# Patient Record
Sex: MALE | Race: WHITE | URBAN - METROPOLITAN AREA
[De-identification: names, ages, dates, MRNs, and addresses within clinical notes are randomized per-mention and may not be internally consistent; named-entity substitution may affect disease eponyms.]

---

## 2017-03-01 ENCOUNTER — TRANSFERRED RECORDS (OUTPATIENT)
Dept: HEALTH INFORMATION MANAGEMENT | Facility: CLINIC | Age: 82
End: 2017-03-01

## 2017-03-06 ENCOUNTER — TRANSFERRED RECORDS (OUTPATIENT)
Dept: HEALTH INFORMATION MANAGEMENT | Facility: CLINIC | Age: 82
End: 2017-03-06

## 2017-03-07 ENCOUNTER — HOSPITAL ENCOUNTER (INPATIENT)
Facility: CLINIC | Age: 82
LOS: 12 days | Discharge: NURSING, REHAB OR LONG-TERM CARE | DRG: 813 | End: 2017-03-19
Attending: INTERNAL MEDICINE | Admitting: HOSPITALIST
Payer: COMMERCIAL

## 2017-03-07 ENCOUNTER — TRANSFERRED RECORDS (OUTPATIENT)
Dept: HEALTH INFORMATION MANAGEMENT | Facility: CLINIC | Age: 82
End: 2017-03-07

## 2017-03-07 ENCOUNTER — APPOINTMENT (OUTPATIENT)
Dept: CT IMAGING | Facility: CLINIC | Age: 82
DRG: 813 | End: 2017-03-07
Attending: PHYSICIAN ASSISTANT
Payer: COMMERCIAL

## 2017-03-07 DIAGNOSIS — I48.91 ATRIAL FIBRILLATION, UNSPECIFIED TYPE (H): ICD-10-CM

## 2017-03-07 DIAGNOSIS — R05.9 COUGH: ICD-10-CM

## 2017-03-07 DIAGNOSIS — R58 INTRAABDOMINAL HEMORRHAGE: Primary | ICD-10-CM

## 2017-03-07 DIAGNOSIS — F32.A DEPRESSION, UNSPECIFIED DEPRESSION TYPE: ICD-10-CM

## 2017-03-07 DIAGNOSIS — K21.9 GASTROESOPHAGEAL REFLUX DISEASE WITHOUT ESOPHAGITIS: ICD-10-CM

## 2017-03-07 LAB
ABO + RH BLD: NORMAL
ABO + RH BLD: NORMAL
ANION GAP SERPL CALCULATED.3IONS-SCNC: 9 MMOL/L (ref 3–14)
BLD GP AB SCN SERPL QL: NORMAL
BLD PROD TYP BPU: NORMAL
BLD PROD TYP BPU: NORMAL
BLD UNIT ID BPU: 0
BLOOD BANK CMNT PATIENT-IMP: NORMAL
BLOOD PRODUCT CODE: NORMAL
BPU ID: NORMAL
BUN SERPL-MCNC: 27 MG/DL (ref 7–30)
CALCIUM SERPL-MCNC: 8.2 MG/DL (ref 8.5–10.1)
CHLORIDE SERPL-SCNC: 102 MMOL/L (ref 94–109)
CO2 SERPL-SCNC: 26 MMOL/L (ref 20–32)
CREAT SERPL-MCNC: 1.2 MG/DL (ref 0.66–1.25)
ERYTHROCYTE [DISTWIDTH] IN BLOOD BY AUTOMATED COUNT: 14.8 % (ref 10–15)
GFR SERPL CREATININE-BSD FRML MDRD: 57 ML/MIN/1.7M2
GLUCOSE BLDC GLUCOMTR-MCNC: 108 MG/DL (ref 70–99)
GLUCOSE BLDC GLUCOMTR-MCNC: 117 MG/DL (ref 70–99)
GLUCOSE BLDC GLUCOMTR-MCNC: 118 MG/DL (ref 70–99)
GLUCOSE SERPL-MCNC: 99 MG/DL (ref 70–99)
HCT VFR BLD AUTO: 38.7 % (ref 40–53)
HGB BLD-MCNC: 12 G/DL (ref 13.3–17.7)
HGB BLD-MCNC: 12.5 G/DL (ref 13.3–17.7)
HGB BLD-MCNC: 12.7 G/DL (ref 13.3–17.7)
HGB BLD-MCNC: 13.4 G/DL (ref 13.3–17.7)
INR PPP: 1.22 (ref 0.86–1.14)
INR PPP: 1.26 (ref 0.86–1.14)
INR PPP: 2.48 (ref 0.86–1.14)
LACTATE BLD-SCNC: 2.6 MMOL/L (ref 0.7–2.1)
LACTATE BLD-SCNC: 3.4 MMOL/L (ref 0.7–2.1)
MCH RBC QN AUTO: 34.7 PG (ref 26.5–33)
MCHC RBC AUTO-ENTMCNC: 34.6 G/DL (ref 31.5–36.5)
MCV RBC AUTO: 100 FL (ref 78–100)
NUM BPU REQUESTED: 1
PLATELET # BLD AUTO: 146 10E9/L (ref 150–450)
POTASSIUM SERPL-SCNC: 3.5 MMOL/L (ref 3.4–5.3)
RBC # BLD AUTO: 3.86 10E12/L (ref 4.4–5.9)
SODIUM SERPL-SCNC: 137 MMOL/L (ref 133–144)
SPECIMEN EXP DATE BLD: NORMAL
TRANSFUSION STATUS PATIENT QL: NORMAL
TRANSFUSION STATUS PATIENT QL: NORMAL
WBC # BLD AUTO: 5.5 10E9/L (ref 4–11)

## 2017-03-07 PROCEDURE — 25000128 H RX IP 250 OP 636: Performed by: PHYSICIAN ASSISTANT

## 2017-03-07 PROCEDURE — 74176 CT ABD & PELVIS W/O CONTRAST: CPT

## 2017-03-07 PROCEDURE — 25000125 ZZHC RX 250: Performed by: PHYSICIAN ASSISTANT

## 2017-03-07 PROCEDURE — 85018 HEMOGLOBIN: CPT | Performed by: HOSPITALIST

## 2017-03-07 PROCEDURE — 80048 BASIC METABOLIC PNL TOTAL CA: CPT | Performed by: PHYSICIAN ASSISTANT

## 2017-03-07 PROCEDURE — 25000125 ZZHC RX 250

## 2017-03-07 PROCEDURE — 99223 1ST HOSP IP/OBS HIGH 75: CPT | Mod: AI | Performed by: HOSPITALIST

## 2017-03-07 PROCEDURE — 85610 PROTHROMBIN TIME: CPT | Performed by: HOSPITALIST

## 2017-03-07 PROCEDURE — 36415 COLL VENOUS BLD VENIPUNCTURE: CPT | Performed by: HOSPITALIST

## 2017-03-07 PROCEDURE — 85027 COMPLETE CBC AUTOMATED: CPT | Performed by: PHYSICIAN ASSISTANT

## 2017-03-07 PROCEDURE — 36415 COLL VENOUS BLD VENIPUNCTURE: CPT | Performed by: PHYSICIAN ASSISTANT

## 2017-03-07 PROCEDURE — 25000555 ZZHC RX FACTOR IP 250 OP 636: Performed by: PHYSICIAN ASSISTANT

## 2017-03-07 PROCEDURE — 85610 PROTHROMBIN TIME: CPT | Performed by: PHYSICIAN ASSISTANT

## 2017-03-07 PROCEDURE — 86901 BLOOD TYPING SEROLOGIC RH(D): CPT | Performed by: PHYSICIAN ASSISTANT

## 2017-03-07 PROCEDURE — 83605 ASSAY OF LACTIC ACID: CPT | Performed by: PHYSICIAN ASSISTANT

## 2017-03-07 PROCEDURE — 85018 HEMOGLOBIN: CPT | Performed by: PHYSICIAN ASSISTANT

## 2017-03-07 PROCEDURE — 86900 BLOOD TYPING SEROLOGIC ABO: CPT | Performed by: PHYSICIAN ASSISTANT

## 2017-03-07 PROCEDURE — 00000146 ZZHCL STATISTIC GLUCOSE BY METER IP

## 2017-03-07 PROCEDURE — C9132 KCENTRA, PER I.U.: HCPCS | Performed by: PHYSICIAN ASSISTANT

## 2017-03-07 PROCEDURE — P9059 PLASMA, FRZ BETWEEN 8-24HOUR: HCPCS | Performed by: PHYSICIAN ASSISTANT

## 2017-03-07 PROCEDURE — 99207 ZZC APP CREDIT; MD BILLING SHARED VISIT: CPT | Performed by: PHYSICIAN ASSISTANT

## 2017-03-07 PROCEDURE — 20000003 ZZH R&B ICU

## 2017-03-07 PROCEDURE — 40000344 ZZHCL STATISTIC THAWING COMPONENT: Performed by: PHYSICIAN ASSISTANT

## 2017-03-07 PROCEDURE — 86850 RBC ANTIBODY SCREEN: CPT | Performed by: PHYSICIAN ASSISTANT

## 2017-03-07 PROCEDURE — 99221 1ST HOSP IP/OBS SF/LOW 40: CPT | Performed by: SURGERY

## 2017-03-07 PROCEDURE — 83605 ASSAY OF LACTIC ACID: CPT | Performed by: HOSPITALIST

## 2017-03-07 RX ORDER — ONDANSETRON 2 MG/ML
4 INJECTION INTRAMUSCULAR; INTRAVENOUS EVERY 6 HOURS PRN
Status: DISCONTINUED | OUTPATIENT
Start: 2017-03-07 | End: 2017-03-19 | Stop reason: HOSPADM

## 2017-03-07 RX ORDER — MINERAL OIL/HYDROPHIL PETROLAT
OINTMENT (GRAM) TOPICAL 2 TIMES DAILY PRN
COMMUNITY

## 2017-03-07 RX ORDER — HYDROMORPHONE HYDROCHLORIDE 1 MG/ML
.3-.5 INJECTION, SOLUTION INTRAMUSCULAR; INTRAVENOUS; SUBCUTANEOUS
Status: DISCONTINUED | OUTPATIENT
Start: 2017-03-07 | End: 2017-03-07

## 2017-03-07 RX ORDER — SENNOSIDES 8.6 MG
1 TABLET ORAL AT BEDTIME
COMMUNITY

## 2017-03-07 RX ORDER — DEXTROSE MONOHYDRATE 25 G/50ML
25-50 INJECTION, SOLUTION INTRAVENOUS
Status: DISCONTINUED | OUTPATIENT
Start: 2017-03-07 | End: 2017-03-19 | Stop reason: HOSPADM

## 2017-03-07 RX ORDER — LEVOTHYROXINE SODIUM 100 UG/1
200 TABLET ORAL DAILY
Status: DISCONTINUED | OUTPATIENT
Start: 2017-03-08 | End: 2017-03-19 | Stop reason: HOSPADM

## 2017-03-07 RX ORDER — PHYTONADIONE 1 MG/.5ML
2 INJECTION, EMULSION INTRAMUSCULAR; INTRAVENOUS; SUBCUTANEOUS ONCE
Status: DISCONTINUED | OUTPATIENT
Start: 2017-03-07 | End: 2017-03-07

## 2017-03-07 RX ORDER — PROCHLORPERAZINE 25 MG
25 SUPPOSITORY, RECTAL RECTAL EVERY 12 HOURS PRN
COMMUNITY

## 2017-03-07 RX ORDER — NITROGLYCERIN 0.4 MG/1
0.4 TABLET SUBLINGUAL EVERY 5 MIN PRN
Status: DISCONTINUED | OUTPATIENT
Start: 2017-03-07 | End: 2017-03-19 | Stop reason: HOSPADM

## 2017-03-07 RX ORDER — PROCHLORPERAZINE 25 MG
12.5 SUPPOSITORY, RECTAL RECTAL EVERY 12 HOURS PRN
Status: DISCONTINUED | OUTPATIENT
Start: 2017-03-07 | End: 2017-03-19 | Stop reason: HOSPADM

## 2017-03-07 RX ORDER — OSELTAMIVIR PHOSPHATE 30 MG/1
30 CAPSULE ORAL DAILY
Status: DISCONTINUED | OUTPATIENT
Start: 2017-03-07 | End: 2017-03-08

## 2017-03-07 RX ORDER — ACETAMINOPHEN 650 MG/1
650 SUPPOSITORY RECTAL EVERY 4 HOURS PRN
Status: DISCONTINUED | OUTPATIENT
Start: 2017-03-07 | End: 2017-03-19 | Stop reason: HOSPADM

## 2017-03-07 RX ORDER — HYDROMORPHONE HYDROCHLORIDE 1 MG/ML
0.3 INJECTION, SOLUTION INTRAMUSCULAR; INTRAVENOUS; SUBCUTANEOUS
Status: DISCONTINUED | OUTPATIENT
Start: 2017-03-07 | End: 2017-03-19 | Stop reason: HOSPADM

## 2017-03-07 RX ORDER — NALOXONE HYDROCHLORIDE 0.4 MG/ML
.1-.4 INJECTION, SOLUTION INTRAMUSCULAR; INTRAVENOUS; SUBCUTANEOUS
Status: DISCONTINUED | OUTPATIENT
Start: 2017-03-07 | End: 2017-03-19 | Stop reason: HOSPADM

## 2017-03-07 RX ORDER — LIDOCAINE 40 MG/G
CREAM TOPICAL
Status: DISCONTINUED | OUTPATIENT
Start: 2017-03-07 | End: 2017-03-19 | Stop reason: HOSPADM

## 2017-03-07 RX ORDER — ACETAMINOPHEN 325 MG/1
650 TABLET ORAL EVERY 4 HOURS PRN
Status: DISCONTINUED | OUTPATIENT
Start: 2017-03-07 | End: 2017-03-19 | Stop reason: HOSPADM

## 2017-03-07 RX ORDER — PROCHLORPERAZINE MALEATE 5 MG
5 TABLET ORAL EVERY 6 HOURS PRN
Status: DISCONTINUED | OUTPATIENT
Start: 2017-03-07 | End: 2017-03-19 | Stop reason: HOSPADM

## 2017-03-07 RX ORDER — SENNOSIDES 8.6 MG
1 TABLET ORAL AT BEDTIME
Status: DISCONTINUED | OUTPATIENT
Start: 2017-03-07 | End: 2017-03-19 | Stop reason: HOSPADM

## 2017-03-07 RX ORDER — ONDANSETRON 4 MG/1
4 TABLET, ORALLY DISINTEGRATING ORAL EVERY 6 HOURS PRN
Status: DISCONTINUED | OUTPATIENT
Start: 2017-03-07 | End: 2017-03-19 | Stop reason: HOSPADM

## 2017-03-07 RX ORDER — POLYETHYLENE GLYCOL 3350 17 G/17G
1 POWDER, FOR SOLUTION ORAL DAILY PRN
COMMUNITY

## 2017-03-07 RX ORDER — NICOTINE POLACRILEX 4 MG
15-30 LOZENGE BUCCAL
Status: DISCONTINUED | OUTPATIENT
Start: 2017-03-07 | End: 2017-03-19 | Stop reason: HOSPADM

## 2017-03-07 RX ORDER — ACETAMINOPHEN 650 MG/1
SUPPOSITORY RECTAL
Status: DISCONTINUED
Start: 2017-03-07 | End: 2017-03-07 | Stop reason: WASHOUT

## 2017-03-07 RX ORDER — SODIUM CHLORIDE 9 MG/ML
INJECTION, SOLUTION INTRAVENOUS CONTINUOUS
Status: DISCONTINUED | OUTPATIENT
Start: 2017-03-07 | End: 2017-03-08

## 2017-03-07 RX ADMIN — HYDROMORPHONE HYDROCHLORIDE 0.3 MG: 1 INJECTION, SOLUTION INTRAMUSCULAR; INTRAVENOUS; SUBCUTANEOUS at 14:21

## 2017-03-07 RX ADMIN — LIDOCAINE HYDROCHLORIDE 10 ML: 20 JELLY TOPICAL at 14:14

## 2017-03-07 RX ADMIN — METOPROLOL TARTRATE 2.5 MG: 5 INJECTION INTRAVENOUS at 19:32

## 2017-03-07 RX ADMIN — PROTHROMBIN, COAGULATION FACTOR VII HUMAN, COAGULATION FACTOR IX HUMAN, COAGULATION FACTOR X HUMAN, PROTEIN C, PROTEIN S HUMAN, AND WATER 2794 UNITS: KIT at 14:36

## 2017-03-07 RX ADMIN — PHYTONADIONE 10 MG: 10 INJECTION, EMULSION INTRAMUSCULAR; INTRAVENOUS; SUBCUTANEOUS at 13:43

## 2017-03-07 RX ADMIN — METOPROLOL TARTRATE 2.5 MG: 5 INJECTION INTRAVENOUS at 16:04

## 2017-03-07 RX ADMIN — SODIUM CHLORIDE 1000 ML: 9 INJECTION, SOLUTION INTRAVENOUS at 14:24

## 2017-03-07 RX ADMIN — SODIUM CHLORIDE, PRESERVATIVE FREE: 5 INJECTION INTRAVENOUS at 16:41

## 2017-03-07 RX ADMIN — SODIUM CHLORIDE 500 ML: 9 INJECTION, SOLUTION INTRAVENOUS at 13:32

## 2017-03-07 RX ADMIN — SODIUM CHLORIDE, PRESERVATIVE FREE: 5 INJECTION INTRAVENOUS at 10:07

## 2017-03-07 ASSESSMENT — PAIN DESCRIPTION - DESCRIPTORS: DESCRIPTORS: SHARP

## 2017-03-07 NOTE — H&P
PRIMARY CARE PROVIDER:  [facility]       History obtained  from the patient and extensive review of medical records from outside hospital.      HISTORY OF PRESENT ILLNESS:  Mason Gonzalez is a 89-year-old male with past medical history of hypothyroidism and diet-controlled type 2 diabetes, obstructive sleep apnea and CHF as well as atrial fibrillation on chronic anticoagulation with warfarin, who presented initially to the [facility] for evaluation of abdominal pain.  He reports 4-5 days of progressive abdominal pain with diminished oral intake.  He was evaluated at outside medical center.  Laboratory evaluation was relatively unremarkable with the exception of the INR 5.8.  A CT of abdomen and pelvis was obtained which showed an 8.4 cm extraperitoneal hematoma around the bladder.  He was given IV fluids and his hemoglobin was stable at 13.6.  His INR was reversed first with vitamin K.  There were no beds at the [facility] so transfer to [facility] was requested.      Presently the patient is evaluated in his hospital room.  He did provide a limited history.  He continues to complain of abdominal pain.  No current nausea or vomiting but does note diminished oral intake recently.  Denies fevers or chills.  Last bowel movement was 5 days ago.  Denies any difficulty urinating.  Denies recent trauma or fall.  No recent instrumentation of the bladder.      PAST MEDICAL HISTORY:   1.  Hypothyroidism.   2.  Diet-controlled type 2 diabetes.   3.  BPH.   4.  Dyslipidemia.   5.  Obstructive sleep apnea, does not use CPAP.   6.  Depression.   7.  GERD.   8.  History of colon cancer, status post right colectomy.   9.  Hypertension.   10.  Spinal stenosis.   11.  Atrial fibrillation on chronic anticoagulation.   12.  History of iliac artery aneurysm.   13.  History of congestive heart failure, unspecified.        PRIOR TO ADMISSION MEDICATIONS:   Prescriptions Prior to  Admission   Medication Sig Dispense Refill Last Dose     Potassium Chloride Mayra CR (K-DUR PO) Take 20 mEq by mouth daily        polyethylene glycol (MIRALAX/GLYCOLAX) Packet Take 1 packet by mouth daily as needed for constipation Give every 4 days if no stool        METOPROLOL TARTRATE PO Take 50 mg by mouth 2 times daily        MECLIZINE HCL PO Take 25 mg by mouth every 6 hours as needed for dizziness        MAGNESIUM OXIDE PO Take 400 mg by mouth At Bedtime        Levothyroxine Sodium (SYNTHROID PO) Take 200 mcg by mouth daily        Furosemide (LASIX PO) Take 40 mg by mouth daily        VITAMIN D, CHOLECALCIFEROL, PO Take 4,000 Units by mouth daily        AMOXICILLIN PO Take 2,000 mg by mouth once One hour before dental cleaning        Acetaminophen (TYLENOL PO) Take 650 mg by mouth 4 times daily        prochlorperazine (COMPAZINE) 25 MG Suppository Place 25 mg rectally every 12 hours as needed for nausea        TraMADol HCl (ULTRAM PO) Take 25 mg by mouth every 6 hours as needed for moderate to severe pain        mineral oil-hydrophilic petrolatum (AQUAPHOR) Apply topically 2 times daily as needed for dry skin        Hypromellose (HYDROXYPROPYL METHYLCELLULOSE OP) Place into both eyes 3 times daily        FUROSEMIDE PO Take 20 mg by mouth daily (with lunch) At 1400        WARFARIN SODIUM PO Take by mouth See Admin Instructions 6 mg Tuesday, Thursday. 5 mg the rest of the week.   3/6/2017 at Unknown time     sennosides (SENOKOT) 8.6 MG tablet Take 1 tablet by mouth At Bedtime        Oseltamivir Phosphate (TAMIFLU PO) Take 30 mg by mouth daily For ten days, starting 3/6/17             PAST SURGICAL HISTORY:    1.  Bilateral shoulder arthroplasty.   2.  Cholecystectomy.     3.  Right hemicolectomy.   4.  Hip repair.      FAMILY HISTORY:  Reviewed and noncontributory.      SOCIAL HISTORY:  He currently resides in a long-term care facility.  He denies current tobacco or alcohol use.      REVIEW OF SYSTEMS:  A  10-point review of systems was completed.  Pertinent positives are noted in HPI, other systems negative.      PHYSICAL EXAMINATION:   GENERAL:  Patient is a well-developed, well-nourished 89-year-old frail feeling male who appears his stated age and is lying in bed in no acute distress.   VITAL SIGNS:  Blood pressure is 100/68, pulse 97, temperature 98.6, O2 sat 98% on room air.   HEENT:  Normocephalic, atraumatic.  Eyes:  Pupils equal, round, reactive to light, lids and sclerae are clear, extraocular movements intact.  Oropharynx is midline.  Pharynx is clear.  Mucous membranes are dry.   NECK:  Supple, no adenopathy, no thyromegaly.   CARDIOVASCULAR:  Irregularly irregular, rate controlled.  No murmurs appreciated.   PULMONARY:  Normal effort, crackles in bilateral bases.   ABDOMEN:  Slightly distended, diminished bowel sounds.  Abdomen is firm to palpation with right lower quadrant tenderness with mild guarding.   EXTREMITIES:  Moves all 4 extremities.  Dorsalis pedis and radial pulses are palpated bilaterally, lower extremities without edema.   NEUROLOGIC:  He is alert and oriented.  Cranial nerves II-XII are grossly intact.      LABORATORY DATA:  Reviewed in Epic.      ASSESSMENT:  Patient is a 89-year-old male with past medical history of atrial fibrillation on chronic anticoagulation, hypothyroidism and diet-controlled type 2 diabetes who presented from outside hospital for evaluation of an extraperitoneal bladder hemorrhage.   1.  Acute abdominal pain, likely secondary to extraperitoneal bladder hemorrhage.  The patient had a CT scan at outlying facility which showed an 8.4 cm extraperitoneal hemorrhage in the right pelvis surrounding the bladder.  His hemoglobin was stable at 13.6; however, INR was elevated at 5.8.  His INR was reversed with vitamin K and he was transferred to [facility].  He currently remains hemodynamically stable.  Repeat laboratory evaluation revealed stable hemoglobin  as well improved INR of 2.48.  He has been typed and screened.  I have discussed with Radiology and will plan to repeat a noncontrast abdominal CT to reassess bleeding.  If progressing, will discuss with Interventional Radiology to see if coiling would be appropriate.  If appears stable, will consult General Surgery to assist and follow along and medically manage.  For now he will be n.p.o. pending workup.  Pain control will be provided with IV Dilaudid.  Will follow serial hemoglobins.   2.  Atrial fibrillation on chronic anticoagulation.  His INR was reversed with vitamin K at outlying facility.  I could not see in records how much vitamin K he was given; however, his INR has trended down from 5.8 to 2.48.  He did not receive FFP.  Warfarin is on hold.  We will also hold prior to admission metoprolol for now given soft BPs.  Monitor on continuous telemetry.  Resume as indicated.   3.  Hypothyroidism.  Continue prior to admission Synthroid.   4.  Congestive heart failure, unspecified.  The patient has [facility]  I have no echocardiograms available.  Currently appears euvolemic.  Will hold his prior to admission Lasix.  He will be hydrated with IV fluids.  Monitor I's and O's and daily weights.   5.  Obstructive sleep apnea, does not use CPAP.   6.  Influenza prophylaxis.  The patient was initiated on Tamiflu prophylaxis at [facility] for 10 days.  We will continue.   7.  Deep venous thrombosis prophylaxis:  PCDs.      CODE STATUS:  The patient has a POLST in his chart which states he is full code.  Discussed with the patient and he confirms full code.      The patient was discussed with Dr. Voss of the Hospitalist Service who independently interviewed the patient.  He is in agreement with the above plan.         MACKENZIE VOSS MD       As dictated by TERESA LAW PA-C            D: 03/07/2017 11:03   T: 03/07/2017 11:26   MT: [id number]       Name:     HYUN COTA   MRN:      [id number]        Account:      [id number]   :      1926           Admitted:     [id number]      Document: [id number]       cc: [facility]

## 2017-03-07 NOTE — CONSULTS
"Surgery Consultation      Mason Gonzalez MRN# [id number]   YOB: 1926 Age: 89 year old   Date of Admission: 3/7/2017     Reason for consult: I was asked by Dr. Voss to evaluate this patient for extraperitoneal spontaneous hematoma.           Assessment and Plan:   Active Problems:    Extraperitoneal hemorrhage    Assessment: clinically stable, Hgb stable at this point, having INR reverse    Plan: observation, based on clinical response, might require exploration for evacuation or control of bleeding, but not likely at this time.              Chief Complaint:   Lower abdominal pain and extraperitoneal hematoma anterior and right of the urinary bladder.    History is obtained from the patient and electronic health record         History of Present Illness:   This patient is a 89 year old male who presents with lower abdominal pain pain, supratherapeutic INR and spontaneous extraperitoneal bleed.              Past Medical History:   No past medical history on file.          Past Surgical History:   No past surgical history on file.            Social History:   Reviewed and non-contributory          Family History:   No family history on file.          Immunizations:   There is no immunization history for the selected administration types on file for this patient.          Allergies:     Allergies   Allergen Reactions     Codeine Other (See Comments)     Since 2007. Gets massive migraine headaches \"like his head is going to explode\"     Lisinopril      Since 2008             Medications:     Prescriptions Prior to Admission   Medication Sig Dispense Refill Last Dose     Potassium Chloride Mayra CR (K-DUR PO) Take 20 mEq by mouth daily        polyethylene glycol (MIRALAX/GLYCOLAX) Packet Take 1 packet by mouth daily as needed for constipation Give every 4 days if no stool        METOPROLOL TARTRATE PO Take 50 mg by mouth 2 times daily        MECLIZINE HCL PO Take 25 mg by mouth every 6 hours as needed " for dizziness        MAGNESIUM OXIDE PO Take 400 mg by mouth At Bedtime        Levothyroxine Sodium (SYNTHROID PO) Take 200 mcg by mouth daily        Furosemide (LASIX PO) Take 40 mg by mouth daily        VITAMIN D, CHOLECALCIFEROL, PO Take 4,000 Units by mouth daily        AMOXICILLIN PO Take 2,000 mg by mouth once One hour before dental cleaning        Acetaminophen (TYLENOL PO) Take 650 mg by mouth 4 times daily        prochlorperazine (COMPAZINE) 25 MG Suppository Place 25 mg rectally every 12 hours as needed for nausea        TraMADol HCl (ULTRAM PO) Take 25 mg by mouth every 6 hours as needed for moderate to severe pain        mineral oil-hydrophilic petrolatum (AQUAPHOR) Apply topically 2 times daily as needed for dry skin        Hypromellose (HYDROXYPROPYL METHYLCELLULOSE OP) Place into both eyes 3 times daily        FUROSEMIDE PO Take 20 mg by mouth daily (with lunch) At 1400        WARFARIN SODIUM PO Take by mouth See Admin Instructions 6 mg Tuesday, Thursday. 5 mg the rest of the week.   3/6/2017 at Unknown time     sennosides (SENOKOT) 8.6 MG tablet Take 1 tablet by mouth At Bedtime        Oseltamivir Phosphate (TAMIFLU PO) Take 30 mg by mouth daily For ten days, starting 3/6/17                Review of Systems:   The 5 point Review of Systems is negative other than noted in the HPI            Physical Exam:   Vitals were reviewed  Constitutional:   awake, alert, cooperative, no apparent distress, and appears stated age     Eyes:   sclera clear     ENT:   atramatic     Lungs:   no increased work of breathing, good air exchange and no retractions. O2 mask on.     Abdomen:   no scars, firm and tenderness noted in the right lower quadrant     Skin:   no lesions          Data:   All laboratory data reviewed  All imaging studies reviewed by me.

## 2017-03-07 NOTE — PLAN OF CARE
"Problem: Goal Outcome Summary  Goal: Goal Outcome Summary  Outcome: Declining  7a-3p shift:  [facility] - report given to writer by ED nurse Jarad ([phone]) who stated pt presented there with low abdominal pain. High INR (5.6) and CT scan done there at approx 0230 showing retro peritoneal hemorrhaging - Both Dilaudid and Vit K given there x1.   Has HX Afib - was on Coumadin. Writer anticipates that as probable cause of his internal bleeding  Admitted into  831-1 by a resource nurse. Pt alert with dry sense of humor, A&O, [place], facial color dusky, extremities bruised. Abdomen firm distended and very tender to touch.Tele started showing A-fib with UVR HR ranging from 120-140's  CT of abd/pelvis was repeated here which showed increased bleeding. BP  started dropping as low as 81/50 with  - Writer paged Hospitalist and his PA Dinah Duong who came to bedside. Orders received. Was found to have an old periph IV site still [facility]. Flying squad/resources werecalled to assist with cares. Two new IV sites were started, Coude wu catheter was placed. Since pt was mouth breathing with his tachycardia wiriter increased qxygen from 2LPM on NC to 4LPM on Oxymizer   Vit K given x1, Prothrombin 4 complex/KCENTRA given x1, A bolus of 1000cc started at 500cc/hr.  Daughter Luann called for update and told writer that pt is very sensitive to narcotics which have caused hallucinations, confusion and agitation in the past . Writer informed MD\"s of this verbally and sticky note was placed in professional exchange for all MD's to see.  Patient resides at [facility] ([phone])  located in [place], [place]. Writer called the facility asking why pt was on Tamilflu. Their nurse reported that they have a flu outbreak there and all pt's even those without any symptoms or dx of flu were placed on Tamiflu for preacautionary " measures.  Plan: Writer gave report to ICU nurse Arpit MADDEN And updated pt's son who was at bedside. Pt will be transferred to ICU bed rm 365 at where FFP will be initiated and any remaining meds not yet given.

## 2017-03-07 NOTE — PROGRESS NOTES
[facility]    Sepsis Evaluation Progress Note    Date of Service: 03/07/2017    I was called to see Mason Gonzalez due to abnormal vital signs triggering the Sepsis SIRS screening alert. He is not known to have an infection.     Physical Exam    Vital Signs:  Temp: 96.8  F (36  C) Temp src: Oral BP: (!) 81/50 Pulse: 131   Resp: 16 SpO2: 95 % O2 Device: Nasal cannula Oxygen Delivery: 2 LPM    Lab:  Lactic Acid   Date Value Ref Range Status   03/07/2017 3.4 (H) 0.7 - 2.1 mmol/L Final       The patient is at baseline mental status.    The rest of their physical exam is significant for : abdominal wall tenderness in RLQ    Assessment and Plan    The SIRS and exam findings are likely due to a fib with RVR, abdominal wall hematoma, there is no sign of sepsis at this time.    Disposition: The patient will be transferred to the ICU. Attending Physician, intensivist, was notified.    Esteban Voss MD

## 2017-03-07 NOTE — PROGRESS NOTES
Repeat CT scan read reviewed. Noted for expanding extraperitoneal hemorrhage. Discussed with Dr. Vegas of IR. He recommends aggressive INR reversal with additional Vit K and FFP.  Close monitoring of Hgb. Typically these bleeds will tamponade off on their own. General surgery consult for additional management and if debulking of thrombosis is indicate. If bleeding persists despite INR reversal or patient becomes hemodynamically unstable can consider urgent angiogram per IR.    - Vit K 10mg IV  - KCentra  - 1 unit FFP  - Serial Hgb q 4  - General surgery consult  - [place] [facility]    Kemi Duong PA-C  Hospitalist Service  [phone]

## 2017-03-07 NOTE — IP AVS SNAPSHOT
[facility] [address] [facility]    [address] [place] [address]    Phone:  [phone]                                       After Visit Summary   3/7/2017    Mason Gonzalez    MRN: [id number]           After Visit Summary Signature Page     I have received my discharge instructions, and my questions have been answered. I have discussed any challenges I see with this plan with the nurse or doctor.    ..........................................................................................................................................  Patient/Patient Representative Signature      ..........................................................................................................................................  Patient Representative Print Name and Relationship to Patient    ..................................................               ................................................  Date                                            Time    ..........................................................................................................................................  Reviewed by Signature/Title    ...................................................              ..............................................  Date                                                            Time

## 2017-03-07 NOTE — PROVIDER NOTIFICATION
Prescriber Notification Note    The pharmacist has communicated with this patient's provider regarding a concern or therapy recommendation.    Notified Person: REYNA Duong  Date/Time of Notification: 3/7/17 4836  Interaction: text page  Concern/Recommendation: vitamin K IM ordered. Not allowed due to hematoma risk.     Comments/Additional Details:vitamin K change to 5 mg IV now.

## 2017-03-07 NOTE — IP AVS SNAPSHOT
[place] [address] [facility]: [phone]                                              INTERAGENCY TRANSFER FORM - LAB / IMAGING / EKG / EMG RESULTS   3/7/2017                    Hospital Admission Date: 3/7/2017  HYUN COTA   : 1926  Sex: Male        Attending Provider: Esteban Voss MD     Allergies:  Codeine, Lisinopril    Infection:  None   Service:  HOSPITALIST    Ht:  1.829 m (6')   Wt:  90.1 kg (198 lb 10.2 oz)   Admission Wt:  87.2 kg (192 lb 3.9 oz)    BMI:  26.94 kg/m 2   BSA:  2.14 m 2            Patient PCP Information     None on File         Lab Results - 3 Days      Basic metabolic panel [[id number]] (Abnormal)  Resulted: 17, Result status: Final result    Ordering provider: Ce Patrick MD  17 Resulting lab: [facility]    Specimen Information    Type Source Collected On   Blood  17          Components       Value Reference Range Flag Lab   Sodium 136 133 - 144 mmol/L  FrStHsLb   Potassium 3.8 3.4 - 5.3 mmol/L  FrStHsLb   Chloride 99 94 - 109 mmol/L  FrStHsLb   Carbon Dioxide 28 20 - 32 mmol/L  FrStHsLb   Anion Gap 9 3 - 14 mmol/L  FrStHsLb   Glucose 233 70 - 99 mg/dL H FrStHsLb   Urea Nitrogen 38 7 - 30 mg/dL H FrStHsLb   Creatinine 1.31 0.66 - 1.25 mg/dL H FrStHsLb   GFR Estimate 51 >60 mL/min/1.7m2 L FrStHsLb   Comment:  Non  GFR Calc   GFR Estimate If Black 62 >60 mL/min/1.7m2  FrStHsLb   Comment:  African American GFR Calc   Calcium 8.4 8.5 - 10.1 mg/dL L FrStHsLb            Magnesium [[id number]]  Resulted: 17, Result status: Final result    Ordering provider: Ce Patrick MD  17 Resulting lab: [facility]    Specimen Information    Type Source Collected On     17          Components       Value Reference Range Flag Lab   Magnesium 2.3 1.6 - 2.3 mg/dL  FrStHsLb            INR [[id number]] (Abnormal)  Resulted: 17 0936, Result  status: Final result    Ordering provider: Ce Patrick MD  03/19/17 0000 Resulting lab: [facility]    Specimen Information    Type Source Collected On   Blood  03/19/17 0905          Components       Value Reference Range Flag Lab   INR 1.92 0.86 - 1.14 H FrStHsLb            Glucose by meter [[id number]] (Abnormal)  Resulted: 03/19/17 0821, Result status: Final result    Ordering provider: Esteban Voss MD  03/19/17 0813 Resulting lab: POINT OF CARE TEST, GLUCOSE    Specimen Information    Type Source Collected On     03/19/17 0813          Components       Value Reference Range Flag Lab   Glucose 216 70 - 99 mg/dL H 170            Digoxin level [[id number]]  Resulted: 03/18/17 0812, Result status: Final result    Ordering provider: Ce Patrick MD  03/18/17 0721 Resulting lab: [facility]    Specimen Information    Type Source Collected On   Blood  03/18/17 0615          Components       Value Reference Range Flag Lab   Digoxin Level 0.7 0.5 - 2.0 ug/L  FrStHsLb            Basic metabolic panel [[id number]] (Abnormal)  Resulted: 03/18/17 0754, Result status: Final result    Ordering provider: Ce Patrick MD  03/18/17 0721 Resulting lab: [facility]    Specimen Information    Type Source Collected On   Blood  03/18/17 0615          Components       Value Reference Range Flag Lab   Sodium 135 133 - 144 mmol/L  FrStHsLb   Potassium 4.4 3.4 - 5.3 mmol/L  FrStHsLb   Chloride 99 94 - 109 mmol/L  FrStHsLb   Carbon Dioxide 28 20 - 32 mmol/L  FrStHsLb   Anion Gap 8 3 - 14 mmol/L  FrStHsLb   Glucose 211 70 - 99 mg/dL H FrStHsLb   Urea Nitrogen 35 7 - 30 mg/dL H FrStHsLb   Creatinine 1.21 0.66 - 1.25 mg/dL  FrStHsLb   GFR Estimate 56 >60 mL/min/1.7m2 L FrStHsLb   Comment:  Non  GFR Calc   GFR Estimate If Black 68 >60 mL/min/1.7m2  FrStHsLb   Comment:  African American GFR Calc   Calcium 8.5 8.5 - 10.1 mg/dL  FrStHsLb            INR [[id number]]  (Abnormal)  Resulted: 03/18/17 0753, Result status: Final result    Ordering provider: Ce Patrick MD  03/18/17 0721 Resulting lab: [facility]    Specimen Information    Type Source Collected On   Blood  03/18/17 0615          Components       Value Reference Range Flag Lab   [facility] 0.86 [facility]            Blood Morphology Pathologist Review [[id number]]  Resulted: 03/17/17 1343, Result status: Final result    Ordering provider: Ce Patrick MD  03/17/17 1117 Resulting lab: [facility]    Specimen Information    Type Source Collected On   Blood  03/17/17 0443          Components       Value Reference Range Flag Lab   Copath Report --      Result:         Patient Name: HYUN COTA  MR#: [id number]  Specimen #: [id number]  Collected: 3/17/2017  Received: 3/17/2017  Reported: 3/17/2017 13:41  Ordering Phy(s): KATIE ROGERS  Additional Phy(s): EC PATRICK    For improved result formatting, select 'View Enhanced Report Format'  under Linked Documents section.    TEST(S):  Peripheral Smear Morphology    FINAL DIAGNOSIS:  Peripheral smear, exam-  -Slight macrocytic anemia  -See comment    COMMENT:  This patient has slight macrocytic anemia..  Differential considerations  include vitamin B12 or folate deficiency, medications, alcohol use,  liver disease, hypothyroidism and a variety of myeloid disorders.  Evidence of red cell regeneration suggesting blood loss or hemolysis is  also present..  Spherocytes or red cell fragments are not present in the  smear to indicate an immune mediated hemolysis.    Electronically signed out by:    Alla Madden M.D.    CLINICAL HISTORY:  89 yo male with multiple medical problems admitted with sp ontaneous  extraperitoneal hemorrhage secondary to coagulopathy due to Coumadin    PERIPHERAL BLOOD DATA:    PERIPHERAL BLOOD DATA  Patient Value (Reference Range >18 year old male)  8.34  .......WBC (4.0-11.0 x 10*9/L)  3.5 .......RBC (4.4-5.9 x  10*12/L)  12.2 .......HGB (13.3-17.7 g/dL)  36.1 .......HCT (40.0-53.0 %)  103.1 .......MCV (78-100fL)  34.9 .......MCH (26.5-33.0 pg)  33.8 .......MCHC (31.5-36.5 g/dL)  17.3 .......RDW (10.0-15.0 %)  266 .......PLT (150-450 x 10*9/L)  7.43 .......RETIC (2.0-6.0%)    PERIPHERAL BLOOD DIFFERENTIAL  (Reference ranges >18 year old)    Absolute  6.84....Neutrophils,segmented and bands  (1.6 - 8.3 x 10*9/L)  0.84....Lymphocytes  (0.8 - 5.3 x 10*9/L)  0.48....Monocytes  (0 -1.3 x 10*9/L)  0.11....Eosinophils  (0 - 0.7 x 10*9/L)  0.01....Basophils  (0 - 0.2 x 10*9/L)    PERIPHERAL MORPHOLOGY:    ERYTHROCYTES: The red cells appear overall slightly decreased in number  in number, normochromic and macrocytic. Anisopoikilocytosis is slight  and non spec ific. Rouleaux is not evident.  Red cell fragments are not  increased Polychromasia is increased.    LEUKOCYTES: The white cells appear normal in number and morphology.  Dysplastic changes are not present. Left shift or circulating blasts are  not seen.    PLATELETS: The platelets appear normal in number and are well  granulated.    CPT Codes:  A: [id number]    TESTING LAB LOCATION:  [address]  [address] [phone]    COLLECTION SITE:  Client:  [facility]  Location:  SH[facility] (S)              Haptoglobin [[id number]]  Resulted: 03/17/17 1325, Result status: In process    Ordering provider: Ce Patrick MD  03/17/17 1117 Resulting lab: [facility]    Specimen Information    Type Source Collected On   Blood  03/17/17 1308            Lactate Dehydrogenase [[id number]] (Abnormal)  Resulted: 03/17/17 1144, Result status: Final result    Ordering provider: Charlene Augustin MD  03/17/17 0743 Resulting lab: [facility]    Specimen Information    Type Source Collected On     03/17/17 0743          Components       Value Reference Range Flag Lab   Lactate Dehydrogenase 540 85 - 227 U/L H FrStHsLb             Reticulocyte count [[id number]] (Abnormal)  Resulted: 03/17/17 1133, Result status: Final result    Ordering provider: Charlene Augustin MD  03/17/17 0743 Resulting lab: [facility]    Specimen Information    Type Source Collected On     03/17/17 0743          Components       Value Reference Range Flag Lab   % Retic 7.4 0.5 - 2.0 % H FrStHsLb   Absolute Retic 260.1 25 - 95 10e9/L H FrStHsLb            WBC Differential [[id number]]  Resulted: 03/17/17 1133, Result status: Final result    Ordering provider: Charlene Augustin MD  03/17/17 0743 Resulting lab: [facility]    Specimen Information    Type Source Collected On     03/17/17 0743          Components       Value Reference Range Flag Lab   Diff Method Automated Method   FrStHsLb   % Neutrophils 82.0 %  FrStHsLb   % Lymphocytes 10.1 %  FrStHsLb   % Monocytes 5.8 %  FrStHsLb   % Eosinophils 1.3 %  FrStHsLb   % Basophils 0.1 %  FrStHsLb   % Immature Granulocytes 0.7 %  FrStHsLb   Absolute Neutrophil 6.8 1.6 - 8.3 10e9/L  FrStHsLb   Absolute Lymphocytes 0.8 0.8 - 5.3 10e9/L  FrStHsLb   Absolute Monocytes 0.5 0.0 - 1.3 10e9/L  FrStHsLb   Absolute Eosinophils 0.1 0.0 - 0.7 10e9/L  FrStHsLb   Absolute Basophils 0.0 0.0 - 0.2 10e9/L  FrStHsLb   Abs Immature Granulocytes 0.1 0 - 0.4 10e9/L  FrStHsLb            Comprehensive metabolic panel [[id number]] (Abnormal)  Resulted: 03/17/17 0826, Result status: Final result    Ordering provider: Ce Patrick MD  03/17/17 0000 Resulting lab: [facility]    Specimen Information    Type Source Collected On   Blood  03/17/17 0743          Components       Value Reference Range Flag Lab   Sodium 138 133 - 144 mmol/L  FrStHsLb   Potassium 4.4 3.4 - 5.3 mmol/L  FrStHsLb   Chloride 101 94 - 109 mmol/L  FrStHsLb   Carbon Dioxide 27 20 - 32 mmol/L  FrStHsLb   Anion Gap 10 3 - 14 mmol/L  FrStHsLb   Glucose 97 70 - 99 mg/dL  FrStHsLb   Urea Nitrogen 33 7 - 30 mg/dL H FrStHsLb    Creatinine 1.21 0.66 - 1.25 mg/dL  FrStHsLb   GFR Estimate 56 >60 mL/min/1.7m2 L FrStHsLb   Comment:  Non  GFR Calc   GFR Estimate If Black 68 >60 mL/min/1.7m2  FrStHsLb   Comment:  African American GFR Calc   Calcium 8.8 8.5 - 10.1 mg/dL  FrStHsLb   Bilirubin Total 4.7 0.2 - 1.3 mg/dL H FrStHsLb   Albumin 2.7 3.4 - 5.0 g/dL L FrStHsLb   Protein Total 6.7 6.8 - 8.8 g/dL L FrStHsLb   Alkaline Phosphatase 81 40 - 150 U/L  FrStHsLb   ALT 23 0 - 70 U/L  FrStHsLb   AST 42 0 - 45 U/L  FrStHsLb            Magnesium [[id number]] (Abnormal)  Resulted: 03/17/17 0826, Result status: Final result    Ordering provider: Ce Patrick MD  03/17/17 0000 Resulting lab: [facility]    Specimen Information    Type Source Collected On   Blood  03/17/17 0743          Components       Value Reference Range Flag Lab   Magnesium 2.4 1.6 - 2.3 mg/dL H FrStHsLb            Bilirubin direct [[id number]] (Abnormal)  Resulted: 03/17/17 0826, Result status: Final result    Ordering provider: Charlene Augustin MD  03/17/17 0601 Resulting lab: [facility]    Specimen Information    Type Source Collected On     03/17/17 0743          Components       Value Reference Range Flag Lab   Bilirubin Direct 3.3 0.0 - 0.2 mg/dL H FrStHsLb            INR [[id number]] (Abnormal)  Resulted: 03/17/17 0819, Result status: Final result    Ordering provider: Charlene Augustin MD  03/17/17 0000 Resulting lab: [facility]    Specimen Information    Type Source Collected On   Blood  03/17/17 0743          Components       Value Reference Range Flag Lab   INR 2.63 0.86 - 1.14 H FrStHsLb            CBC with platelets [[id number]] (Abnormal)  Resulted: 03/17/17 0806, Result status: Final result    Ordering provider: Ce Patrick MD  03/17/17 0000 Resulting lab: [facility]    Specimen Information    Type Source Collected On   Blood  03/17/17 0743          Components       Value  Reference Range Flag Lab   WBC 8.3 4.0 - 11.0 10e9/L  FrStHsLb   RBC Count 3.50 4.4 - 5.9 10e12/L L FrStHsLb   Hemoglobin 12.2 13.3 - 17.7 g/dL L FrStHsLb   Hematocrit 36.1 40.0 - 53.0 % L FrStHsLb    78 - 100 fl H FrStHsLb   MCH 34.9 26.5 - 33.0 pg H FrStHsLb   MCHC 33.8 31.5 - 36.5 g/dL  FrStHsLb   RDW 17.3 10.0 - 15.0 % H FrStHsLb   Platelet Count 266 150 - 450 10e9/L  FrStHsLb            Blood culture [[id number]]  Resulted: 03/17/17 0553, Result status: Final result    Ordering provider: Candi Kapoor MD  03/11/17 1745 Resulting lab: [facility]    Specimen Information    Type Source Collected On   Blood  03/11/17 1802          Components       Value Reference Range Flag Lab   Specimen Description Blood Left Arm   FrStHsLb   Special Requests Aerobic and anaerobic bottles received   FrStHsLb   Culture Micro No growth   226   Micro Report Status FINAL 03/17/2017   226            Blood culture [[id number]]  Resulted: 03/17/17 0553, Result status: Final result    Ordering provider: Candi Kapoor MD  03/11/17 1745 Resulting lab: [facility]    Specimen Information    Type Source Collected On   Blood  03/11/17 1810          Components       Value Reference Range Flag Lab   Specimen Description Blood Right Wrist   FrStHsLb   Special Requests Aerobic and anaerobic bottles received   FrStHsLb   Culture Micro No growth   226   Micro Report Status FINAL 03/17/2017   226            INR [[id number]] (Abnormal)  Resulted: 03/16/17 0807, Result status: Final result    Ordering provider: Charlene Augustin MD  03/16/17 0000 Resulting lab: [facility]    Specimen Information    Type Source Collected On   Blood  03/16/17 0744          Components       Value Reference Range Flag Lab   INR 2.84 0.86 - 1.14 H FrStHsLb            Lactic acid level STAT [[id number]] (Abnormal)  Resulted: 03/16/17 0306, Result status: Final result    Ordering provider: Ce Patrick MD  03/16/17  "0228 Resulting lab: [facility]    Specimen Information    Type Source Collected On   Blood  03/16/17 0300          Components       Value Reference Range Flag Lab   Lactic Acid 2.3 0.7 - 2.1 mmol/L H FrStHsLb            Testing Performed By     Lab - Abbreviation Name Director Address Valid Date Range    14 - FrStHsLb [facility] Unknown [address] [place] [address] 05/08/15 1057 - Present    45 - AIR307 [facility] Unknown Unknown 01/28/02 0000 - Present    88 - Unknown [facility] Unknown Unknown 10/30/02 0000 - Present    170 - Unknown POINT OF CARE TEST, GLUCOSE Unknown Unknown 10/31/11 1114 - Present    226 - Unknown [facility] [address] [facility] [address] 12/19/14 0955 - Present            Unresulted Labs (24h ago through future)    Start       Ordered    03/18/17 0730  INR  (warfarin (COUMADIN) Pharmacy Consult-INITIAL ORDER)  DAILY,   Routine      03/18/17 0721    Unscheduled  Blood gas blood with oxy  CONDITIONAL X 3,   Routine     Comments:  Release order if patient in respiratory distress and Notify Provider.    03/07/17 1640    Unscheduled  Glucose  CONDITIONAL X 3,   Routine     Comments:  Release order if patient experiencing hyperglycemia or hypoglycemia symptoms and Notify Provider.    03/07/17 1640    Unscheduled  Hemoglobin  CONDITIONAL X 3,   Routine     Comments:  Release order if patient experiencing active bleeding and/or hypotension and Notify Provider.    03/07/17 1640    Unscheduled  Potassium  (Potassium Replacement - \"Standard\" - For K levels less than 3.4 mmol/L - UU,UR,UA,RH,SH,PH,WY )  CONDITIONAL (SPECIFY),   Routine     Comments:  Obtain Potassium Level for these conditions:  *IF no potassium result within 24 hours before initiation of order set, draw potassium level with next lab collect.    *2 HOURS AFTER last IV potassium replacement dose and 4 hours after an oral replacement dose.  *Next morning after potassium dose.   " "  Repeat Potassium Replacement if necessary.    03/11/17 0804    Unscheduled  Magnesium  (Magnesium Replacement -  Adult - \"Standard\" - Replacement for all levels less than 1.6 mg/dL )  CONDITIONAL (SPECIFY),   Routine     Comments:  Obtain Magnesium Level for these conditions:  *IF no magnesium result within 24 hrs before initiation of order set, draw magnesium level with next lab collect.    *2 HOURS AFTER last magnesium replacement dose when magnesium replacement given for level less than 1.6   *Next morning after magnesium dose.     Repeat Magnesium Replacement if necessary.    03/19/17 0613         Imaging Results - 3 Days      US Abdomen Complete [[id number]]  Resulted: 03/17/17 2109, Result status: Final result    Ordering provider: Ce Patrick MD  03/17/17 1116 Resulted by: Gordon Coley MD    Performed: 03/17/17 1953 - 03/17/17 2032 Resulting lab: [facility] RES[facility]    Narrative:       ULTRASOUND ABDOMEN COMPLETE  3/17/2017  8:32 PM     HISTORY: Elevated total bilirubin.    COMPARISON: None.    FINDINGS: Liver is coarse and increased in echogenicity without focal  solid lesions. Gallbladder is not well seen due to overlying  shadowing, no definite gallbladder wall thickening or cholelithiasis  demonstrated. Extrahepatic bile duct is not seen. Pancreas is  completely obscured. Spleen is normal. Kidneys are normal in size.  There is no hydronephrosis. Tiny left renal cyst. Visualized abdominal  aorta and IVC are nonaneurysmal.      Impression:       IMPRESSION: Limited exam, coarse increased echogenicity of the liver  which is nonspecific.    GORDON COLEY MD      CT Chest w/o Contrast [[id number]]  Resulted: 03/16/17 1749, Result status: Final result    Ordering provider: Ce Patrick MD  03/16/17 1438 Resulted by: Sina Chacko MD    Performed: 03/16/17 1610 - 03/16/17 1633 Resulting lab: RADIOLOGY RESULTS    Narrative:       CT CHEST WITHOUT CONTRAST  3/16/2017 4:33 PM     HISTORY: " Intractable non-productive cough.    TECHNIQUE: Volumetric acquisition of the chest  without contrast. .  Radiation dose for this scan was reduced using automated exposure  control, adjustment of the mA and/or kV according to patient size, or  iterative reconstruction technique.    COMPARISON: None.    FINDINGS: Patchy bilateral areas of groundglass and interstitial  infiltrate, greater on the left. 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 Small right pleural  effusion. Borderline cardiomegaly. Extensive coronary artery  calcifications. There are a few small, nonspecific mediastinal lymph  nodes. Atheromatous calcification of the thoracic aorta. The thoracic  aorta is mildly aneurysmal measuring 4 cm in the ascending aorta and  4.3 cm in diameter in the upper descending thoracic aorta. Adjacent to  the upper descending thoracic aorta there is a small rind of density  measuring approximately 2.8 x 1.4 x 6.3 cm in AP, transverse and  craniocaudad dimensions. (Axial image 23 and coronal image 45). This  is of uncertain etiology and significance and may be chronic. It could  be minimal loculated fluid though it measures higher density than  simple fluid. It could also be a thin rind of soft tissue density of  uncertain significance. It is conceivable that it could arise from the  aorta such as a contained leak though that is felt to be less likely.  Correlate clinically. Advanced degenerative changes in the thoracic  spine. Hemangioma in a lower thoracic vertebral body.      Impression:       IMPRESSION:  1. Patchy groundglass and interstitial opacities, greater on the left  which could be due to pneumonia or edema. Small right pleural  effusion. Correlate clinically to exclude CHF.  2. Mild thyromegaly. Extensive coronary artery calcifications.  3. Mildly aneurysmal thoracic aorta measuring 4 cm in the ascending  aorta  and 4.3 cm in the upper descending thoracic aorta.  4. Small rind of soft tissue density adjacent to the upper descending  thoracic aorta of uncertain significance. While it could be some  loculated fluid is not the density of simple fluid. This could be  complex fluid or soft tissue or scarring. A contained leak from the  thoracic aorta could not be completely excluded. This is felt to most  likely not be an acute finding. However clinical correlation  recommended. Consider follow-up imaging with contrast or comparison  with old studies.    Findings discussed with Dr. Patrick at 5:50 PM    MUNIR SAINI MD      Testing Performed By     Lab - Abbreviation Name Director Address Valid Date Range    104 - Rad Rslts RADIOLOGY RESULTS Unknown Unknown 05 1553 - Present               ECG/EMG Results      Echocardiogram Complete [[id number]]  Resulted: 03/10/17 0928, Result status: In process    Ordering provider: Katie Rogers MD  17 Performed: 03/10/17 0928 - 03/10/17 0928    Resulting lab: [facility]             Echo Complete with Lumason [[id number]]  Resulted: 03/10/17 1054, Result status: Edited Result - FINAL    Ordering provider: Katie Rogers MD  17 Resulted by: Saurav Davila MD    Performed: 03/10/17 0928 - 03/10/17 1131 Resulting lab: RADIOLOGY RESULTS    Narrative:       [id number]  [id number]  [id number]  [id number]^KEN^KATIE^FRANCISCA           [facility]  Echocardiography Laboratory  [address] [place] [address]        Name: HYUN COTA  MRN: [id number]  : 1926  Study Date: 03/10/2017 10:54 AM  Age: 89 yrs  Gender: Male  Patient Location: [facility]  Reason For Study: CHF  Ordering Physician: KATIE ROGERS  Performed By: Greg Macdonald RDCS     BSA: 2.1 m2  Height: 72 in  Weight: 198 lb  HR: 89  BP: 94/55 mmHg  _____________________________________________________________________________  __        Procedure  Complete Portable  Echo Adult. Contrast Lumason.  _____________________________________________________________________________  __        Interpretation Summary     The aortic valve area and gradients may be underestimated as the cursor was  not parallel to the ascending aorta. The 2 D impression of the valve is that  it is probably mildly stenotic.  The visual ejection fraction is estimated at 50-55%.  Left ventricular systolic function is normal.  There is moderately severe (3+) tricuspid regurgitation.  The rhythm was atrial fibrillation.  The study was technically difficult.  _____________________________________________________________________________  __        Left Ventricle  The left ventricle is normal in size. There is mild to moderate concentric  left ventricular hypertrophy. E by E prime ratio is greater than 15, that  likely suggests increased left ventricular filling pressures. The visual  ejection fraction is estimated at 50-55%. Left ventricular systolic function  is normal. Regional wall motion abnormalities cannot be excluded due to  limited visualization.     Right Ventricle  The right ventricle is moderately dilated. Severely decreased right  ventricular systolic function.     Atria  The left atrium is moderately dilated. The right atrium is severely dilated.  There is no color Doppler evidence of an atrial shunt.     Mitral Valve  There is moderate mitral annular calcification. The mitral valve leaflets are  mildly thickened. There is trace mitral regurgitation.        Tricuspid Valve  There is moderately severe (3+) tricuspid regurgitation. The right ventricular  systolic pressure is approximated at 42.3 mmHg plus the right atrial pressure.     Aortic Valve  The aortic valve is trileaflet. There is trace aortic regurgitation. The peak  AoV pressure gradient is 4.0 mmHg. The mean AoV pressure gradient is 2.0 mmHg.  The aortic valve area and gradients may be underestimated as the cursor was  not parallel to the  ascending aorta. The 2 D impression of the valve is that  it is probably mildly stenotic.     Pulmonic Valve  There is no pulmonic valvular regurgitation.     Vessels  Mild aortic root dilatation. The ascending aorta is Mildly dilated. Dilated  inferior vena cava.     Pericardium  There is no pericardial effusion.        Rhythm  The rhythm was atrial fibrillation.  _____________________________________________________________________________  __  MMode/2D Measurements & Calculations     IVSd: 1.3 cm  LVIDd: 4.0 cm  LVIDs: 3.0 cm  LVPWd: 1.5 cm  FS: 23.9 %  EDV(Teich): 69.1 ml  ESV(Teich): 35.8 ml  LV mass(C)d: 215.6 grams  Ao root diam: 4.1 cm  asc Aorta Diam: 4.0 cm  LVOT diam: 2.3 cm  LVOT area: 4.1 cm2  LA Volume (BP): 98.3 ml  LA Volume Index (BP): 46.4 ml/m2           Doppler Measurements & Calculations  MV E max payton: 76.2 cm/sec  MV dec time: 0.19 sec  Ao V2 max: 104.2 cm/sec  Ao max P.0 mmHg  Ao V2 mean: 65.2 cm/sec  Ao mean P.0 mmHg  Ao V2 VTI: 14.5 cm  PIOTR(I,D): 1.9 cm2  PIOTR(V,D): 1.6 cm2  AI P1/2t: 434.0 msec  LV V1 max P.66 mmHg  LV V1 max: 40.5 cm/sec  LV V1 VTI: 6.7 cm  SV(LVOT): 27.6 ml  PA acc time: 0.08 sec  PI end-d payton: 172.4 cm/sec  TR max payton: 324.4 cm/sec  TR max P.3 mmHg  PIOTR Index (cm2/m2): 0.90  Lateral E/e': 15.9  Medial E/e': 16.3              _____________________________________________________________________________  __        Report approved by: Soco Cline 03/10/2017 12:02 PM       1    Type Source Collected On     03/10/17 1054          View Image (below)              Encounter-Level Documents:     There are no encounter-level documents.      Order-Level Documents:     There are no order-level documents.

## 2017-03-07 NOTE — PROVIDER NOTIFICATION
Prescriber Notification Note    The pharmacist has communicated with this patient's provider regarding a concern or therapy recommendation.    Notified Person: Kemi Duong  Date/Time of Notification: 3/7/17 1410  Interaction: Face to face  Concern/Recommendation:discussed INR monitoring. INR to be ordered to be drawn 30 minutes after [employer] has finished infusing.     Comments/Additional Details:pharmacist will order the INR. Prescriber will be contacted if INR is still >2.

## 2017-03-07 NOTE — PHARMACY-ADMISSION MEDICATION HISTORY
Admission medication history interview status for the 3/7/2017  admission is complete. See EPIC admission navigator for prior to admission medications     Medication history source reliability:Poor    Actions taken by pharmacist (provider contacted, etc):called [facility] and spoke to Kimmie, a nurse there. She went over the entire list with me. There were several additions/deletions from the list that had been sent with him. The med list source was poor due to [facility] not having access to their computers for a week. All information was from his paper records and the information required extensive clarification and repeating with the nurse.    Additional medication history information not noted on PTA med list :citalopram was d/c'd 2/28. The afternoon furosemide was started 3/2. Tamiflu was started 3/6. Warfarin details were confirmed (not included in list)    Medication reconciliation/reorder completed by provider prior to medication history? No    Time spent in this activity: 45 minutes    Prior to Admission medications    Medication Sig Last Dose Taking? Auth Provider   Potassium Chloride Mayra CR (K-DUR PO) Take 20 mEq by mouth daily  Yes Unknown, Entered By History   polyethylene glycol (MIRALAX/GLYCOLAX) Packet Take 1 packet by mouth daily as needed for constipation Give every 4 days if no stool  Yes Unknown, Entered By History   METOPROLOL TARTRATE PO Take 50 mg by mouth 2 times daily  Yes Unknown, Entered By History   MECLIZINE HCL PO Take 25 mg by mouth every 6 hours as needed for dizziness  Yes Unknown, Entered By History   MAGNESIUM OXIDE PO Take 400 mg by mouth At Bedtime  Yes Unknown, Entered By History   Levothyroxine Sodium (SYNTHROID PO) Take 200 mcg by mouth daily  Yes Unknown, Entered By History   Furosemide (LASIX PO) Take 40 mg by mouth daily  Yes Unknown, Entered By History   VITAMIN D, CHOLECALCIFEROL, PO Take 4,000 Units by mouth daily  Yes Unknown, Entered By History   AMOXICILLIN PO  Take 2,000 mg by mouth once One hour before dental cleaning  Yes Unknown, Entered By History   Acetaminophen (TYLENOL PO) Take 650 mg by mouth 4 times daily  Yes Unknown, Entered By History   prochlorperazine (COMPAZINE) 25 MG Suppository Place 25 mg rectally every 12 hours as needed for nausea  Yes Unknown, Entered By History   TraMADol HCl (ULTRAM PO) Take 25 mg by mouth every 6 hours as needed for moderate to severe pain  Yes Unknown, Entered By History   mineral oil-hydrophilic petrolatum (AQUAPHOR) Apply topically 2 times daily as needed for dry skin  Yes Unknown, Entered By History   Hypromellose (HYDROXYPROPYL METHYLCELLULOSE OP) Place into both eyes 3 times daily  Yes Unknown, Entered By History   FUROSEMIDE PO Take 20 mg by mouth daily (with lunch) At 1400  Yes Unknown, Entered By History   WARFARIN SODIUM PO Take by mouth See Admin Instructions 6 mg Tuesday, Thursday. 5 mg the rest of the week. 3/6/2017 at Unknown time Yes Unknown, Entered By History   sennosides (SENOKOT) 8.6 MG tablet Take 1 tablet by mouth At Bedtime  Yes Unknown, Entered By History   Oseltamivir Phosphate (TAMIFLU PO) Take 30 mg by mouth daily For ten days, starting 3/6/17  Yes Unknown, Entered By History

## 2017-03-07 NOTE — IP AVS SNAPSHOT
` `     [place] [address] [facility] SPECIAL[facility] UNIT: [phone]                 INTERAGENCY TRANSFER FORM - NOTES (H&P, Discharge Summary, Consults, Procedures, Therapies)   3/7/2017                    Hospital Admission Date: 3/7/2017  HYUN COTA   : 1926  Sex: Male        Patient PCP Information     None on File         History & Physicals      H&P signed by Kemi Duong PA-C at 3/7/2017 11:39 AM  Also signed by Esteban Voss MD at 3/7/2017  3:30 PM      Author:  Kemi Duong PA-C Service:  Hospitalist Author Type:  Physician Assistant - C    Filed:  3/7/2017 11:39 AM Date of Service:  3/7/2017 11:03 AM Note Created:  3/7/2017 11:26 AM    Status:  Attested :  Kemi Duong PA-C (Physician Assistant - C)    Cosigner:  Esteban Voss MD at 3/7/2017  3:30 PM        Attestation signed by Esteban Voss MD at 3/7/2017  3:30 PM        Physician Attestation   IEsteban, saw and evaluated Hyun Cota as part of a shared visit.  I have reviewed and discussed with the advanced practice provider their history, physical and plan.    I personally reviewed the vital signs, medications, labs and imaging.    My key history or physical exam findings: Mr Cota is a 89 yr m with h/o a fib on chronic anticoagulation, hypothyroidism, diet controlled DM, BPH, h/o colon ca (s/p colectomy) who [facility]  With extraperitoneal hematoma anterior to bladder in the setting of supratherapeutic INR. His INR was 5.8. The hematoma was approx 8 cm. He was given Vitamin K and transferred here.     Repeat Ct abdomen here shows worsening hematoma (An extraperitoneal hematoma along the anterior aspect of the  urinary bladder has increased in size since the outside exam performed earlier today, now measuring up to 11.6 cm in greatest dimension);   INR 5.8---->2.48.; lactate 3.4  Hb 13.8---13.4---->12.7    Vitals: BP soft, systolic  80s-90s, HR 120s--130s, a fib with RVR  Patient alert, awake, oriented X 3  Abd: RLQ tenderness +    Key management decisions made by me:  # Extraperitoneal hematoma anterior to bladder  - reversing INR further with Vitamin K, kcentra and FFP; will recheck INR  - transfer to ICU for close monitoring; d/w intensivist Dr Wilson  - IR aware and plans for angiogram if becomes further hemodynamically unstable  - we also talked to Dr Doran from surgery who will evaluate the patient today  - lactate elevated but patient not septic (see sepsis note)    # A fib with RVR  - difficult to state but probably tachycardia driving the BP down  - will order 1 litre fluid bolus; c/w maintenance IVF  - metoprolol 2.5 mg IV once and q 6 hrs prn for HR>120 with hold parameters for SBP<90    Esteban Voss  Date of Service (when I saw the patient): 03/07/17                               PRIMARY CARE PROVIDER:  [facility]       History obtained  from the patient and extensive review of medical records from outside hospital.      HISTORY OF PRESENT ILLNESS:  Mason Gonzalez is a 89-year-old male with past medical history of hypothyroidism and diet-controlled type 2 diabetes, obstructive sleep apnea and CHF as well as atrial fibrillation on chronic anticoagulation with warfarin, who presented initially to the [facility] for evaluation of abdominal pain.  He reports 4-5 days of progressive abdominal pain with diminished oral intake.  He was evaluated at outside medical center.  Laboratory evaluation was relatively unremarkable with the exception of the INR 5.8.  A CT of abdomen and pelvis was obtained which showed an 8.4 cm extraperitoneal hematoma around the bladder.  He was given IV fluids and his hemoglobin was stable at 13.6.  His INR was reversed first with vitamin K.  There were no beds at the [facility] so transfer to [facility] was requested.      Presently the patient is  evaluated in his hospital room.  He did provide a limited history.  He continues to complain of abdominal pain.  No current nausea or vomiting but does note diminished oral intake recently.  Denies fevers or chills.  Last bowel movement was 5 days ago.  Denies any difficulty urinating.  Denies recent trauma or fall.  No recent instrumentation of the bladder.      PAST MEDICAL HISTORY:   1.  Hypothyroidism.   2.  Diet-controlled type 2 diabetes.   3.  BPH.   4.  Dyslipidemia.   5.  Obstructive sleep apnea, does not use CPAP.   6.  Depression.   7.  GERD.   8.  History of colon cancer, status post right colectomy.   9.  Hypertension.   10.  Spinal stenosis.   11.  Atrial fibrillation on chronic anticoagulation.   12.  History of iliac artery aneurysm.   13.  History of congestive heart failure, unspecified.        PRIOR TO ADMISSION MEDICATIONS:   Prescriptions Prior to Admission   Medication Sig Dispense Refill Last Dose     Potassium Chloride Mayra CR (K-DUR PO) Take 20 mEq by mouth daily        polyethylene glycol (MIRALAX/GLYCOLAX) Packet Take 1 packet by mouth daily as needed for constipation Give every 4 days if no stool        METOPROLOL TARTRATE PO Take 50 mg by mouth 2 times daily        MECLIZINE HCL PO Take 25 mg by mouth every 6 hours as needed for dizziness        MAGNESIUM OXIDE PO Take 400 mg by mouth At Bedtime        Levothyroxine Sodium (SYNTHROID PO) Take 200 mcg by mouth daily        Furosemide (LASIX PO) Take 40 mg by mouth daily        VITAMIN D, CHOLECALCIFEROL, PO Take 4,000 Units by mouth daily        AMOXICILLIN PO Take 2,000 mg by mouth once One hour before dental cleaning        Acetaminophen (TYLENOL PO) Take 650 mg by mouth 4 times daily        prochlorperazine (COMPAZINE) 25 MG Suppository Place 25 mg rectally every 12 hours as needed for nausea        TraMADol HCl (ULTRAM PO) Take 25 mg by mouth every 6 hours as needed for moderate to severe pain        mineral oil-hydrophilic petrolatum  (AQUAPHOR) Apply topically 2 times daily as needed for dry skin        Hypromellose (HYDROXYPROPYL METHYLCELLULOSE OP) Place into both eyes 3 times daily        FUROSEMIDE PO Take 20 mg by mouth daily (with lunch) At 1400        WARFARIN SODIUM PO Take by mouth See Admin Instructions 6 mg Tuesday, Thursday. 5 mg the rest of the week.   3/6/2017 at Unknown time     sennosides (SENOKOT) 8.6 MG tablet Take 1 tablet by mouth At Bedtime        Oseltamivir Phosphate (TAMIFLU PO) Take 30 mg by mouth daily For ten days, starting 3/6/17             PAST SURGICAL HISTORY:    1.  Bilateral shoulder arthroplasty.   2.  Cholecystectomy.     3.  Right hemicolectomy.   4.  Hip repair.      FAMILY HISTORY:  Reviewed and noncontributory.      SOCIAL HISTORY:  He currently resides in a long-term care facility.  He denies current tobacco or alcohol use.      REVIEW OF SYSTEMS:  A 10-point review of systems was completed.  Pertinent positives are noted in HPI, other systems negative.      PHYSICAL EXAMINATION:   GENERAL:  Patient is a well-developed, well-nourished 89-year-old frail feeling male who appears his stated age and is lying in bed in no acute distress.   VITAL SIGNS:  Blood pressure is 100/68, pulse 97, temperature 98.6, O2 sat 98% on room air.   HEENT:  Normocephalic, atraumatic.  Eyes:  Pupils equal, round, reactive to light, lids and sclerae are clear, extraocular movements intact.  Oropharynx is midline.  Pharynx is clear.  Mucous membranes are dry.   NECK:  Supple, no adenopathy, no thyromegaly.   CARDIOVASCULAR:  Irregularly irregular, rate controlled.  No murmurs appreciated.   PULMONARY:  Normal effort, crackles in bilateral bases.   ABDOMEN:  Slightly distended, diminished bowel sounds.  Abdomen is firm to palpation with right lower quadrant tenderness with mild guarding.   EXTREMITIES:  Moves all 4 extremities.  Dorsalis pedis and radial pulses are palpated bilaterally, lower extremities without edema.    NEUROLOGIC:  He is alert and oriented.  Cranial nerves II-XII are grossly intact.      LABORATORY DATA:  Reviewed in Epic.      ASSESSMENT:  Patient is a 89-year-old male with past medical history of atrial fibrillation on chronic anticoagulation, hypothyroidism and diet-controlled type 2 diabetes who presented from outside hospital for evaluation of an extraperitoneal bladder hemorrhage.   1.  Acute abdominal pain, likely secondary to extraperitoneal bladder hemorrhage.  [facility] showed an 8.4 cm extraperitoneal hemorrhage in the right pelvis surrounding the bladder.  His hemoglobin was stable at 13.6; however, INR was elevated at 5.8.  His INR was reversed with vitamin K and he was transferred to [facility].  He currently remains hemodynamically stable.  Repeat laboratory evaluation revealed stable hemoglobin as well improved INR of 2.48.  He has been typed and screened.  I have discussed with Radiology and will plan to repeat a noncontrast abdominal CT to reassess bleeding.  If progressing, will discuss with Interventional Radiology to see if coiling would be appropriate.  If appears stable, will consult General Surgery to assist and follow along and medically manage.  For now he will be n.p.o. pending workup.  Pain control will be provided with IV Dilaudid.  Will follow serial hemoglobins.   2.  Atrial fibrillation on chronic anticoagulation.  [facility]  I could not see in records how much vitamin K he was given; however, his INR has trended down from 5.8 to 2.48.  He did not receive FFP.  Warfarin is on hold.  We will also hold prior to admission metoprolol for now given soft BPs.  Monitor on continuous telemetry.  Resume as indicated.   3.  Hypothyroidism.  Continue prior to admission Synthroid.   4.  Congestive heart failure, unspecified.  The patient has documentation of CHF in [facility].  I have no echocardiograms available.  Currently appears euvolemic.  Will hold his prior to admission Lasix.  He will be hydrated with IV fluids.  Monitor I's and O's and daily weights.   5.  Obstructive sleep apnea, does not use CPAP.   6.  Influenza prophylaxis.  The patient was initiated on Tamiflu prophylaxis at [facility] for 10 days.  We will continue.   7.  Deep venous thrombosis prophylaxis:  PCDs.      CODE STATUS:  The patient has a POLST in his chart which states he is full code.  Discussed with the patient and he confirms full code.      The patient was discussed with Dr. Voss of the Hospitalist Service who independently interviewed the patient.  He is in agreement with the above plan.         MACKENZIE VOSS MD       As dictated by KEMI LAW PA-C            D: 2017 11:03   T: 2017 11:26   MT: #[id number]      Name:     HYUN COTA   MRN:      [id number]        Account:      [id number]   :      1926           Admitted:     [id number]      Document: [id number]       cc: [facility][KG1.1]         Revision History        User Key Date/Time User Provider Type Action    > KG1.1 3/7/2017 11:39 AM Kemi Law PA-C Physician Assistant Juan GREEN Sign     [N/A] 3/7/2017 11:26 AM Kemi Law PA-C Physician Assistant - NORMA Edit                  Discharge Summaries     No notes of this type exist for this encounter.         Consult Notes      Consults signed by Kian Ramsey MD at 3/18/2017  5:33 PM      Author:  Kian Ramsey MD Service:  (none) Author Type:  Physician    Filed:  3/18/2017  5:33 PM Date of Service:  3/17/2017 12:30 PM Note Created:  3/17/2017  1:39 PM    Status:  Signed :  Kian Ramsey MD (Physician)         PULMONARY CONSULTATION      DATE OF DICTATION:  2017      REQUESTING PHYSICIAN:  None stated.       REASON FOR CONSULTATION:  Cough.      HISTORY OF PRESENT ILLNESS:   Mason Gonzalez is a 89-year-old male with multiple medical problems who was admitted to [facility], 03/07/2017, with spontaneous extraperitoneal hemorrhage secondary to coagulopathy from his Coumadin.  He is maintained on chronic anticoagulation for atrial fibrillation.  He presented to an outside hospital where a CT scan showed an 8.4 cm extraperitoneal hemorrhage in the right pelvis surrounding the bladder.  INR was elevated at 5.8.  He was treated with vitamin K.      HOSPITAL COURSE:  Has been complicated by development of suspected healthcare-associated pneumonia as the patient developed some wheezing, dyspnea and dry cough.  Chest x-ray on 03/08/2017 showed some mild interstitial changes, but no focal infiltrates.  Repeat chest x-ray, 03/11, showed increased opacity suggestive of worsening pneumonia.  CT scan of the chest done yesterday showed patchy ground-glass and interstitial opacities, left greater than right, consistent with pneumonia versus edema.  He has been started on antibiotics.  We were consulted regarding a persistent intractable, typically nonproductive cough which caused him to sleep poorly last night.  He has not been having any fevers, chills or night sweats.  His cough is typically nonproductive.  He denies chest pain or hemoptysis.  He has been receiving a variety of respiratory therapy without any clear benefit to his cough.  He also has a history of obstructive sleep apnea, but does not use CPAP.      CURRENT MEDICATIONS:  Include Tessalon Perles, Lanoxin, Lasix, DuoNeb, Synthroid, Claritin, Lopressor, Remeron, Protonix, Zosyn, Senokot, Coumadin, lidocaine nebs and Mucomyst.      PAST MEDICAL HISTORY:   1.  Atrial fibrillation, on chronic anticoagulation.   2.  Hypothyroidism.   3.  Diabetes mellitus.   4.  BPH.   5.  Obstructive sleep apnea, does not use CPAP.   6.  Depression.   7.  GERD.   8.  History of colon cancer status post right colectomy.   9.  Hypertension.   10.   Spinal stenosis.      ALLERGIES:  Codeine and lisinopril.      SOCIAL HISTORY:  The patient is not currently smoking.      FAMILY HISTORY AND REVIEW OF SYSTEMS:  Noncontributory.      PHYSICAL EXAMINATION:   GENERAL:  Reveals an elderly male, extremely hard of hearing, who refuses to put in his hearing aids and provides limited history.   VITAL SIGNS:  He is afebrile, respiratory rate is 24, pulse 99, blood pressure 92/54, oxygen saturations 93% to 95% on room air.   HEENT:  Grossly unremarkable.   NECK:  Supple.   LUNGS:  Revealed decreased breath sounds, slightly coarse bilaterally with a few crackles, but no wheezes.   CARDIOVASCULAR:  Revealed regular rate and rhythm.   ABDOMEN:  Soft and nontender.   EXTREMITIES:  Showed no cyanosis, clubbing or edema.      LABORATORY TESTING:  Electrolytes are normal.  BUN 33, creatinine 1.2.  White count 8.3, hemoglobin 12.2, hematocrit 36.1, platelet count is normal.  INR is now 2.63.      IMAGING STUDIES:  As above.      ASSESSMENT:  A 89-year-old male with multiple medical problems admitted with a spontaneous extraperitoneal hemorrhage secondary to coagulopathy.  Hospital course complicated by suspected healthcare-acquired pneumonia.  CT scan of the chest, 03/16, showed patchy bilateral areas of ground-glass and interstitial infiltrates, left greater than right.  The patient's care has been compromised by his refusal of medical therapy.  His nurse raises concern for possible aspiration.  His untreated sleep apnea places him at risk for nocturnal reflux.  Differential diagnosis of his cough includes pneumonia, aspiration, reflux and potentially sinus drainage.  His oxygenation is currently stable on room air.      PLAN:   1.  Bronchodilators, continue DuoNeb, but discontinued the Mucomyst, as this could be irritating his airways.   2.  Antibiotics, Zosyn.   3.  Steroids, start Solu-Medrol.   4.  Lasix as ordered.   5.  Antitussives, increase Tessalon Perles 200 mg t.i.d.    6.  Discontinue lidocaine nebs, as this increases the patient's risk for aspiration.   7.  PPI   8.  Protonix.   9.  Aspiration precautions.   10.  Follow chest x-ray.      I appreciate the opportunity to participate in his care.         KIAN WHITLOCK MD             D: 2017 12:30   T: 2017 13:38   MT: [id number]      Name:     MASON GONZALEZ   MRN:      [id number]        Account:       [id number]   :      1926           Consult Date:  2017      Document: [id number]       cc: Ce Patrick MD   [MK1.1]   Revision History        User Key Date/Time User Provider Type Action    > MK1.1 3/18/2017  5:33 PM Kian Whitlock MD Physician Sign            Consults by Nikita Doran MD at 3/7/2017  5:47 PM     Author:  Nikita Doran MD Service:  Surgery Author Type:  Physician    Filed:  3/7/2017  5:47 PM Date of Service:  3/7/2017  5:47 PM Note Created:  3/7/2017  5:40 PM    Status:  Signed :  Nikita Doran MD (Physician)         Surgery Consultation      Mason Gonzalez MRN# [id number]   YOB: 1926 Age: 89 year old   Date of Admission: 3/7/2017     Reason for consult: I was asked by Dr. Voss to evaluate this patient for extraperitoneal spontaneous hematoma.           Assessment and Plan:[LL1.1]   Active Problems:[LL1.2]    Extraperitoneal[LL1.1] hemorrhage    Assessment:[LL1.2] clinically stable, Hgb stable at this point, having INR reverse[LL1.1]    Plan:[LL1.2] observation, based on clinical response, might require exploration for evacuation or control of bleeding, but not likely at this time.              Chief Complaint:   Lower abdominal pain and extraperitoneal hematoma anterior and right of the urinary bladder.    History is obtained from the patient and electronic health record         History of Present Illness:   This patient is a 89 year old male who presents with lower abdominal pain pain, supratherapeutic INR and spontaneous extraperitoneal bleed.              " Past Medical History:[LL1.1]   No past medical history on file.[LL1.3]          Past Surgical History:[LL1.1]   No past surgical history on file.[LL1.3]            Social History:   Reviewed and non-contributory          Family History:[LL1.1]   No family history on file.[LL1.3]          Immunizations:[LL1.1]   There is no immunization history for the selected administration types on file for this patient.[LL1.3]          Allergies:[LL1.1]     Allergies   Allergen Reactions     Codeine Other (See Comments)     Since 2007. Gets massive migraine headaches \"like his head is going to explode\"     Lisinopril      Since 2008[LL1.3]             Medications:[LL1.1]     Prescriptions Prior to Admission   Medication Sig Dispense Refill Last Dose     Potassium Chloride Mayra CR (K-DUR PO) Take 20 mEq by mouth daily        polyethylene glycol (MIRALAX/GLYCOLAX) Packet Take 1 packet by mouth daily as needed for constipation Give every 4 days if no stool        METOPROLOL TARTRATE PO Take 50 mg by mouth 2 times daily        MECLIZINE HCL PO Take 25 mg by mouth every 6 hours as needed for dizziness        MAGNESIUM OXIDE PO Take 400 mg by mouth At Bedtime        Levothyroxine Sodium (SYNTHROID PO) Take 200 mcg by mouth daily        Furosemide (LASIX PO) Take 40 mg by mouth daily        VITAMIN D, CHOLECALCIFEROL, PO Take 4,000 Units by mouth daily        AMOXICILLIN PO Take 2,000 mg by mouth once One hour before dental cleaning        Acetaminophen (TYLENOL PO) Take 650 mg by mouth 4 times daily        prochlorperazine (COMPAZINE) 25 MG Suppository Place 25 mg rectally every 12 hours as needed for nausea        TraMADol HCl (ULTRAM PO) Take 25 mg by mouth every 6 hours as needed for moderate to severe pain        mineral oil-hydrophilic petrolatum (AQUAPHOR) Apply topically 2 times daily as needed for dry skin        Hypromellose (HYDROXYPROPYL METHYLCELLULOSE OP) Place into both eyes 3 times daily        FUROSEMIDE PO Take 20 mg " by mouth daily (with lunch) At 1400        WARFARIN SODIUM PO Take by mouth See Admin Instructions 6 mg Tuesday, Thursday. 5 mg the rest of the week.   3/6/2017 at Unknown time     sennosides (SENOKOT) 8.6 MG tablet Take 1 tablet by mouth At Bedtime        Oseltamivir Phosphate (TAMIFLU PO) Take 30 mg by mouth daily For ten days, starting 3/6/17[LL1.3]                Review of Systems:   The 5 point Review of Systems is negative other than noted in the HPI            Physical Exam:   Vitals were reviewed  Constitutional:   awake, alert, cooperative, no apparent distress, and appears stated age     Eyes:   sclera clear     ENT:   atramatic     Lungs:   no increased work of breathing, good air exchange and no retractions. O2 mask on.     Abdomen:   no scars, firm and tenderness noted in the right lower quadrant     Skin:   no lesions          Data:   All laboratory data reviewed  All imaging studies reviewed by me.[LL1.1]     Revision History        User Key Date/Time User Provider Type Action    > LL1.3 3/7/2017  5:47 PM Nikita Doran MD Physician Sign     LL1.2 3/7/2017  5:42 PM Nikita Doran MD Physician      LL1.1 3/7/2017  5:40 PM Nikita Doran MD Physician                      Progress Notes - Physician (Notes from 03/16/17 through 03/19/17)      Progress Notes by Nancy Almendarez LICSW at 3/19/2017 12:02 PM     Author:  Nancy Almendarez LICSW Service:  (none) Author Type:      Filed:  3/19/2017 12:52 PM Date of Service:  3/19/2017 12:02 PM Note Created:  3/19/2017 12:02 PM    Status:  Signed :  Nancy Almendarez LICSW ()         SW:  D:  Per Dr Patrick, patient is stable for discharge today.  Patient will return to [facility]  [phone].  Spoke with  to explain patient's d/c.  She will notify their nursing supervisor and requested d/c orders be faxed to [phone].  Writer gave  the phone number of nursing unit if their nurse  wishes to speak with our nurse.  Spoke with daughter Luann [phone] regarding d/c.  She was surprised[KH1.1] regrding d/c[KH1.2]  because when she was here visiting yesterday, nursing thought patient would be he[KH1.1]re into Monday.  Offered to have Dr Patrick call her with an update however she declined the offer.   We discussed transportation, daughter thought [facility].  [facility]ed [facility] and the [facility] department which would need to approve and arrange transportation is closed today.  Daughter said patient cannot afford to pay for [facility] transport.    PT felt patient could transfer in and out of a car, thus son will transport.[KH1.2]     Revision History        User Key Date/Time User Provider Type Action    > KH1.2 3/19/2017 12:52 PM Nancy Almendarez, DOMONIQUE  Sign     KH1.1 3/19/2017 12:02 PM Nancy Almendarez LICSW              Progress Notes by Kian Ramsey MD at 3/18/2017  5:33 PM     Author:  Kian Ramsey MD Service:  (none) Author Type:  Physician    Filed:  3/18/2017  5:38 PM Date of Service:  3/18/2017  5:33 PM Note Created:  3/18/2017  5:33 PM    Status:  Signed :  Kian Ramsey MD (Physician)         PULMONOLOGY PROGRESS NOTE  Date of service:[MK1.1] 03/18/2017[MK1.2]  [facility]    CC/Reason for Visit: Cough    SUBJECTIVE      HPI: Events of last night reviewed. Stable night. No new respiratory problems. States cough is better today, less frequent. Patient's family is at bedside, and confirms that he is coughing less.    ROS: A Problem Pertinent review of systems was negative except for items noted in HPI.    Past Medical, Family, and Social/Substance History has been reviewed: No interval changes.    OBJECTIVE[MK1.1]   /57 (BP Location: Right arm)  Pulse 65  Temp 98  F (36.7  C)  Resp 18  Ht 1.829 m (6')  Wt 88.4 kg (194 lb 14.2 oz)  SpO2 91%  BMI 26.43 kg/m2[MK1.2] 2 L/min     Temp (24hrs),  Av.7  F (36.5  C), Min:97.3  F (36.3  C), Max:98.1  F (36.7  C)       Intake/Output Summary (Last 24 hours) at 17 1733  Last data filed at 17 1600   Gross per 24 hour   Intake             1630 ml   Output             1025 ml   Net              605 ml[MK1.1]     Patient Vitals for the past 96 hrs:   Weight   17 0440 88.4 kg (194 lb 14.2 oz)   17 0500 88.5 kg (195 lb 1.7 oz)   03/15/17 0455 92.8 kg (204 lb 9.4 oz)[MK1.2]       CONSTITUTIONAL/GENERAL APPEARANCE: Alert. No Apparent Distress.  PSYCHIATRIC: Pleasant and appropriate mood and affect. Oriented x 3.  EARS, NOSE,THROAT,MOUTH: External ears and nose overall normal. Normal oral mucosa.   NECK: Neck appearance normal. No neck masses and the thyroid is not enlarged.   RESPIRATORY: Non-labored effort. Decreased BS, fairly clear anteriorly.  CARDIOVASCULAR: S1, S2, regular rate and rhythm.      LABORATORY ASSESSMENT[MK1.1]    No lab results found in last 7 days.    Recent Labs  Lab 17  0743 03/15/17  0715   WBC 8.3 7.8   RBC 3.50* 3.15*   HGB 12.2* 11.0*   HCT 36.1* 31.9*   * 101*   MCH 34.9* 34.9*   MCHC 33.8 34.5   RDW 17.3* 16.1*    273       Recent Labs  Lab 17  0615 17  0743 03/15/17  0715    138 136   POTASSIUM 4.4 4.4 4.1   CHLORIDE 99 101 101   CO2 28 27 25   ANIONGAP 8 10 10   BUN 35* 33* 40*   CR 1.21 1.21 1.24   GFRESTIMATED 56* 56* 55*   GFRESTBLACK 68 68 66   SARAH 8.5 8.8 8.5     No lab results found in last 7 days.    Recent Labs  Lab 17  0615   INR 2.01*[MK1.2]       Additional labs and/or comments:    IMAGING      CT Chest 3/16 -  IMPRESSION:  1. Patchy groundglass and interstitial opacities, greater on the left  which could be due to pneumonia or edema. Small right pleural  effusion. Correlate clinically to exclude CHF.  2. Mild thyromegaly. Extensive coronary artery calcifications.  3. Mildly aneurysmal thoracic aorta measuring 4 cm in the ascending  aorta and 4.3 cm in the  upper descending thoracic aorta.  4. Small rind of soft tissue density adjacent to the upper descending  thoracic aorta of uncertain significance. While it could be some  loculated fluid is not the density of simple fluid. This could be  complex fluid or soft tissue or scarring. A contained leak from the  thoracic aorta could not be completely excluded. This is felt to most  likely not be an acute finding. However clinical correlation  recommended. Consider follow-up imaging with contrast or comparison  with old studies.    PFT & OTHER TESTING       ASSESSMENT / PLAN[MK1.1]      Active Problems:    Intraabdominal hemorrhage[MK1.2]      ASSESSMENT: 89 yo male with multiple medical problems admitted with spontaneous extraperitoneal hemorrhage secondary to coagulopathy due to Coumadin. Consulted re: cough. Hospital course complicated by suspected HCAP. CT Chest 3/16 showed patchy bilateral areas of groundglass and interstitial infiltrate L>R. Patient's care has been compromised by his refusal of medications and therapy. RN raises concern for possible aspiration. DDx cough includes pneumonia, aspiration, reflux and sinus drainage. Cough seems better today with adjustments to medical regimen. Respiratory status stable on low flow O2.    Diagnoses  Abnl CT/CXR  R91.8  Cough   R05  Pneum aspiration J69.0    Plan:  1. Bronchodilators - DuoNebs.  2. Antibiotics - Zosyn.  3. Steroids - SoluMedrol.  4. Diuresis - Lasix as ordered.  5. Antitussives - Tessalon Perles 200 mg TID.  6. PPI - Protonix.  7. Aspiration precautions.  8. Follow CXR.    Updated family at bedside.    No further suggestions at this time. Dr. Nicole will see pt in follow-up on Monday. Please call if needed over the WE.[MK1.1]      Kian Ramsey MD[MK1.2]    [facility] / [facility]  [phone] (pager)  [phone] (office)[MK1.1]     Revision History        User Key Date/Time User Provider Type Action    > MK1.2 3/18/2017   5:38 PM Kian Ramsey MD Physician Sign     MK1.1 3/18/2017  5:33 PM Kian Ramsey MD Physician             Progress Notes by Niki Chaidez SLP at 3/18/2017  5:33 PM     Author:  Niki Chaidez SLP Service:  (none) Author Type:  Speech Language    Filed:  3/18/2017  5:34 PM Date of Service:  3/18/2017  5:33 PM Note Created:  3/18/2017  5:33 PM    Status:  Signed :  Niki Chaidez SLP (Speech Language)            03/18/17 1600   General Information   Onset Date 03/07/17   Start of Care Date 03/18/17   Referring Physician Ce Patrick MD   Patient Profile Review/OT: Additional Occupational Profile Info See Profile for full history and prior level of function   Patient/Family Goals Statement None stated   Swallowing Evaluation Bedside swallow evaluation   Behaviorial Observations Alert  (Mildly impulsive)   Mode of current nutrition Oral diet   Type of oral diet Regular;Thin liquid   Respiratory Status O2 Supply   Type of O2 supply Nasal cannula   Comments Per MD note: Pt is a 89-year-old male with past medical history of hypothyroidism and diet-controlled type 2 diabetes, obstructive sleep apnea and CHF as well as atrial fibrillation on chronic anticoagulation with warfarin, who presented initially to the [facility] for evaluation of abdominal pain.  He reports 4-5 days of progressive abdominal pain with diminished oral intake.    Clinical Swallow Evaluation   Oral Musculature generally intact   Structural Abnormalities none present   Dentition other (see comments)  (Has partials, not present at hospital)   Mucosal Quality good   Mandibular Strength and Mobility intact   Oral Labial Strength and Mobility WFL   Lingual Strength and Mobility WFL   Velar Elevation (not able to visualize)   Buccal Strength and Mobility intact   Laryngeal Function Cough;Swallow;Voicing initiated;Dry swallow palpated   Oral Musculature Comments WFL   Additional Documentation Yes   Additional evaluation(s)  completed today No   Clinical Swallow Eval: Thin Liquid Texture Trial   Mode of Presentation, Thin Liquids cup;self-fed   Volume of Liquid or Food Presented sips x10   Oral Phase of Swallow WFL   Pharyngeal Phase of Swallow feeling of something stuck in throat;reduction in laryngeal movement  (Mildly reduced laryngeal elevation)   Diagnostic Statement Pt noted globus sensation x1. No overt signs of aspiration.   Clinical Swallow Eval: Puree Solid Texture Trial   Mode of Presentation, Puree spoon;self-fed   Volume of Puree Presented teaspoons x3   Oral Phase, Puree WFL   Pharyngeal Phase, Puree intact   Diagnostic Statement No overt signs of aspiration.   Clinical Swallow Eval: Semisolid Texture Trial   Mode of Presentation, Semisolid spoon;self-fed   Volume of Semisolid Food Presented teaspoon x5   Oral Phase, Semisolid other (see comments)  (Prolonged mastication)   Pharyngeal Phase, Semisolid intact   Diagnostic Statement Suspect prolonged mastication due to lack of partials. No overt signs of aspiration.   Clinical Swallow Eval: Solid Food Texture Trial   Mode of Presentation, Solid fed by clinician   Volume of Solid Food Presented Soft solids x5   Oral Phase, Solid other (see comments)  (Prolonged mastication)   Pharyngeal Phase, Solid intact   Diagnostic Statement Suspect prolonged mastication due to lack of partials. No overt signs of aspiration.   Esophageal Phase of Swallow   Patient reports or presents with symptoms of esophageal dysphagia No   Esophageal sweep performed during today s vidofluoroscopic exam  No   General Therapy Interventions   Planned Therapy Interventions Dysphagia Treatment   Dysphagia treatment Modified diet education  (Swallow precautions)   Swallow Eval: Clinical Impressions   Skilled Criteria for Therapy Intervention Skilled criteria met.  Treatment indicated.   Functional Assessment Scale (FAS) 5   Treatment Diagnosis Mild Oral Pharyngeal Dysphagia   Diet texture recommendations  Dysphagia diet level 2;Thin liquids   Recommended Feeding/Eating Techniques alternate between small bites and sips of food/liquid;maintain upright posture during/after eating for 30 mins;small sips/bites   Therapy Frequency daily   Predicted Duration of Therapy Intervention (days/wks) 1 week   Anticipated Discharge Disposition other (see comments)  (TCU)   Risks and Benefits of Treatment have been explained. Yes   Patient, family and/or staff in agreement with Plan of Care Yes   Clinical Impression Comments SLP - Pt seen for bedside swallow evaluation in pm. Pt presents with mild oral pharyngeal dysphagia. Oral motor exam WFL for pt's age and living situation. Presented PO trials of thin liquids x10, puree x3, semisolid x5, and soft solid x5. Pt reported globus sensation x1 with thin liquids but did not report this for any other textures. Pt's partials are currently at his LTC; suspect lack of partials contributes to prolonged mastication on semisolids and soft solids. Mild reduction in laryngeal elevation exhibited for all textures. Pt reported soft solids felt scratchy on his throat; suspect pharyngeal residue. Pt exhibited coughing x2 not directly linked to swallow during conversation. Pt educated regarding dysphagia diet level 2 and swallow precautions. Plan to ensure diet tolerance and upgrade diet as able. Recommend dysphagia level diet 2 with thin liquids and the following precautions: upright at 90 degrees, small sips/bites, and slow rate. Discharge to TCU when stable with continued SLP swallow tx.   Total Evaluation Time   Total Evaluation Time (Minutes) 25[AH1.1]        Revision History        User Key Date/Time User Provider Type Action    > AH1.1 3/18/2017  5:34 PM Niki Chaidez, SLP Speech Language Sign            Progress Notes by Ce Patrick MD at 3/18/2017  3:53 PM     Author:  Ce Patrick MD Service:  Hospitalist Author Type:  Physician    Filed:  3/18/2017  4:07 PM Date of Service:   3/18/2017  3:53 PM Note Created:  3/18/2017  3:53 PM    Status:  Signed :  Ce Patrick MD (Physician)         [facility]  Hospitalist Progress Note    Ce Patrick MD  03/18/2017    Assessment & Plan      Mason Gonzalez is a 89 year old male with past medical history of atrial fibrillation on chronic anticoagulation, hypothyroidism and diet-controlled type 2 diabetes who presented from outside hospital for evaluation of an extraperitoneal hemorrhage and was admitted on 3/7/17.     Spontaneous extraperitoneal hemorrhage 2/2 coagulopathy in the context of warfarin use:   CT scan at outside facility showed an 8.4 cm extraperitoneal hemorrhage in the right pelvis surrounding the bladder.  Hemoglobin was stable at 13.6; however, INR was elevated at 5.8.  S/p vitamin K at [facility].   Repeat CT abd/pelvis on admission showed increased size of extraperitoneal hematoma along the anterior aspect of the bladder which was measured 11.6 cm.   -  Received Kcentra, vitamin K and FFP upon admission  -  Hgb remained stable at 11-12 grams.  -  Seen by general surg and rec was non-surg management.  Warfarin reintroduced on 3/9 but has been on hold since 3/13.  -  Pain controlled with PTA tramadol.    Acute blood loss anemia:   Secondary to above.  Hgb  was13 at outside hospital, down to 11-12 grams her but remained stable.  Monitor CBC.    Atrial fibrillation with RVR:   Has a h/o afib, RVR was probably due to missed metoprolol dose and acute abd pain.  PTA Metoprolol dose increased and digoxin added.  HR better this am.  Continue Metoprolol 100 mg BID and digoxin 0.125 mg daily.  Check dig level in am.  INR was reversed as above and reintroduced on 3/9.  INR jumped from 1.75 ---> 2.28 --> coumadin held ---> 2.62 ---> 2.84 ---> 2.63 ---> 2.01 today.  Coumadin has been on hold since 3/13.  LFTs wnl except for elevated total bili.  Elevated INR may be due to poor oral intake.  Coumadin dosing per  PharmD.    Elevated Total Bili:  ? Hemolysis.  Check peripheral smear discovered macrocytic anemia (folate or B12 deficiency or previous etoh abuse).  Retic count and LDH are elevated.  Haptoglobin level pending.  Abd US on 3/17/17 discovered coarse increased echogenicity of the liver, which is nonspecific.  Check Folate and B12 level.    Hypothyroidism:   Continue prior to admission Synthroid.     Diet controlled DM-II:   Well controlled w/ diet.  Monitor.      Acute on chronic systolic CHF:   Has a h/o CHF per [facility] record.  Echo on 3/10/17, this admissions showed EF of 50-55% and mod to severe TR.  Rhythm was afib during TTE.  I/O neg 213 since admi.  Wt is up by 2 lbs since admit.  Lasix has been on hold due to worsening renal failure.  Pt has intractable cough, ? Decompensated CHF.  Started lasix 20 mg iv bid on 3/17.        ARF:   Likely pre-renal.  Baseline cr unknown.  Admit cr was 1.2 ---> ---> 1.28 ---> 1.24 ---> 1.21 ---> 1.21 again today.  Reintroduced Lasix 20 mg iv bid on 3/17.  Avoid other Nephrotoxins.      Hypokalemia:   On replacement protocol.    Suspected HCAP/atypical PNA:   Persistent severe wheezing, dyspnea and dry cough.    -  Repeat CXR 3/11 showed coarse Lt lung infiltrates worse than prior.  -  Blood cultures x2 drawn 3/11 and started on Zosyn and azithromycin, Azithromycin d/aiyana on 3/13 because of it's interaction w/ coumadin.    -  Received short course of steroid.  -  Continue to have intractable non-productive cough.  Not on ACE I.  -  CT chest w/o contrast on 3/16 discovered patchy groundglass and interstitial opacities, greater on the left which could be due to pneumonia or edema.  Small right pleural effusion.  Mildly aneurysmal thoracic aorta measuring 4 cm in the ascending aorta and 4.3 cm in the upper descending thoracic aorta.  There is also small rind of soft tissue density adjacent to the upper descending thoracic aorta of uncertain significance  -  Continue acapella valve  treatment, schedule Tessalon tid and prn mucomyst neb.  Started Lasix 20 mg iv bid and Claritin 10 mg po daily on 3/17.  He has been on Protonix since admit.    -  Seen by pulm consult seRvice on 3/17.  Solu medrol 40 mg iv q8 hours added.   -  SLP consulted.  On DD2     Obstructive sleep apnea:   Does not use CPAP.     Influenza prophylaxis:  He was initiated on Tamiflu prophylaxis at long-term [facility] facility for 10 days total, started 3/6. Complete course.     Physical deconditioning:   - he is very weak at this time.   - PT eval noted.  Plan is to dc back to LTC    Deep venous thrombosis prophylaxis:   Warfarin      CODE STATUS:  Full    Dispo:   Anticipate d/c in 1-2 days if cough is better.    Interval History     cough is better today.  He feels tired.  No other complaints.    -Data reviewed today: I reviewed all new labs and imaging over the last 24 hours.     Physical Exam   Heart Rate: 85, Blood pressure 104/57, pulse 65, temperature 98  F (36.7  C), resp. rate 18, height 1.829 m (6'), weight 88.4 kg (194 lb 14.2 oz), SpO2 91 %.  Vitals:    03/15/17 0455 03/16/17 0500 03/17/17 0440   Weight: 92.8 kg (204 lb 9.4 oz) 88.5 kg (195 lb 1.7 oz) 88.4 kg (194 lb 14.2 oz)     Vital Signs with Ranges  Temp:  [97.3  F (36.3  C)-98.1  F (36.7  C)] 98  F (36.7  C)  Pulse:  [] 65  Heart Rate:  [] 85  Resp:  [18-24] 18  BP: (104-141)/(57-92) 104/57  SpO2:  [90 %-95 %] 91 %  I/O's Last 24 hours  I/O last 3 completed shifts:  In: 1630 [P.O.:1530; I.V.:100]  Out: 1225 [Urine:1225]    Constitutional: Alert, awake, and no apparent distress receiving neb now  HEENT: PERRLA, EOMI, mucosa dry now  Respiratory: Coarse b/l but no wheezing now. Not in distress. On room air.  Cardiovascular: irregular, no m/r/g  Abdomen: soft, non-distended, non tenderness, ecchymosis Rt lower abd to scrotum stable  Skin/Integumen:  Ecchymosis  right lower abdomen/scrotum stable, non tender.      Medications   All medications I am  responsible for were reviewed.       Warfarin Therapy Reminder         warfarin  1 mg Oral ONCE at 18:00     furosemide  20 mg Intravenous BID     loratadine  10 mg Oral Daily     benzonatate  200 mg Oral TID     methylPREDNISolone  40 mg Intravenous Q8H     sodium chloride (PF)  10 mL Intracatheter Q8H     piperacillin-tazobactam  4.5 g Intravenous Q6H     mirtazapine  15 mg Oral At Bedtime     digoxin  125 mcg Oral Daily     pantoprazole  40 mg Oral QAM     ipratropium - albuterol 0.5 mg/2.5 mg/3 mL  3 mL Nebulization 4x daily     metoprolol  100 mg Oral BID     sodium chloride (PF)  3 mL Intracatheter Q8H     levothyroxine (SYNTHROID/LEVOTHROID) tablet 200 mcg  200 mcg Oral Daily     sennosides  1 tablet Oral At Bedtime        Data     Recent Labs  Lab 03/18/17  0615 03/17/17  0743 03/16/17  0744 03/15/17  0715  03/13/17  1148 03/12/17  0736   WBC  --  8.3  --  7.8  --   --  5.1   HGB  --  12.2*  --  11.0*  --  10.8* 10.9*   MCV  --  103*  --  101*  --   --  101*   PLT  --  266  --  273  --   --  300   INR 2.01* 2.63* 2.84* 2.82*  < > 2.28* 1.75*    138  --  136  --  135 137   POTASSIUM 4.4 4.4  --  4.1  --  3.8 4.1   CHLORIDE 99 101  --  101  --  97 100   CO2 28 27  --  25  --  29 29   BUN 35* 33*  --  40*  --  36* 27   CR 1.21 1.21  --  1.24  --  1.28* 1.19   ANIONGAP 8 10  --  10  --  9 8   SARAH 8.5 8.8  --  8.5  --  8.4* 8.1*   * 97  --  164*  --  186* 218*   ALBUMIN  --  2.7*  --   --   --   --   --    PROTTOTAL  --  6.7*  --   --   --   --   --    BILITOTAL  --  4.7*  --   --   --   --   --    ALKPHOS  --  81  --   --   --   --   --    ALT  --  23  --   --   --   --   --    AST  --  42  --   --   --   --   --    < > = values in this interval not displayed.    Recent Results (from the past 24 hour(s))   US Abdomen Complete    Narrative    ULTRASOUND ABDOMEN COMPLETE  3/17/2017  8:32 PM     HISTORY: Elevated total bilirubin.    COMPARISON: None.    FINDINGS: Liver is coarse and increased in  echogenicity without focal  solid lesions. Gallbladder is not well seen due to overlying  shadowing, no definite gallbladder wall thickening or cholelithiasis  demonstrated. Extrahepatic bile duct is not seen. Pancreas is  completely obscured. Spleen is normal. Kidneys are normal in size.  There is no hydronephrosis. Tiny left renal cyst. Visualized abdominal  aorta and IVC are nonaneurysmal.      Impression    IMPRESSION: Limited exam, coarse increased echogenicity of the liver  which is nonspecific.    GORDON AMS MD[AO1.1]        Revision History        User Key Date/Time User Provider Type Action    > AO1.1 3/18/2017  4:07 PM Ce Patrick MD Physician Sign            Progress Notes by Ce Patrick MD at 3/17/2017 11:24 AM     Author:  Ce Patrick MD Service:  Hospitalist Author Type:  Physician    Filed:  3/17/2017  8:25 PM Date of Service:  3/17/2017 11:24 AM Note Created:  3/17/2017 11:24 AM    Status:  Addendum :  Ce Patrick MD (Physician)         [facility]  Hospitalist Progress Note    Ce Patrick MD  03/17/2017    Assessment & Plan      Mason Gonzalez is a 89 year old male with past medical history of atrial fibrillation on chronic anticoagulation, hypothyroidism and diet-controlled type 2 diabetes who presented from outside hospital for evaluation of an extraperitoneal hemorrhage and was admitted on 3/7/17.     Spontaneous extraperitoneal hemorrhage 2/2 coagulopathy in the context of warfarin use:   CT scan at outside facility showed an 8.4 cm extraperitoneal hemorrhage in the right pelvis surrounding the bladder.  Hemoglobin was stable at 13.6; however, INR was elevated at 5.8.  S/p vitamin K at [facility].   Repeat CT abd/pelvis on admission showed increased size of extraperitoneal hematoma along the anterior aspect of the bladder which was measured 11.6 cm.   -  Received Kcentra, vitamin K and FFP upon admission  -  Hgb remained stable at 11-12 grams.  -   Seen by general surg and rec was non-surg management.  Warfarin reintroduced on 3/9 but has been on hold since 3/13.  -  Pain controlled with PTA tramadol.    Acute blood loss anemia:   Secondary to above.  Hgb  was13 at outside hospital, down to 11-12 grams her but remained stable.  Monitor CBC.    Atrial fibrillation with RVR:   Has a h/o afib, RVR was probably due to missed metoprolol dose and acute abd pain.  PTA Metoprolol dose increased and digoxin added.  HR better this am.  Continue Metoprolol 100 mg BID and digoxin 0.125 mg daily.  Check dig level in am.  INR was reversed as above and reintroduced on 3/9.  INR jumped from 1.75 ---> 2.28 --> coumadin held ---> 2.62 ---> 2.84 ---> 2.63 today.  Coumadin has been on hold since 3/13.  LFTs wnl except for elevated total bili.  Elevated INR may be due to poor intake.  Continue holding off coumadin.    Elevated Total Bili:  ? Hemolysis.  Check peripheral smear, retic count, LDH and haptoglobin.  Will also obtain abd US to rule out cholestasis.      Hypothyroidism:   Continue prior to admission Synthroid.     Diet controlled DM-II:   Well controlled w/ diet.  Monitor.[AO1.1]      Acute on c[AO1.2]hronic systolic CHF:   Has a h/o CHF per [facility] record.  Echo on 3/10/17, this admissions showed EF of 50-55% and mod to severe TR.  Rhythm was afib during TTE.  I/O neg 213 since admi.  Wt is up by 2 lbs since admit.  Lasix has been on hold due to worsening renal failure.  Pt has intractable cough, ? Decompensated CHF.  Start lasix 20 mg iv bid.        ARF:   Likely pre-renal.  Baseline cr unknown.  Admit cr was 1.2 ---> ---> 1.28 ---> 1.24 ---> 1.21 today.  Reintroduce Lasix 20 mg iv bid.  Avoid other Nephrotoxins.      Hypokalemia:   On replacement protocol.    Suspected HCAP/atypical PNA:   Persistent severe wheezing, dyspnea and dry cough.    -  Repeat CXR 3/11 showed coarse Lt lung infiltrates worse than prior.  -  Blood cultures x2 drawn 3/11 and started on Zosyn and  azithromycin, Azithromycin d/aiyana on 3/13 because of it's interaction w/ coumadin.    -  Received short course of steroid.  -  Continue to have intractable non-productive cough.  Not on ACE I.  -  CT chest w/o contrast on 3/16 discovered patchy groundglass and interstitial opacities, greater on the left which could be due to pneumonia or edema.  Small right pleural effusion.  Mildly aneurysmal thoracic aorta measuring 4 cm in the ascending aorta and 4.3 cm in the upper descending thoracic aorta.  There is also small rind of soft tissue density adjacent to the upper descending thoracic aorta of uncertain significance  -  Continue acapella valve treatment, schedule Tessalon tid and prn mucomyst neb.  He received Lidocaine neb twice.  Start Lasix 20 mg iv bid.  He is also on protonix.  Add Claritin 10 mg po daily.  SLP consult requested.  -  Will consult w/ pulm service as well.    Obstructive sleep apnea:   Does not use CPAP.     Influenza prophylaxis:  He was initiated on Tamiflu prophylaxis at long-term [facility] facility for 10 days total, started 3/6. Complete course.     Physical deconditioning:   - he is very weak at this time.   - PT eval noted.  Plan is to dc back to LTC    Deep venous thrombosis prophylaxis:   Warfarin      CODE STATUS:  Full    Dispo:   Anticipate d/c in 1-2 days if cough is better.    Interval History      C/o intractable non-productive cough.  Did not sleep well last night.    -Data reviewed today: I reviewed all new labs and imaging over the last 24 hours.     Physical Exam   Heart Rate: 91, Blood pressure 92/54, pulse 99, temperature 97.9  F (36.6  C), temperature source Oral, resp. rate 24, height 1.829 m (6'), weight 88.4 kg (194 lb 14.2 oz), SpO2 93 %.  Vitals:    03/15/17 0455 03/16/17 0500 03/17/17 0440   Weight: 92.8 kg (204 lb 9.4 oz) 88.5 kg (195 lb 1.7 oz) 88.4 kg (194 lb 14.2 oz)     Vital Signs with Ranges  Temp:  [97.2  F (36.2  C)-98  F (36.7  C)] 97.9  F (36.6  C)  Pulse:   [] 99  Heart Rate:  [91] 91  Resp:  [18-30] 24  BP: ()/(54-82) 92/54  SpO2:  [91 %-98 %] 93 %  I/O's Last 24 hours  I/O last 3 completed shifts:  In: 710 [P.O.:710]  Out: 125 [Urine:125]    Constitutional: Alert, awake, and no apparent distress receiving neb now  HEENT: PERRLA, EOMI, mucosa dry now  Respiratory: Coarse b/l but no wheezing now. Not in distress. On room air.  Cardiovascular: irregular, no m/r/g  Abdomen: soft, non-distended, non tenderness, ecchymosis Rt lower abd to scrotum stable  Skin/Integumen:  Ecchymosis  right lower abdomen/scrotum stable, non tender.      Medications   All medications I am responsible for were reviewed.       Warfarin Therapy Reminder         furosemide  20 mg Intravenous BID     piperacillin-tazobactam  4.5 g Intravenous Q6H     lidocaine inhalant  3 mL Nebulization Once     benzonatate  100 mg Oral TID     acetylcysteine  2 mL Nebulization Q4H     mirtazapine  15 mg Oral At Bedtime     digoxin  125 mcg Oral Daily     pantoprazole  40 mg Oral QAM     ipratropium - albuterol 0.5 mg/2.5 mg/3 mL  3 mL Nebulization 4x daily     metoprolol  100 mg Oral BID     sodium chloride (PF)  3 mL Intracatheter Q8H     levothyroxine (SYNTHROID/LEVOTHROID) tablet 200 mcg  200 mcg Oral Daily     sennosides  1 tablet Oral At Bedtime        Data     Recent Labs  Lab 03/17/17  0743 03/16/17  0744 03/15/17  0715  03/13/17  1148 03/12/17  0736   WBC 8.3  --  7.8  --   --  5.1   HGB 12.2*  --  11.0*  --  10.8* 10.9*   *  --  101*  --   --  101*     --  273  --   --  300   INR 2.63* 2.84* 2.82*  < > 2.28* 1.75*     --  136  --  135 137   POTASSIUM 4.4  --  4.1  --  3.8 4.1   CHLORIDE 101  --  101  --  97 100   CO2 27  --  25  --  29 29   BUN 33*  --  40*  --  36* 27   CR 1.21  --  1.24  --  1.28* 1.19   ANIONGAP 10  --  10  --  9 8   SARAH 8.8  --  8.5  --  8.4* 8.1*   GLC 97  --  164*  --  186* 218*   ALBUMIN 2.7*  --   --   --   --   --    PROTTOTAL 6.7*  --   --   --    --   --    BILITOTAL 4.7*  --   --   --   --   --    ALKPHOS 81  --   --   --   --   --    ALT 23  --   --   --   --   --    AST 42  --   --   --   --   --    < > = values in this interval not displayed.    Recent Results (from the past 24 hour(s))   CT Chest w/o Contrast    Narrative    CT CHEST WITHOUT CONTRAST  3/16/2017 4:33 PM     HISTORY: Intractable non-productive cough.    TECHNIQUE: Volumetric acquisition of the chest  without contrast. .  Radiation dose for this scan was reduced using automated exposure  control, adjustment of the mA and/or kV according to patient size, or  iterative reconstruction technique.    COMPARISON: None.    FINDINGS: Patchy bilateral areas of groundglass and interstitial  infiltrate, greater on the left. 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 Small right pleural  effusion. Borderline cardiomegaly. Extensive coronary artery  calcifications. There are a few small, nonspecific mediastinal lymph  nodes. Atheromatous calcification of the thoracic aorta. The thoracic  aorta is mildly aneurysmal measuring 4 cm in the ascending aorta and  4.3 cm in diameter in the upper descending thoracic aorta. Adjacent to  the upper descending thoracic aorta there is a small rind of density  measuring approximately 2.8 x 1.4 x 6.3 cm in AP, transverse and  craniocaudad dimensions. (Axial image 23 and coronal image 45). This  is of uncertain etiology and significance and may be chronic. It could  be minimal loculated fluid though it measures higher density than  simple fluid. It could also be a thin rind of soft tissue density of  uncertain significance. It is conceivable that it could arise from the  aorta such as a contained leak though that is felt to be less likely.  Correlate clinically. Advanced degenerative changes in the thoracic  spine. Hemangioma in a lower thoracic vertebral body.       Impression    IMPRESSION:  1. Patchy groundglass and interstitial opacities, greater on the left  which could be due to pneumonia or edema. Small right pleural  effusion. Correlate clinically to exclude CHF.  2. Mild thyromegaly. Extensive coronary artery calcifications.  3. Mildly aneurysmal thoracic aorta measuring 4 cm in the ascending  aorta and 4.3 cm in the upper descending thoracic aorta.  4. Small rind of soft tissue density adjacent to the upper descending  thoracic aorta of uncertain significance. While it could be some  loculated fluid is not the density of simple fluid. This could be  complex fluid or soft tissue or scarring. A contained leak from the  thoracic aorta could not be completely excluded. This is felt to most  likely not be an acute finding. However clinical correlation  recommended. Consider follow-up imaging with contrast or comparison  with old studies.    Findings discussed with Dr. Patrick at 5:50 PM    MUNIR SAINI MD[AO1.1]        Revision History        User Key Date/Time User Provider Type Action    > AO1.2 3/17/2017  8:25 PM Ce Patrick MD Physician Addend     AO1.1 3/17/2017 11:50 AM Ce Patrick MD Physician Sign            Progress Notes by Rica Malave LICSW at 3/17/2017  3:06 PM     Author:  Rica Malave LICSW Service:  Social Work Author Type:      Filed:  3/17/2017  3:08 PM Date of Service:  3/17/2017  3:06 PM Note Created:  3/17/2017  3:06 PM    Status:  Signed :  Rica Malave LICSW ()         SW:     I: BASSAM following for discharge planning. BASSAM spoke with admissions at [facility] ([facility]. BASSAM updated that pt will likely be ready for discharge this weekend. SW to contact main number at facility ([phone]) and fax orders to [phone].    P: BASSAM to follow.    MIRZA Bah LICSW  Daytime (8:00am-4:30pm): [phone]  After-Hours BASSAM Pager (4:30pm-11:30pm): [phone][CL1.1]        Revision  History        User Key Date/Time User Provider Type Action    > CL1.1 3/17/2017  3:08 PM Rica Malave, DOMONIQUE  Sign            Progress Notes by Kian Ramsey MD at 3/17/2017 12:08 PM     Author:  Kian Ramsey MD Service:  (none) Author Type:  Physician    Filed:  3/17/2017 12:23 PM Date of Service:  3/17/2017 12:08 PM Note Created:  3/17/2017 12:08 PM    Status:  Signed :  Kian Ramsey MD (Physician)         PULMONARY CONSULT NOTE    Full consult dictated.[MK1.1]    Active Problems:    Intraabdominal hemorrhage[MK1.2]           Assessment and Plan:      89 yo male with multiple medical problems admitted with spontaneous extraperitoneal hemorrhage secondary to coagulopathy due to Coumadin. Consulted re: cough. Hospital course complicated by suspected HCAP. CT Chest 3/16 showed patchy bilateral areas of groundglass and interstitial infiltrate L>R. Patient's care has been compromised by his refusal of medications and therapy. RN raises concern for possible aspiration. DDx cough includes pneumonia, aspiration, reflux and sinus drainage. Recommendations as outlined below. Respiratory status currently stable on room air.    Diagnoses  Abnl CT/CXR  R91.8  Cough   R05  Pneum aspiration J69.0    Plan:  1. Bronchodilators - continue DuoNebs, D/C MucoMyst (could be irritating to the airways)  2. Antibiotics - Zosyn.  3. Steroids - start SoluMedrol.  4. Diuresis - Lasix as ordered.  5. Antitussives - increase Tessalon Perles dose to 200 mg TID.  6. D/C Lidocaine nebs (increases risk for aspiration).  7. PPI - Protonix.  8. Aspiration precautions.  9. Follow CXR.    Thanks, will follow.      Kian Ramsey MD    [facility] / [facility]  [phone] (pager)  [phone] (office)[MK1.1]     Revision History        User Key Date/Time User Provider Type Action    > MK1.2 3/17/2017 12:23 PM Kian Ramsey MD Physician Sign     MK1.1 3/17/2017 12:08 PM Roxy  Kian LOPEZ MD Physician             Progress Notes by Andrzej Beltran RT at 3/16/2017  6:08 PM     Author:  Andrzej Beltran RT Service:  Respiratory Therapy Author Type:  Respiratory Therapist    Filed:  3/16/2017  6:11 PM Date of Service:  3/16/2017  6:08 PM Note Created:  3/16/2017  6:08 PM    Status:  Signed :  Andzrej Beltran RT (Respiratory Therapist)         Pt on 3 lpm NC, SpO2 97%, BBS diminished, all the neb tx were given as ordered, will continue to monitor the pt.     RT Dilshad.[YJ1.1]      Revision History        User Key Date/Time User Provider Type Action    > YJ1.1 3/16/2017  6:11 PM Andrzej Beltran RT Respiratory Therapist Sign            Progress Notes by Rica Malave LICSW at 3/16/2017  3:32 PM     Author:  Rica Malave LICSW Service:  Social Work Author Type:      Filed:  3/16/2017  3:44 PM Date of Service:  3/16/2017  3:32 PM Note Created:  3/16/2017  3:32 PM    Status:  Signed :  Rica Malave LICSW ()         Care Transition Initial Assessment -   Reason For Consult: discharge planning  Met with: Patient[CL1.1]    Active Problems:    Intraabdominal hemorrhage[CL1.2]         DATA  Lives With: facility resident  Living Arrangements: extended care facility  Description of Support System: Supportive  Who is your support system?: Children  Support Assessment: Adequate social supports, Adequate family and caregiver support.   Identified issues/concerns regarding health management: [facility] at [facility].  Patient feels that they have adequate support @ home?  Yes  Quality Of Family Relationships: supportive         ASSESSMENT  Cognitive Status:  awake and alert      SW has reviewed pt records. Pt is Mason, a 89yo gentleman who was admitted on 03/07/2017 due to intraabdominal hemorrhage. Pt resides in long-term care at [facility]. Pt has a bed hold and  will return at discharge. Pt did require a one to one due to fear of being along, did not display behaviors or impulsivity. BASSAM has faxed clinical updates to [phone]. BASSAM will continue to update facility and coordinate return when appropriate. If pt continues to require oxygen, anticipate stretcher transport will be need as pt unable to safely monitor this independently.     PLAN  Financial costs for the patient includes: unknown at this time.  Patient given options and choices for discharge N/A, returning to LTC.  Patient/family is agreeable to the plan?  Yes  Patient Goals and Preferences: LTC.    MIRZA Bah LICSW  Daytime (8:00am-4:30pm): [phone]  After-Hours  Pager (4:30pm-11:30pm): [phone][CL1.1]            Revision History        User Key Date/Time User Provider Type Action    > CL1.2 3/16/2017  3:44 PM Rica Malave LICSW  Sign     CL1.1 3/16/2017  3:32 PM Rica Malave LICSW              Progress Notes by Ce Patrick MD at 3/16/2017  2:39 PM     Author:  Ce Patrick MD Service:  Hospitalist Author Type:  Physician    Filed:  3/16/2017  2:39 PM Date of Service:  3/16/2017  2:39 PM Note Created:  3/16/2017  2:39 PM    Status:  Signed :  Ce Patrick MD (Physician)         [facility]  Hospitalist Progress Note    Ce Patrick MD  03/16/2017    Assessment & Plan      Mason Gonzalez is a 89 year old male with past medical history of atrial fibrillation on chronic anticoagulation, hypothyroidism and diet-controlled type 2 diabetes who presented from outside hospital for evaluation of an extraperitoneal hemorrhage and was admitted on 3/7/17.     Extraperitoneal hemorrhage, spontaneous bleed secondary to coagulopathy in the context of warfarin use.    The patient had a CT scan at outside facility which showed an 8.4 cm extraperitoneal hemorrhage in the right pelvis surrounding the bladder. Hemoglobin was stable at 13.6;  however, INR was elevated at 5.8. S/p vitamin K at [facility].   Repeat CT abd/pelvis on admission showed increased size of extraperitoneal hematoma along the anterior aspect of the bladder which was measuring 11.6 cm.   - received Kcentra, vitamin K and FFP upon admission  - Hgb remained stable at 11 grams.  - Seen by general surg consult and rec is non-surg management.  Warfarin reintroduced on 3/9 but has been on hold since 3/13.  - Pain controlled with PTA tramadol.    Acute blood loss anemia:  - secondary to above  - hgb 13 at outside hospital, down to 11grams and has remained stable.  - check CBC in am    Atrial fibrillation with RVR  This is likely secondary to missed metoprolol dose as well as worsened due to acute pain.  Remains in RVR despite being placed back on his PTA meds  -  INR was reversed as above, now back on warfarin.  -  INR jumped from 1.75 ---> 2.28 --> coumadin held ---> 2.62 ---> 2.84 today.  Coumadin has been on hold past 4 days.    -  HR is better today w/ increased dose of Metoprolol 100 mg BID, and with the addition of digoxin.  Check LFTs and dig levels tomorrow.   -  Prn IV Metoprolol also available if HR >120    Hypothyroidism.  Continue prior to admission Synthroid.     Diet controlled DM-II:  - was not using ISS so discontinued., BS controlled.    Congestive heart failure, unspecified. The patient has [facility] No prior echocardiograms available.   - off fluids sicne 3/8. CXR from 3/8 showed some interstitial changes, and weight also noted up to 91 kg (87 on admission), receiving intermittent lasix, persistent wheezing and lungs very coarse.  - add scheduled duoneb    - Received lasix which is currently on hold due to worsening renal function  - follow I&O and daily weight. incontinnent of urine thus difficult to measure I/O  - ECHO done this admission, normal LVEF.    ARF:  Likely pre-renal.  Baseline cr unknown.  Admit cr was  1.2 ---> ---> 1.28 ---> 1.24. Avoid Nephrotoxins.  Lasix d/aiyana.    Hypokalemia: On replacement protocol.  - normal now.    Suspected HCAP/atypical PNA: Persistent severe wheezing, dyspnea and dry cough.  - Repeat CXR 3/11 showed coarse Lt lung infiltrates worse than prior.  - blood cultures x2 drawn 3/11 and started on Zosyn and azithromycin, Azithromycin d/aiyana on 3/13 because of it's interaction w/ coumadin.  Will change Zosyn to augmentin at [place].  - Received short course of steroid.  - Now with persistent non-productive cough.  Not on ACE I.  - will obtain CT chest w/o contrast .  - continue acapella valve treatment.  schedule Tessalon tid.  Lidocaine neb x one again today. Add mucomyst neb.  - On protonix already.    Obstructive sleep apnea:   Does not use CPAP.     Influenza prophylaxis. The patient was initiated on Tamiflu prophylaxis at long-term [facility] facility for 10 days total, started 3/6. Complete course.     Physical deconditioning:   - he is very weak at this time.   - PT eval noted.  Plan is to dc back to LTC    Deep venous thrombosis prophylaxis: - PCDs/warfarin      CODE STATUS: Full    Dispo:   Anticipate d/c in 1-2 days if cough is better.    Interval History      C/o intractable non-productive cough.  No fever or chills.      -Data reviewed today: I reviewed all new labs and imaging over the last 24 hours.     Physical Exam   Heart Rate: 99, Blood pressure 109/69, pulse 88, temperature 97.2  F (36.2  C), temperature source Axillary, resp. rate 18, height 1.829 m (6'), weight 88.5 kg (195 lb 1.7 oz), SpO2 91 %.  Vitals:    03/14/17 0600 03/15/17 0455 03/16/17 0500   Weight: 86.8 kg (191 lb 5.8 oz) 92.8 kg (204 lb 9.4 oz) 88.5 kg (195 lb 1.7 oz)     Vital Signs with Ranges  Temp:  [97.2  F (36.2  C)-97.8  F (36.6  C)] 97.2  F (36.2  C)  Pulse:  [] 88  Heart Rate:  [87-99] 99  Resp:  [18-34] 18  BP: (108-143)/(65-85) 109/69  SpO2:  [91 %-100 %] 91 %  I/O's Last 24 hours  I/O last 3 completed  shifts:  In: 1140 [P.O.:840; I.V.:300]  Out: 1025 [Urine:1025]    Constitutional: Alert, awake, and no apparent distress receiving neb now  HEENT: PERRLA, EOMI, mucosa dry now  Respiratory: Coarse b/l but no wheezing now. Not in distress. On room air.  Cardiovascular: irregular, no tachycardia, trace LE edema bilaterally  Abdomen: soft, non-distended, non tenderness, ecchymosis Rt lower abd to scrotum stable  Skin/Integumen:  Ecchymosis  right lower abdomen/scrotum stable, non tender.      Medications   All medications I am responsible for were reviewed.       Warfarin Therapy Reminder         piperacillin-tazobactam  4.5 g Intravenous Q6H     warfarin-No DOSE today  1 each Does not apply no dose today (warfarin)     lidocaine inhalant  3 mL Nebulization Once     benzonatate  100 mg Oral TID     acetylcysteine  2 mL Nebulization Q4H     mirtazapine  15 mg Oral At Bedtime     digoxin  125 mcg Oral Daily     pantoprazole  40 mg Oral QAM     ipratropium - albuterol 0.5 mg/2.5 mg/3 mL  3 mL Nebulization 4x daily     metoprolol  100 mg Oral BID     sodium chloride (PF)  3 mL Intracatheter Q8H     levothyroxine (SYNTHROID/LEVOTHROID) tablet 200 mcg  200 mcg Oral Daily     sennosides  1 tablet Oral At Bedtime        Data     Recent Labs  Lab 03/16/17  0744 03/15/17  0715 03/14/17  0725 03/13/17  1148 03/12/17  0736  03/11/17  1802   WBC  --  7.8  --   --  5.1  --  8.0   HGB  --  11.0*  --  10.8* 10.9*  --  11.0*   MCV  --  101*  --   --  101*  --  100   PLT  --  273  --   --  300  --  310   INR 2.84* 2.82* 2.62* 2.28* 1.75*  --   --    NA  --  136  --  135 137  --   --    POTASSIUM  --  4.1  --  3.8 4.1  < >  --    CHLORIDE  --  101  --  97 100  --   --    CO2  --  25  --  29 29  --   --    BUN  --  40*  --  36* 27  --   --    CR  --  1.24  --  1.28* 1.19  --   --    ANIONGAP  --  10  --  9 8  --   --    SARAH  --  8.5  --  8.4* 8.1*  --   --    GLC  --  164*  --  186* 218*  --   --    < > = values in this interval not  displayed.    No results found for this or any previous visit (from the past 24 hour(s)).[AO1.1]     Revision History        User Key Date/Time User Provider Type Action    > AO1.1 3/16/2017  2:39 PM Ce Patrick MD Physician Sign            Progress Notes by Ce Patrick MD at 3/16/2017  2:17 PM     Author:  Ce Patrick MD Service:  Hospitalist Author Type:  Physician    Filed:  3/16/2017  2:37 PM Date of Service:  3/16/2017  2:17 PM Note Created:  3/16/2017  2:17 PM    Status:  Signed :  Ce Patrick MD (Physician)         [facility]  Hospitalist Progress Note    Ce Patrick MD  03/16/2017    Assessment & Plan      Mason Gonzalez is a 89 year old male with past medical history of atrial fibrillation on chronic anticoagulation, hypothyroidism and diet-controlled type 2 diabetes who presented from outside hospital for evaluation of an extraperitoneal hemorrhage and was admitted on 3/7/17.     Extraperitoneal hemorrhage, spontaneous bleed secondary to coagulopathy in the context of warfarin use.    The patient had a CT scan at outside facility which showed an 8.4 cm extraperitoneal hemorrhage in the right pelvis surrounding the bladder. Hemoglobin was stable at 13.6; however, INR was elevated at 5.8. S/p vitamin K at [facility].   Repeat CT abd/pelvis on admission showed increased size of extraperitoneal hematoma along the anterior aspect of the bladder which was measuring 11.6 cm.   - received Kcentra, vitamin K and FFP upon admission  - Hgb remained stable at 11 grams.  - Seen by general surg consult and rec is non-surg management.  Warfarin reintroduced on 3/9 but has been on hold since 3/13.  - Pain controlled with PTA tramadol.    Acute blood loss anemia:  - secondary to above  - hgb 13 at outside hospital, down to 11grams and has remained stable.  - check CBC in am    Atrial fibrillation with RVR  This is likely secondary to missed metoprolol dose as well as worsened due  to acute pain.  Remains in RVR despite being placed back on his PTA meds  -  INR was reversed as above, now back on warfarin.  -  INR jumped from 1.75 ---> 2.28 --> coumadin held ---> 2.62 ---> 2.84 today.  Coumadin has been on hold past 4 days.    -  HR is better today w/ increased dose of Metoprolol 100 mg BID, and with the addition of digoxin.  Check LFTs and dig levels tomorrow.   -  Prn IV Metoprolol also available if HR >120    Hypothyroidism.  Continue prior to admission Synthroid.     Diet controlled DM-II:  - was not using ISS so discontinued., BS controlled.    Congestive heart failure, unspecified. The patient has [facility]. No prior echocardiograms available.   - off fluids sicne 3/8. CXR from 3/8 showed some interstitial changes, and weight also noted up to 91 kg (87 on admission), receiving intermittent lasix, persistent wheezing and lungs very coarse.  - add scheduled duoneb    - Received lasix which is currently on hold due to worsening renal function  - follow I&O and daily weight. incontinnent of urine thus difficult to measure I/O  - ECHO done this admission, normal LVEF.    ARF:  Likely pre-renal.  Baseline cr unknown.  Admit cr was 1.2 ---> ---> 1.28 ---> 1.24. Avoid Nephrotoxins.  Lasix d/aiyana.    Hypokalemia: On replacement protocol.  - normal now.    Suspected HCAP/atypical PNA: Persistent severe wheezing, dyspnea and dry cough.  - Repeat CXR 3/11 showed coarse Lt lung infiltrates worse than prior.  - blood cultures x2 drawn 3/11 and started on Zosyn and azithromycin, Azithromycin d/aiyana on 3/13 because of it's interaction w/ coumadin.  Will change Zosyn to augmentin at [place].  - Received short course of steroid.  - Now with persistent non-productive cough.  Not on ACE I.  - will obtain CT chest w/o contrast .  - continue acapella valve treatment.  schedule Tessalon tid.  Lidocaine neb x one again today. Add mucomyst neb.    Obstructive sleep apnea:    Does not use CPAP.     Influenza prophylaxis. The patient was initiated on Tamiflu prophylaxis at [facility] for 10 days total, started 3/6. Complete course.     Physical deconditioning:   - he is very weak at this time.   - PT eval noted.  Plan is to dc back to LTC    Deep venous thrombosis prophylaxis: - PCDs/warfarin      CODE STATUS: Full    Dispo:   Anticipate d/c in 1-2 days if cough is better.    Interval History      C/o intractable non-productive cough.  No fever or chills.      -Data reviewed today: I reviewed all new labs and imaging over the last 24 hours.     Physical Exam   Heart Rate: 99, Blood pressure 109/69, pulse 88, temperature 97.2  F (36.2  C), temperature source Axillary, resp. rate 18, height 1.829 m (6'), weight 88.5 kg (195 lb 1.7 oz), SpO2 91 %.  Vitals:    03/14/17 0600 03/15/17 0455 03/16/17 0500   Weight: 86.8 kg (191 lb 5.8 oz) 92.8 kg (204 lb 9.4 oz) 88.5 kg (195 lb 1.7 oz)     Vital Signs with Ranges  Temp:  [97.2  F (36.2  C)-97.8  F (36.6  C)] 97.2  F (36.2  C)  Pulse:  [] 88  Heart Rate:  [87-99] 99  Resp:  [18-34] 18  BP: (108-143)/(65-85) 109/69  SpO2:  [91 %-100 %] 91 %  I/O's Last 24 hours  I/O last 3 completed shifts:  In: 1140 [P.O.:840; I.V.:300]  Out: 1025 [Urine:1025]    Constitutional: Alert, awake, and no apparent distress receiving neb now  HEENT: PERRLA, EOMI, mucosa dry now  Respiratory: Coarse b/l but no wheezing now. Not in distress. On room air.  Cardiovascular: irregular, no tachycardia, trace LE edema bilaterally  Abdomen: soft, non-distended, non tenderness, ecchymosis Rt lower abd to scrotum stable  Skin/Integumen:  Ecchymosis  right lower abdomen/scrotum stable, non tender.      Medications   All medications I am responsible for were reviewed.       Warfarin Therapy Reminder         piperacillin-tazobactam  4.5 g Intravenous Q6H     warfarin-No DOSE today  1 each Does not apply no dose today (warfarin)     mirtazapine  15 mg Oral At Bedtime      digoxin  125 mcg Oral Daily     pantoprazole  40 mg Oral QAM     ipratropium - albuterol 0.5 mg/2.5 mg/3 mL  3 mL Nebulization 4x daily     metoprolol  100 mg Oral BID     sodium chloride (PF)  3 mL Intracatheter Q8H     levothyroxine (SYNTHROID/LEVOTHROID) tablet 200 mcg  200 mcg Oral Daily     sennosides  1 tablet Oral At Bedtime        Data     Recent Labs  Lab 03/16/17  0744 03/15/17  0715 03/14/17  0725 03/13/17  1148 03/12/17  0736  03/11/17  1802   WBC  --  7.8  --   --  5.1  --  8.0   HGB  --  11.0*  --  10.8* 10.9*  --  11.0*   MCV  --  101*  --   --  101*  --  100   PLT  --  273  --   --  300  --  310   INR 2.84* 2.82* 2.62* 2.28* 1.75*  --   --    NA  --  136  --  135 137  --   --    POTASSIUM  --  4.1  --  3.8 4.1  < >  --    CHLORIDE  --  101  --  97 100  --   --    CO2  --  25  --  29 29  --   --    BUN  --  40*  --  36* 27  --   --    CR  --  1.24  --  1.28* 1.19  --   --    ANIONGAP  --  10  --  9 8  --   --    SARAH  --  8.5  --  8.4* 8.1*  --   --    GLC  --  164*  --  186* 218*  --   --    < > = values in this interval not displayed.    No results found for this or any previous visit (from the past 24 hour(s)).[AO1.1]     Revision History        User Key Date/Time User Provider Type Action    > AO1.1 3/16/2017  2:37 PM Ce Patrick MD Physician Sign            Progress Notes by Svitlana Ortiz, RN at 3/16/2017 11:41 AM     Author:  Svitlana Ortiz, RN Service:  [facility] Nurse Author Type:  Registered Nurse    Filed:  3/16/2017 11:44 AM Date of Service:  3/16/2017 11:41 AM Note Created:  3/16/2017 11:41 AM    Status:  Signed :  Svitlana Ortiz, RN (Registered Nurse)         [facility] Nurse Inpatient Adult Pressure Injury (PI) Assessment     Follow-up Assessment of PI(s) on pt's: Rt coccyx/fleshy  buttock area - community acquired    Data:   Patient History:     Mason Gonzalez is a 89 year old male with past medical history of atrial fibrillation on  chronic anticoagulation, hypothyroidism and diet-controlled type 2 diabetes who presented from outside hospital for evaluation of an extraperitoneal hemorrhage.         Current mattress:  AtmosAir  Current pressure relieving devices:  Pillows    Moisture Management:  Incontinence Protocol      Current Diet / Nutrition:     Active Diet Order      Combination Diet 2319-5671 Calories: Moderate Consistent CHO (4-6 CHO units/meal); 2 gm NA Diet      [place] Assessment and sub scores:   Puma Score  Av.4  Min: 14  Max: 18     Labs:   Recent Labs   Lab Test  17   0744  03/15/17   0715   17   0600   HGB   --   11.0*   < >  11.0*   INR  2.84*  2.82*   < >  1.17*   WBC   --   7.8   < >  7.7   A1C   --    --    --   5.7    < > = values in this interval not displayed.                                                                                                                        Pressure Injury Assessment  (location #1): Rt coccyx/inner fleshy buttocks  Wound Base: 2.0cm x 2.0cm x scattered scabbed area with blanchable erythema.  Scabs whitish, superficial and flaking now.      Tunneling:  N/A    Undermining: N/A    Palpation of the wound bed:  normal    Slough appearance:  none    Eschar appearance:  none    Periwound Skin: intact    Temperature  normal     Drainage:   Amount: none     Odor: none    Pain:  minimal          Intervention:     Patient's chart evaluated.      Puma Interventions:  Current Puma Interventions and Care Plan reviewed and updated, appropriate at this time.    Wound was assessed.    Wound Care: was done: replaced Mepilex    Orders  In Epic    Supplies  In floor supply room    Discussed plan of care with Nursing           Assessment:    Small area of unstageable PI, likely Stage 2, with improved surrounding blanchable erythema.  Community acquired.  Nearly resolved. No local s/s infection         Plan:     Nursing to notify the Provider(s) and re-consult the WO Nurse if  "wound(s) deteriorate(s)or if the wound care plan needs reevaluation.    If pt is refusing to turn or reposition they must be educated on the  potential injury from not off loading pressure.  Then this \"educated refusal\" needs to be documented as an \"educated refusal to turn/ reposition\" and document if alert, etc.    Rt coccyx/fleshy  buttock area PI:     -change dressing on odd days and prn     -Mepilex border    WOC Nurse will return: weekly and prn  Face to face time: 15 minutes[AC1.1]       Revision History        User Key Date/Time User Provider Type Action    > AC1.1 3/16/2017 11:44 AM Svitlana Ortiz, RN Registered Nurse Sign            Progress Notes by Naz Roca RT at 3/16/2017  7:04 AM     Author:  Naz Roca RT Service:  (none) Author Type:  Respiratory Therapist    Filed:  3/16/2017  7:05 AM Date of Service:  3/16/2017  7:04 AM Note Created:  3/16/2017  7:04 AM    Status:  Signed :  Naz Roca RT (Respiratory Therapist)         Patient is on 1L nasal cannula with SpO2 94%, BBS expiratory wheezes, Nebs tx is given.[SA1.1]    Naz Roca[SA1.2] RRT[SA1.1]  3/16/2017[SA1.2]         Revision History        User Key Date/Time User Provider Type Action    > SA1.2 3/16/2017  7:05 AM Naz Roca RT Respiratory Therapist Sign     SA1.1 3/16/2017  7:04 AM Naz Roca RT Respiratory Therapist             Progress Notes by Wayne Inman MD at 3/16/2017  4:01 AM     Author:  Wayne Inman MD Service:  Hospitalist Author Type:  Physician    Filed:  3/16/2017  4:03 AM Date of Service:  3/16/2017  4:01 AM Note Created:  3/16/2017  4:01 AM    Status:  Signed :  Wayne Inman MD (Physician)         [facility]    Sepsis Evaluation Progress Note    Date of Service: 03/16/2017    I was called to see Mason PAZ Carlos due to abnormal vital signs triggering the Sepsis SIRS screening alert. He is known to have an infection.     Physical " Exam    Vital Signs:  Temp: 97.2  F (36.2  C) Temp src: Axillary BP: 143/83 Pulse: 103 Heart Rate: 99 Resp: (!) 32 SpO2: 95 % O2 Device: Nasal cannula Oxygen Delivery: 3 LPM    Lab:  Lactic Acid   Date Value Ref Range Status   03/16/2017 2.3 (H) 0.7 - 2.1 mmol/L Final       The patient is at baseline mental status.    The rest of their physical exam is significant for breath sounds are not wheezy, tachypnea, however. . Pt restless, though not delirious.    Assessment and Plan    The SIRS and exam findings are likely multifactorial. Suspected pneumonia, blood loss anemia with extraperitoneal hemorrhage.     Disposition: The patient will remain on the current unit. We will continue to monitor this patient closely.    Wayne Inman MD[JW1.1]       Revision History        User Key Date/Time User Provider Type Action    > JW1.1 3/16/2017  4:03 AM Wayne Inman MD Physician Sign                  Procedure Notes     No notes of this type exist for this encounter.         Progress Notes - Therapies (Notes from 03/16/17 through 03/19/17)      Progress Notes by Niki Chaidez SLP at 3/18/2017  5:33 PM     Author:  Niki Chaidez SLP Service:  (none) Author Type:  Speech Language    Filed:  3/18/2017  5:34 PM Date of Service:  3/18/2017  5:33 PM Note Created:  3/18/2017  5:33 PM    Status:  Signed :  Niki Chaidez SLP (Speech Language)            03/18/17 1600   General Information   Onset Date 03/07/17   Start of Care Date 03/18/17   Referring Physician Ce Patrick MD   Patient Profile Review/OT: Additional Occupational Profile Info See Profile for full history and prior level of function   Patient/Family Goals Statement None stated   Swallowing Evaluation Bedside swallow evaluation   Behaviorial Observations Alert  (Mildly impulsive)   Mode of current nutrition Oral diet   Type of oral diet Regular;Thin liquid   Respiratory Status O2 Supply   Type of O2 supply Nasal cannula   Comments Per  MD note: Pt is a 89-year-old male with past medical history of hypothyroidism and diet-controlled type 2 diabetes, obstructive sleep apnea and CHF as well as atrial fibrillation on chronic anticoagulation with warfarin, who presented initially to the [facility] for evaluation of abdominal pain.  He reports 4-5 days of progressive abdominal pain with diminished oral intake.    Clinical Swallow Evaluation   Oral Musculature generally intact   Structural Abnormalities none present   Dentition other (see comments)  (Has partials, not present at hospital)   Mucosal Quality good   Mandibular Strength and Mobility intact   Oral Labial Strength and Mobility WFL   Lingual Strength and Mobility WFL   Velar Elevation (not able to visualize)   Buccal Strength and Mobility intact   Laryngeal Function Cough;Swallow;Voicing initiated;Dry swallow palpated   Oral Musculature Comments WFL   Additional Documentation Yes   Additional evaluation(s) completed today No   Clinical Swallow Eval: Thin Liquid Texture Trial   Mode of Presentation, Thin Liquids cup;self-fed   Volume of Liquid or Food Presented sips x10   Oral Phase of Swallow WFL   Pharyngeal Phase of Swallow feeling of something stuck in throat;reduction in laryngeal movement  (Mildly reduced laryngeal elevation)   Diagnostic Statement Pt noted globus sensation x1. No overt signs of aspiration.   Clinical Swallow Eval: Puree Solid Texture Trial   Mode of Presentation, Puree spoon;self-fed   Volume of Puree Presented teaspoons x3   Oral Phase, Puree WFL   Pharyngeal Phase, Puree intact   Diagnostic Statement No overt signs of aspiration.   Clinical Swallow Eval: Semisolid Texture Trial   Mode of Presentation, Semisolid spoon;self-fed   Volume of Semisolid Food Presented teaspoon x5   Oral Phase, Semisolid other (see comments)  (Prolonged mastication)   Pharyngeal Phase, Semisolid intact   Diagnostic Statement Suspect prolonged mastication due to lack of partials. No  overt signs of aspiration.   Clinical Swallow Eval: Solid Food Texture Trial   Mode of Presentation, Solid fed by clinician   Volume of Solid Food Presented Soft solids x5   Oral Phase, Solid other (see comments)  (Prolonged mastication)   Pharyngeal Phase, Solid intact   Diagnostic Statement Suspect prolonged mastication due to lack of partials. No overt signs of aspiration.   Esophageal Phase of Swallow   Patient reports or presents with symptoms of esophageal dysphagia No   Esophageal sweep performed during today s vidofluoroscopic exam  No   General Therapy Interventions   Planned Therapy Interventions Dysphagia Treatment   Dysphagia treatment Modified diet education  (Swallow precautions)   Swallow Eval: Clinical Impressions   Skilled Criteria for Therapy Intervention Skilled criteria met.  Treatment indicated.   Functional Assessment Scale (FAS) 5   Treatment Diagnosis Mild Oral Pharyngeal Dysphagia   Diet texture recommendations Dysphagia diet level 2;Thin liquids   Recommended Feeding/Eating Techniques alternate between small bites and sips of food/liquid;maintain upright posture during/after eating for 30 mins;small sips/bites   Therapy Frequency daily   Predicted Duration of Therapy Intervention (days/wks) 1 week   Anticipated Discharge Disposition other (see comments)  (TCU)   Risks and Benefits of Treatment have been explained. Yes   Patient, family and/or staff in agreement with Plan of Care Yes   Clinical Impression Comments SLP - Pt seen for bedside swallow evaluation in pm. Pt presents with mild oral pharyngeal dysphagia. Oral motor exam WFL for pt's age and living situation. Presented PO trials of thin liquids x10, puree x3, semisolid x5, and soft solid x5. Pt reported globus sensation x1 with thin liquids but did not report this for any other textures. Pt's partials are currently at his LTC; suspect lack of partials contributes to prolonged mastication on semisolids and soft solids. Mild reduction  in laryngeal elevation exhibited for all textures. Pt reported soft solids felt scratchy on his throat; suspect pharyngeal residue. Pt exhibited coughing x2 not directly linked to swallow during conversation. Pt educated regarding dysphagia diet level 2 and swallow precautions. Plan to ensure diet tolerance and upgrade diet as able. Recommend dysphagia level diet 2 with thin liquids and the following precautions: upright at 90 degrees, small sips/bites, and slow rate. Discharge to TCU when stable with continued SLP swallow tx.   Total Evaluation Time   Total Evaluation Time (Minutes) 25[AH1.1]        Revision History        User Key Date/Time User Provider Type Action    > AH1.1 3/18/2017  5:34 PM Niki Chaidez, SLP Speech Language Sign            Progress Notes by Andrzej Beltran RT at 3/16/2017  6:08 PM     Author:  Andrzej Beltran RT Service:  Respiratory Therapy Author Type:  Respiratory Therapist    Filed:  3/16/2017  6:11 PM Date of Service:  3/16/2017  6:08 PM Note Created:  3/16/2017  6:08 PM    Status:  Signed :  Andrzej Beltran RT (Respiratory Therapist)         Pt on 3 lpm NC, SpO2 97%, BBS diminished, all the neb tx were given as ordered, will continue to monitor the pt.     RT Dilshad.[YJ1.1]      Revision History        User Key Date/Time User Provider Type Action    > YJ1.1 3/16/2017  6:11 PM Andrzej Beltran RT Respiratory Therapist Sign            Progress Notes by Naz Roca RT at 3/16/2017  7:04 AM     Author:  Naz Roca RT Service:  (none) Author Type:  Respiratory Therapist    Filed:  3/16/2017  7:05 AM Date of Service:  3/16/2017  7:04 AM Note Created:  3/16/2017  7:04 AM    Status:  Signed :  Naz Roca RT (Respiratory Therapist)         Patient is on 1L nasal cannula with SpO2 94%, BBS expiratory wheezes, Nebs tx is given.[SA1.1]    Naz Roca[SA1.2] RRT[SA1.1]  3/16/2017[SA1.2]         Revision History        User Key Date/Time User Provider Type Action     > SA1.2 3/16/2017  7:05 AM Naz Roca, RT Respiratory Therapist Sign     SA1.1 3/16/2017  7:04 AM Naz Roca, RT Respiratory Therapist

## 2017-03-07 NOTE — IP AVS SNAPSHOT
` `     [place] [address] [facility]: [phone]            Medication Administration Report for Mason Gonzalez as of 03/19/17 1431   Legend:    Given Hold Not Given Due Canceled Entry Other Actions    Time Time (Time) Time  Time-Action       Inactive    Active    Linked        Medications 03/13/17 03/14/17 03/15/17 03/16/17 03/17/17 03/18/17 03/19/17    acetaminophen (TYLENOL) Suppository 650 mg  Dose: 650 mg Freq: EVERY 4 HOURS PRN Route: RE  PRN Reason: mild pain  Start: 03/07/17 0950   Admin Instructions: Alternate ibuprofen (if ordered) with acetaminophen.  Maximum acetaminophen dose from all sources = 75 mg/kg/day not to exceed 4 grams/day.               acetaminophen (TYLENOL) tablet 650 mg  Dose: 650 mg Freq: EVERY 4 HOURS PRN Route: PO  PRN Reason: mild pain  Start: 03/07/17 0950   Admin Instructions: Alternate ibuprofen (if ordered) with acetaminophen.  Maximum acetaminophen dose from all sources = 75 mg/kg/day not to exceed 4 grams/day.     1335 (650 mg)-Given        1942 (650 mg)-Given         0536 (650 mg)-Given       2118 (650 mg)-Given        (0755)-Not Given         0913 (650 mg)-Given           albuterol neb solution 2.5 mg  Dose: 2.5 mg Freq: EVERY 2 HOURS PRN Route: NEBULIZATION  PRN Reasons: wheezing,shortness of breath / dyspnea  Start: 03/10/17 1309      0205 (2.5 mg)-Given        0331 (2.5 mg)-Given        0042 (2.5 mg)-Given             alum & mag hydroxide-simethicone (MYLANTA ES/MAALOX  ES) suspension 30 mL  Dose: 30 mL Freq: EVERY 4 HOURS PRN Route: PO  PRN Reason: indigestion  Start: 03/12/17 1524   Admin Instructions: Shake well.     1514 (30 mL)-Given        1309 (30 mL)-Given                artificial saliva (BIOTENE MT) spray 1 spray  Dose: 1 spray Freq: EVERY 6 HOURS PRN Route: MT  PRN Reason: dry mouth  Start: 03/12/17 0921     0939 (1 spray)-Given                benzocaine-menthol (CHLORASEPTIC) 6-10 MG lozenge 1-2 lozenge  Dose: 1-2 lozenge Freq: EVERY 1 HOUR  PRN Route: BU  PRN Reason: sore throat  PRN Comment: dry/sore throat without fever  Start: 03/14/17 1324              benzonatate (TESSALON) capsule 200 mg  Dose: 200 mg Freq: 3 TIMES DAILY Route: PO  Start: 03/17/17 1600        (1630)-Not Given [C]       2241 (200 mg)-Given        0914 (200 mg)-Given       1640 (200 mg)-Given       2055 (200 mg)-Given               0913 (200 mg)-Given       [ ] 1600       [ ] 2100           digoxin (LANOXIN) tablet 125 mcg  Dose: 125 mcg Freq: DAILY Route: PO  Start: 03/14/17 0900     0954 (125 mcg)-Given        0826 (125 mcg)-Given        1033 (125 mcg)-Given        0756 (125 mcg)-Given               0914 (125 mcg)-Given        0913 (125 mcg)-Given           furosemide (LASIX) injection 20 mg  Dose: 20 mg Freq: 2 TIMES DAILY. Route: IV  Start: 03/17/17 1115        1423 (20 mg)-Given               0913 (20 mg)-Given       1640 (20 mg)-Given        0913 (20 mg)-Given       [ ] 1600           glucose 40 % gel 15-30 g  Dose: 15-30 g Freq: EVERY 15 MIN PRN Route: PO  PRN Reason: low blood sugar  Start: 03/07/17 1019   Admin Instructions: Give 15 g for BG 51 to 69 mg/dL IF patient is conscious and able to swallow. Give 30 g for BG less than or equal to 50 mg/dL IF patient is conscious and able to swallow. Do NOT give glucose gel via enteral tube.  IF patient has enteral tube: give apple juice 120 mL (4 oz or 15 g of CHO) via enteral tube for BG 51 to 69 mg/dL.  Give apple juice 240 mL (8 oz or 30 g of CHO) via enteral tube for BG less than or equal to 50 mg/dL.              Or  dextrose 50 % injection 25-50 mL  Dose: 25-50 mL Freq: EVERY 15 MIN PRN Route: IV  PRN Reason: low blood sugar  Start: 03/07/17 1019   Admin Instructions: Use if have IV access, BG less than 70 mg/dL and meet dose criteria below:  Dose if conscious and alert (or disorientated) and NPO = 25 mL  Dose if unconscious / not alert = 50 mL  Vesicant.              Or  glucagon injection 1 mg  Dose: 1 mg Freq: EVERY 15  MIN PRN Route: SC  PRN Reason: low blood sugar  PRN Comment: May repeat x 1 only  Start: 03/07/17 1019   Admin Instructions: May give SQ or IM. IF BG less than or equal to 50 mg/dL and no IV access.  ONLY use glucagon IF patient has NO IV access AND is UNABLE to swallow.               guaiFENesin (ROBITUSSIN) 20 mg/mL solution 10 mL  Dose: 10 mL Freq: EVERY 4 HOURS PRN Route: PO  PRN Reason: cough  Start: 03/08/17 1918    0142 (10 mL)-Given        1955 (10 mL)-Given         0115 (10 mL)-Given       1506 (10 mL)-Given        (0756)-Not Given             HYDROmorphone (PF) (DILAUDID) injection 0.3 mg  Dose: 0.3 mg Freq: EVERY 3 HOURS PRN Route: IV  PRN Reason: severe pain  Start: 03/07/17 1401              ipratropium - albuterol 0.5 mg/2.5 mg/3 mL (DUONEB) neb solution 3 mL  Dose: 3 mL Freq: 4 TIMES DAILY Route: NEBULIZATION  Start: 03/10/17 1500    0847 (3 mL)-Given       1147 (3 mL)-Given       1606 (3 mL)-Given       1957 (3 mL)-Given        0822 (3 mL)-Given       1136 (3 mL)-Given       (1522)-Not Given       2005 (3 mL)-Given        0823 (3 mL)-Given       1130 (3 mL)-Given       (1608)-Not Given       1700 (3 mL)-Given       2005 (3 mL)-Given        0727 (3 mL)-Given       1118 (3 mL)-Given       1543 (3 mL)-Given       1855 (3 mL)-Given        0712 (3 mL)-Given       1138 (3 mL)-Given       1558 (3 mL)-Given       (1955)-Not Given        (0839)-Not Given       1219 (3 mL)-Given       1719 (3 mL)-Given       1954 (3 mL)-Given        0714 (3 mL)-Given       1138 (3 mL)-Given       [ ] 1500       [ ] 1900           levalbuterol (XOPENEX CONC) neb solution 1.25 mg  Dose: 1.25 mg Freq: EVERY 4 HOURS PRN Route: NEBULIZATION  PRN Reason: wheezing  Start: 03/09/17 1247              levothyroxine (SYNTHROID/LEVOTHROID) tablet 200 mcg  Dose: 200 mcg Freq: DAILY Route: PO  Start: 03/08/17 0730    0606 (200 mcg)-Given               (0727)-Not Given        (0800)-Not Given        (0651)-Not Given        0620 (200  "mcg)-Given               0556 (200 mcg)-Given               0607 (200 mcg)-Given                  lidocaine (LMX4) cream  Freq: EVERY 1 HOUR PRN Route: Top  PRN Reason: mild pain  PRN Comment: with VAD insertion or accessing implanted port,  Start: 03/07/17 1640   Admin Instructions: Do NOT give if patient has a history of allergy to any local anesthetic or any \"minnie\" product.   Apply 30 min prior to VAD insertion or port access.  MAX Dose:  2.5 gm (  of 5 gm tube)               lidocaine 1 % 0.5-5 mL  Dose: 0.5-5 mL Freq: EVERY 1 HOUR PRN Route: OTHER  PRN Comment: mild pain with VAD insertion or accessing implanted port.  Start: 03/17/17 1313   Admin Instructions: Do NOT give if patient has a history of allergy to any local anesthetic or any \"minnie\" product. MAX dose 1 mL subcutaneous OR intradermal in divided doses.         1355 (3 mL)-Given by Other             lidocaine 1 % 1 mL  Dose: 1 mL Freq: EVERY 1 HOUR PRN Route: OTHER  PRN Comment: mild pain with VAD insertion or accessing implanted port,  Start: 03/07/17 1640   Admin Instructions: Do NOT give if patient has a history of allergy to any local anesthetic or any \"minnie\" product. MAX dose 1 mL subcutaneous OR intradermal in divided doses.               loratadine (CLARITIN) tablet 10 mg  Dose: 10 mg Freq: DAILY Route: PO  Start: 03/17/17 1200                0914 (10 mg)-Given        0913 (10 mg)-Given           magnesium sulfate 4 g in 100 mL sterile water (premade)  Dose: 4 g Freq: EVERY 4 HOURS PRN Route: IV  PRN Reason: magnesium supplementation  Start: 03/11/17 0804   Admin Instructions: For serum Mg++ less than 1.6 mg/dL  Give 4 g and recheck magnesium level 2 hours after dose, and next AM.               melatonin tablet 4 mg  Dose: 4 mg Freq: AT BEDTIME PRN Route: PO  PRN Reasons: sleep,Insomnia  Start: 03/15/17 1444      2250 (4 mg)-Given         0120 (4 mg)-Given        0135 (4 mg)-Given            methylPREDNISolone sodium succinate (solu-MEDROL) " injection 40 mg  Dose: 40 mg Freq: EVERY 8 HOURS Route: IV  Start: 03/17/17 1230   Admin Instructions: Doses >125mg need to be in at least 50 mL IVPB         1439 (40 mg)-Given       2242 (40 mg)-Given        0556 (40 mg)-Given       1421 (40 mg)-Given       2054 (40 mg)-Given               0519 (40 mg)-Given       [ ] 1400       [ ] 2200           metoprolol (LOPRESSOR) injection 5-15 mg  Dose: 5-15 mg Freq: EVERY 6 HOURS PRN Route: IV  PRN Reason: other  PRN Comment: HR > 120  Start: 03/08/17 1909   Admin Instructions: Give 5 mg q 10 minutes PRN for HR > 120, up to a total of 3x (15 mg) Hold for SBP < 90.               metoprolol (LOPRESSOR) tablet 100 mg  Dose: 100 mg Freq: 2 TIMES DAILY Route: PO  Start: 03/09/17 2100    0913 (100 mg)-Given       2129 (100 mg)-Given        0952 (100 mg)-Given       2135 (100 mg)-Given        0826 (100 mg)-Given       2054 (100 mg)-Given        1033 (100 mg)-Given       2240 (100 mg)-Given        (0756)-Not Given              2100 (100 mg)-Given        0914 (100 mg)-Given       2055 (100 mg)-Given        0913 (100 mg)-Given       [ ] 2100           mirtazapine (REMERON) tablet 15 mg  Dose: 15 mg Freq: AT BEDTIME Route: PO  Start: 03/14/17 2200     2135 (15 mg)-Given        2101 (15 mg)-Given        2241 (15 mg)-Given        2241 (15 mg)-Given        2055 (15 mg)-Given               [ ] 2100           naloxone (NARCAN) injection 0.1-0.4 mg  Dose: 0.1-0.4 mg Freq: EVERY 2 MIN PRN Route: IV  PRN Reason: opioid reversal  Start: 03/07/17 0947   Admin Instructions: For respiratory rate LESS than or EQUAL to 8.  Partial reversal dose:  0.1 mg titrated q 2 minutes for Analgesia Side Effects Monitoring Sedation Level of 3 (frequently drowsy, arousable, drifts to sleep during conversation).Full reversal dose:  0.4 mg bolus for Analgesia Side Effects Monitoring Sedation Level of 4 (somnolent, minimal or no response to stimulation).               nitroglycerin (NITROSTAT) sublingual tablet  0.4 mg  Dose: 0.4 mg Freq: EVERY 5 MIN PRN Route: SL  PRN Reason: chest pain  Start: 03/07/17 1640   Admin Instructions: Maximum 3 doses in 15 minutes.  Notify provider if no relief after 3 doses.    Do NOT give nitroglycerin SLIF the patient has taken avanafil (STENDRA), sildenafil (VIAGRA) or (REVATIO) within the last 8 hours, vardenafil (LEVITRA) or (STAXYN) within the last 18 hours, tadalafil (CIALIS) or (ADCIRCA) within the last 36 hours. Inform provider if patient has taken one of these medications.  If patient is still having acute angina requiring treatment, an alternative treatment option may be used such as: IV beta-blocker [2.5 mg - 5 mg metoprolol (LOPRESSOR)] if ordered by a provider.               ondansetron (ZOFRAN-ODT) ODT tab 4 mg  Dose: 4 mg Freq: EVERY 6 HOURS PRN Route: PO  PRN Reason: nausea  Start: 03/07/17 0950   Admin Instructions: This is Step 1 of nausea and vomiting management.  If nausea not resolved in 15 minutes, go to Step 2 prochlorperazine (COMPAZINE). Do not push through foil backing. Peel back foil and gently remove. Place on tongue immediately. Administration with liquid unnecessary     1335 (4 mg)-Given        1350 (4 mg)-Given                      Or  ondansetron (ZOFRAN) injection 4 mg  Dose: 4 mg Freq: EVERY 6 HOURS PRN Route: IV  PRN Reasons: nausea,vomiting  Start: 03/07/17 0950   Admin Instructions: This is Step 1 of nausea and vomiting management.  If nausea not resolved in 15 minutes, go to Step 2 prochlorperazine (COMPAZINE).  Irritant.                       0619 (4 mg)-Given             pantoprazole (PROTONIX) EC tablet 40 mg  Dose: 40 mg Freq: EVERY MORNING Route: PO  Start: 03/12/17 1630   Admin Instructions: DO NOT CRUSH.     0913 (40 mg)-Given        0952 (40 mg)-Given        0826 (40 mg)-Given        1033 (40 mg)-Given        (0757)-Not Given               0914 (40 mg)-Given        0913 (40 mg)-Given           piperacillin-tazobactam (ZOSYN) 4.5 g vial to attach  to  mL bag  Dose: 4.5 g Freq: EVERY 6 HOURS Route: IV  Indications of Use: HEALTHCARE-ASSOCIATED PNEUMONIA  Last Dose: 4.5 g (03/18/17 1422)  Start: 03/16/17 1600       1750 (4.5 g)-New Bag       2240 (4.5 g)-New Bag        0512 (4.5 g)-New Bag       1439 (4.5 g)-New Bag       2100 (4.5 g)-New Bag        0257 (4.5 g)-New Bag       0925 (4.5 g)-New Bag       1422 (4.5 g)-New Bag       2054 (4.5 g)-New Bag        0143 (4.5 g)-New Bag              0913 (4.5 g)-New Bag       [ ] 1500       [ ] 2100           polyethylene glycol (MIRALAX/GLYCOLAX) Packet 17 g  Dose: 17 g Freq: 2 TIMES DAILY PRN Route: PO  PRN Comment: constipation  Start: 03/14/17 1343   Admin Instructions: 1 Packet = 17 grams. Mixed prescribed dose in 8 ounces of water. Follow with 8 oz. of water.      1903 (17 g)-Given                potassium chloride (KLOR-CON) Packet 20-40 mEq  Dose: 20-40 mEq Freq: EVERY 2 HOURS PRN Route: ORAL OR FEED  PRN Reason: potassium supplementation  Start: 03/11/17 0804   Admin Instructions: Use if unable to tolerate tablets.  If Serum K+ 3.0-3.3, dose = 60 mEq po total dose (40 mEq x1 followed in 2 hours by 20 mEq x1). Recheck K+ level 4 hours after dose and the next AM.  If Serum K+ 2.5-2.9, dose = 80 mEq po total dose (40 mEq Q2H x2). Recheck K+ level 4 hours after dose and the next AM.  If Serum K+ less than 2.5, See IV order.  Dissolve packet contents in 4-8 ounces of cold water or juice.               potassium chloride 10 mEq in 100 mL intermittent infusion  Dose: 10 mEq Freq: EVERY 1 HOUR PRN Route: IV  PRN Reason: potassium supplementation  Start: 03/11/17 0804   Admin Instructions: Infuse via PERIPHERAL LINE or CENTRAL LINE. Use for central line replacement if patient weight less than 65 kg, if patient is on TPN with high potassium content or if unit does not stock 20 mEq bags.   If Serum K+ 3.0-3.3, dose = 10 mEq/hr x4 doses (40 mEq IV total dose). Recheck K+ level 2 hours after dose and the next AM.   If  Serum K+ less than 3.0, dose = 10 mEq/hr x6 doses (60 mEq IV total dose). Recheck K+ level 2 hours after dose and the next AM.               potassium chloride 10 mEq in 100 mL intermittent infusion with 10 mg lidocaine  Dose: 10 mEq Freq: EVERY 1 HOUR PRN Route: IV  PRN Reason: potassium supplementation  Start: 03/11/17 0804   Admin Instructions: Infuse via PERIPHERAL LINE. Use potassium with lidocaine for pain with peripheral administration.  If Serum K+ 3.0-3.3, dose = 10 mEq/hr x4 doses (40 mEq IV total dose). Recheck K+ level 2 hours after dose and the next AM.  If Serum K+ less than 3.0, dose = 10 mEq/hr x6 doses (60 mEq IV total dose). Recheck K+ level 2 hours after dose and the next AM.               potassium chloride 20 mEq in 50 mL intermittent infusion  Dose: 20 mEq Freq: EVERY 1 HOUR PRN Route: IV  PRN Reason: potassium supplementation  Start: 03/11/17 0804   Admin Instructions: Infuse via CENTRAL LINE Only. May need EKG if less than 65 kg or on TPN - Max rate is 0.3 mEq/kg/hr for patients not on EKG monitoring.   If Serum K+ 3.0-3.3, dose = 20 mEq/hr x2 doses (40 mEq IV total dose). Recheck K+ level 2 hours after dose and the next AM.  If Serum K+ less than 3.0, dose = 20 mEq/hr x3 doses (60 mEq IV total dose). Recheck K+ level 2 hours after dose and the next AM.               potassium chloride SA (K-DUR/KLOR-CON M) CR tablet 20-40 mEq  Dose: 20-40 mEq Freq: EVERY 2 HOURS PRN Route: PO  PRN Reason: potassium supplementation  Start: 03/11/17 0804   Admin Instructions: Use if able to take PO.   If Serum K+ 3.0-3.3, dose = 60 mEq po total dose (40 mEq x1 followed in 2 hours by 20 mEq x1). Recheck K+ level 4 hours after dose and the next AM.  If Serum K+ 2.5-2.9, dose = 80 mEq po total dose (40 mEq Q2H x2). Recheck K+ level 4 hours after dose and the next AM.  If Serum K+ less than 2.5, See IV order.  DO NOT CRUSH               prochlorperazine (COMPAZINE) injection 5 mg  Dose: 5 mg Freq: EVERY 6 HOURS  PRN Route: IV  PRN Reasons: nausea,vomiting  Start: 03/07/17 0950   Admin Instructions: This is Step 2 of nausea and vomiting management.   If nausea not resolved in 15 minutes, give metoclopramide (REGLAN) if ordered (step 3 of nausea and vomiting management)                     Or  prochlorperazine (COMPAZINE) tablet 5 mg  Dose: 5 mg Freq: EVERY 6 HOURS PRN Route: PO  PRN Reason: vomiting  Start: 03/07/17 0950   Admin Instructions: This is Step 2 of nausea and vomiting management.   If nausea not resolved in 15 minutes, give metoclopramide (REGLAN) if ordered (step 3 of nausea and vomiting management)     1946 (5 mg)-Given                Or  prochlorperazine (COMPAZINE) Suppository 12.5 mg  Dose: 12.5 mg Freq: EVERY 12 HOURS PRN Route: RE  PRN Reasons: nausea,vomiting  Start: 03/07/17 0950   Admin Instructions: This is Step 2 of nausea and vomiting management.   If nausea not resolved in 15 minutes, give metoclopramide (REGLAN) if ordered (step 3 of nausea and vomiting management)                      QUEtiapine (SEROquel) half-tab 12.5-25 mg  Dose: 12.5-25 mg Freq: 3 TIMES DAILY PRN Route: PO  PRN Comment: anxiety  Start: 03/12/17 2210    2131 (12.5 mg)-Given        1441 (12.5 mg)-Given       2313 (25 mg)-Given        0509 (25 mg)-Given       1451 (25 mg)-Given       2250 (25 mg)-Given        0536 (25 mg)-Given        0121 (12.5 mg)-Given       (0756)-Not Given        0135 (12.5 mg)-Given        0451 (12.5 mg)-Given       0913 (12.5 mg)-Given           rincinol P.R.N liquid 10 mL  Dose: 10 mL Freq: EVERY 6 HOURS PRN Route: SWISH & SPIT  PRN Reason: other  PRN Comment: For Pain from oral mucositis.  Start: 03/14/17 1443   Admin Instructions: For best results, avoid eating or drinking for one hour after use.      1845 (10 mL)-Given        1442 (10 mL)-Given               sennosides (SENOKOT) tablet 1 tablet  Dose: 1 tablet Freq: AT BEDTIME Route: PO  Start: 03/07/17 2200 2130 (1 tablet)-Given        2135 (1  tablet)-Given        2101 (1 tablet)-Given        2241 (1 tablet)-Given        2241 (1 tablet)-Given        2054 (1 tablet)-Given               [ ] 2100           sodium chloride (PF) 0.9% PF flush 10 mL  Dose: 10 mL Freq: EVERY 8 HOURS Route: IK  Start: 03/17/17 1315   Admin Instructions: And Q1H PRN, to lock peripheral midline IV catheter dormant lumen. Recommended to flush Q8H each lumen even during infusions         1442 (10 mL)-Given       2104 (10 mL)-Given        0556 (10 mL)-Given       1422 (10 mL)-Given       2054 (10 mL)-Given        0144 (10 mL)-Given       0453 (10 mL)-Given       0517 (10 mL)-Given       [ ] 1315       [ ] 2115           sodium chloride (PF) 0.9% PF flush 10-20 mL  Dose: 10-20 mL Freq: EVERY 1 HOUR PRN Route: IK  PRN Reasons: line flush,post meds or blood draw  PRN Comment: to flush each peripheral midline IV catheter lumen  Start: 03/17/17 1313   Admin Instructions: 10 mL post IV meds;  20 mL post blood draw         1356 (20 mL)-Given        0914 (10 mL)-Given            sodium chloride (PF) 0.9% PF flush 3 mL  Dose: 3 mL Freq: EVERY 8 HOURS Route: IK  Start: 03/07/17 1015   Admin Instructions: And Q1H PRN, to lock peripheral IV dormant line.     0606 (3 mL)-Given       1425 (3 mL)-Given       2228 (3 mL)-Given        (0655)-Not Given       (1519)-Not Given       2135 (3 mL)-Given        0511 (3 mL)-Given       1430 (3 mL)-Given [C]       2331 (3 mL)-Given        0650 (3 mL)-Given              2249 (3 mL)-Given        0512 (3 mL)-Given       (1442)-Not Given       (2205)-Not Given        0556 (10 mL)-Given       (1422)-Not Given       (2108)-Not Given        0523 (3 mL)-Positive              [ ] 2200           sodium chloride (PF) 0.9% PF flush 3 mL  Dose: 3 mL Freq: EVERY 1 HOUR PRN Route: IK  PRN Reason: line flush  Start: 03/07/17 1011   Admin Instructions: for peripheral IV flush post IV meds     1945 (3 mL)-Given         2058 (3 mL)-Given               traMADol (ULTRAM) half-tab  25 mg  Dose: 25 mg Freq: EVERY 6 HOURS PRN Route: PO  PRN Reason: moderate to severe pain  Start: 03/08/17 0927     0211 (25 mg)-Given            0515 (25 mg)-Given           Warfarin Therapy Reminder (Check START DATE - warfarin may be starting in the FUTURE)  Freq: CONTINUOUS PRN Route: XX  Start: 03/09/17 1551   Admin Instructions: *Note to reorder warfarin daily*  Pharmacy Warfarin Dosing Service  Patient is on Warfarin Therapy - check for daily order              Future Medications  Medications 03/13/17 03/14/17 03/15/17 03/16/17 03/17/17 03/18/17 03/19/17       warfarin (COUMADIN) tablet 2.5 mg  Dose: 2.5 mg Freq: ONCE AT 6PM Route: PO  Start: 03/19/17 1800          [ ] 1800          Completed Medications  Medications 03/13/17 03/14/17 03/15/17 03/16/17 03/17/17 03/18/17 03/19/17         Dose: 1 mg Freq: ONCE AT 6PM Route: PO  Start: 03/18/17 1800   End: 03/18/17 1834         1834 (1 mg)-Given           Discontinued Medications  Medications 03/13/17 03/14/17 03/15/17 03/16/17 03/17/17 03/18/17 03/19/17         Dose: 2 mL Freq: EVERY 4 HOURS Route: NEBULIZATION  Start: 03/16/17 1500   End: 03/17/17 1224   Admin Instructions: Nebulization tubing with a FILTER is recommended with acetylcysteine nebs.        1543 (2 mL)-Given       1855 (2 mL)-Given        0042 (2 mL)-Given       (0349)-Not Given       0712 (2 mL)-Given       1139 (2 mL)-Given       1224-Med Discontinued           Dose: 100 mg Freq: 3 TIMES DAILY Route: PO  Start: 03/16/17 1430   End: 03/17/17 1224       1506 (100 mg)-Given       2241 (100 mg)-Given        0756 (100 mg)-Given              1224-Med Discontinued           Dose: 3 mL Freq: ONCE Route: NEBULIZATION  Start: 03/16/17 1430   End: 03/17/17 1224               1224-Med Discontinued           Dose: 1 each Freq: NO DOSE TODAY (WARFARIN) Route: XX  Start: 03/17/17 1159   End: 03/17/17 8856   Admin Instructions: No dose of Warfarin due today per order.         2358-Med Discontinued            Dose: 1 each Freq: NO DOSE TODAY (WARFARIN) Route: XX  Start: 03/16/17 1113   End: 03/17/17 1201   Admin Instructions: No dose of Warfarin due today per order         1201-Med Discontinued      Medications 03/13/17 03/14/17 03/15/17 03/16/17 03/17/17 03/18/17 03/19/17

## 2017-03-07 NOTE — IP AVS SNAPSHOT
` ` Patient Information     Patient Name Sex     Mason Gonzalez ([id number]) Male 1926       Room Bed    [facility] 6622-02      Patient Demographics     Address Phone    [address] [place] [address] [phone] (Home)  None (Work)  [phone] (Mobile)      Patient Ethnicity & Race     Ethnic Group Patient Race     White      Emergency Contact(s)     Name Relation Home Work Mobile    Kandis Dorman Daughter   [phone]    Carlos Mehta Son   [phone]      Documents on File        Status Date Received Description       Documents for the Patient    Affiliate Privacy placeholder   phase3    Consent for EHR Access Received 17     Insurance Card       External Medication Information Consent       Patient ID       [facility] Specified Other       Consent for Services/Privacy Notice - Hospital/Clinic Received 17     Privacy Notice - [place] Received 17        Documents for the Encounter    CMS IM for Patient Signature Received 17 [facility]    EMS/Ambulance Record  17 [facility] AM[facility]ULANCE      Admission Information     Attending Provider Admitting Provider Admission Type Admission Date/Time    Esteban Voss MD Rauniyar, Amit Kumar, MD Urgent 17  0922    Discharge Date Hospital Service [facility]Cert Status [facility]    [facility] Room/Bed Admission Status       [address] UNIT /-02 Admission (Confirmed)       Admission     Complaint    extraperitoneal hemorrhage, Intraabdominal hemorrhage, PELVIC HEMATOMA      Hospital Account     Name Acct ID Class Status Primary Coverage    Mason Gonzalez [id number] Inpatient [facility]            Guarantor Account (for Hospital Account #[id number])     Name Relation to Pt Service Area Active? Acct Type    Mason Gonzalez Self FCS Yes Other    Address Phone          [address] [place] [address] [phone](H)  None(O)               Coverage Information (for Hospital Account #[id number])     F/O Payor/Plan Precert #    COMMERCIAL/[facility]     Subscriber Subscriber #    Mason Gonzalez [id number]    Address Phone    [address]  [address] [place] [address] [phone]

## 2017-03-07 NOTE — IP AVS SNAPSHOT
MRN:[id number]                      After Visit Summary   3/7/2017    Mason Gonzalez    MRN: [id number]           Thank you!     Thank you for choosing [facility] for your care. Our goal is always to provide you with excellent care. Hearing back from our patients is one way we can continue to improve our services. Please take a few minutes to complete the written survey that you may receive in the mail after you visit with us. Thank you!        Patient Information     Date Of Birth          5/13/1926        About your hospital stay     You were admitted on:  March 7, 2017 You last received care in the:  [place] [facility] [address] [facility]    You were discharged on:  March 19, 2017        Reason for your hospital stay       Spontaneous extraperitoneal bleed in context of coumadin coagulopathy.                  Who to Call     For medical emergencies, please call 911.  For non-urgent questions about your medical care, please call your primary care provider or clinic, None  For questions related to your surgery, please call your surgery clinic        Attending Provider     Provider Specialty    Matt Walter MD Internal Medicine    Esteban Voss MD Internal Medicine       Primary Care Provider    None Specified       No primary provider on file.        After Care Instructions     Activity - Up ad mauro           Advance Diet as Tolerated       Follow this diet upon discharge:   Combination Diet Regular Diet Adult; Dysphagia Diet Level 2: Mechan Altered; Thin Liquids (water, ice chips, juice, milk gelatin, ice cream, etc)            General info for SNF       Length of Stay Estimate: Long Term Care  Condition at Discharge: Stable  Level of care:skilled   Rehabilitation Potential: Fair  Admission H&P remains valid and up-to-date: Yes  Recent Chemotherapy: N/A  Use Nursing Home Standing Orders: Yes            Mantoux instructions       Give two-step Mantoux (PPD) Per Facility Policy Yes   "                Follow-up Appointments     Follow-up and recommended labs and tests        PCP at long term SNF next week.                  Your next 10 appointments already scheduled     Mar 20, 2017 12:00 PM CDT   Inpatient Meal Follow Up Therapy with Izabella Crowder   [facility] ([facility])    [address]  [place] [address]   [phone]              Warfarin Instruction     You have started taking a medicine called warfarin. This is a blood-thinning medicine (anticoagulant). It helps prevent and treat blood clots.      Before leaving the hospital, make sure you know how much to take and how long to take it.      You will need regular blood tests to make sure your blood is clotting safely. It is very important to see your doctor for regular blood tests.    Talk to your doctor before taking any new medicine (this includes over-the-counter drugs and herbal products). Many medicines can interact with warfarin. This may cause more bleeding or too much clotting.     Eating a lot of vitamin K--found in green, leafy vegetables--can change the way warfarin works in your body. Do NOT avoid these foods. Instead, try to eat the same amount each day.     Bleeding is the most common side-effect of warfarin. You may notice bleeding gums, a bloody nose, bruises and bleeding longer when you cut yourself. See a doctor at once if:   o You cough up blood  o You find blood in your stool (poop)  o You have a deep cut, or a cut that bleeds longer than 10 minutes   o You have a bad cut, hard fall, accident or hit your head (go to urgent care or the emergency room).    For women who can get pregnant: This medicine can harm an unborn baby. Be very careful not to get pregnant while taking this medicine. If you think you might be pregnant, call your doctor right away.    For more information, read \"Guide to Warfarin Therapy,  the booklet you received in the hospital.        Pending " "Results     Date and Time Order Name Status Description    3/17/2017 1117 Haptoglobin In process             Statement of Approval     Ordered          17 1402  I have reviewed and agree with all the recommendations and orders detailed in this document.  EFFECTIVE NOW     Approved and electronically signed by:  Ce Patrick MD             Admission Information     Date & Time Provider Department Dept. Phone    3/7/2017 Esteban Voss MD [place] [address] [facility] [phone]      Your Vitals Were     Blood Pressure Pulse Temperature Respirations Height Weight    113/63 (BP Location: Right arm) 89 97.5  F (36.4  C) (Oral) 16 1.829 m (6') 90.1 kg (198 lb 10.2 oz)    Pulse Oximetry BMI (Body Mass Index)                95% 26.94 kg/m2          [employer] Information     [employer] lets you send messages to your doctor, view your test results, renew your prescriptions, schedule appointments and more. To sign up, go to [email or web address] . Click on \"Log in\" on the left side of the screen, which will take you to the Welcome page. Then click on \"Sign up Now\" on the right side of the page.     You will be asked to enter the access code listed below, as well as some personal information. Please follow the directions to create your username and password.     Your access code is: [email or web address]  Expires: 2017  2:23 PM     Your access code will  in 90 days. If you need help or a new code, please call your [facility] clinic or [phone].        Care EveryWhere ID     This is your Care EveryWhere ID. This could be used by other organizations to access your [facility] medical records  [id number]           Review of your medicines      START taking        Dose / Directions    benzonatate 200 MG capsule   Commonly known as:  TESSALON   Used for:  Cough        Dose:  200 mg   Take 1 capsule (200 mg) by mouth 3 times daily   Quantity:  20 capsule   Refills:  0       digoxin 125 MCG tablet "   Commonly known as:  LANOXIN   Used for:  Atrial fibrillation, unspecified type (H)        Dose:  125 mcg   Take 1 tablet (125 mcg) by mouth daily   Quantity:  30 tablet   Refills:  0       mirtazapine 15 MG tablet   Commonly known as:  REMERON   Used for:  Depression, unspecified depression type        Dose:  15 mg   Take 1 tablet (15 mg) by mouth At Bedtime   Quantity:  30 tablet   Refills:  0       pantoprazole 40 MG EC tablet   Commonly known as:  PROTONIX   Used for:  Gastroesophageal reflux disease without esophagitis        Dose:  40 mg   Take 1 tablet (40 mg) by mouth daily Take 30-60 minutes before a meal.   Quantity:  30 tablet   Refills:  1       predniSONE 20 MG tablet   Commonly known as:  DELTASONE   Used for:  Cough        Dose:  40 mg   Take 2 tablets (40 mg) by mouth daily for 5 days   Quantity:  10 tablet   Refills:  0         CONTINUE these medicines which may have CHANGED, or have new prescriptions. If we are uncertain of the size of tablets/capsules you have at home, strength may be listed as something that might have changed.        Dose / Directions    metoprolol 100 MG tablet   Commonly known as:  LOPRESSOR   This may have changed:    - medication strength  - how much to take   Used for:  Atrial fibrillation, unspecified type (H)        Dose:  100 mg   Take 1 tablet (100 mg) by mouth 2 times daily   Quantity:  60 tablet   Refills:  0       traMADol 50 MG tablet   Commonly known as:  ULTRAM   This may have changed:  reasons to take this        Dose:  25 mg   Take 0.5 tablets (25 mg) by mouth every 6 hours as needed   Quantity:  28 tablet   Refills:  0       warfarin 1 MG tablet   Commonly known as:  COUMADIN   This may have changed:    - how much to take  - additional instructions   Used for:  Atrial fibrillation, unspecified type (H)        Dose:  2 mg   Take 2 tablets (2 mg) by mouth See Admin Instructions   Quantity:  30 tablet   Refills:  0         CONTINUE these medicines which have NOT  CHANGED        Dose / Directions    AMOXICILLIN PO        Dose:  2000 mg   Take 2,000 mg by mouth once One hour before dental cleaning   Refills:  0       HYDROXYPROPYL METHYLCELLULOSE OP        Place into both eyes 3 times daily   Refills:  0       K-DUR PO        Dose:  20 mEq   Take 20 mEq by mouth daily   Refills:  0       * LASIX PO        Dose:  40 mg   Take 40 mg by mouth daily   Refills:  0       * FUROSEMIDE PO        Dose:  20 mg   Take 20 mg by mouth daily (with lunch) At 1400   Refills:  0       MAGNESIUM OXIDE PO        Dose:  400 mg   Take 400 mg by mouth At Bedtime   Refills:  0       MECLIZINE HCL PO        Dose:  25 mg   Take 25 mg by mouth every 6 hours as needed for dizziness   Refills:  0       mineral oil-hydrophilic petrolatum        Apply topically 2 times daily as needed for dry skin   Refills:  0       polyethylene glycol Packet   Commonly known as:  MIRALAX/GLYCOLAX   Indication:  if no stool every 4 days        Dose:  1 packet   Take 1 packet by mouth daily as needed for constipation Give every 4 days if no stool   Refills:  0       prochlorperazine 25 MG Suppository   Commonly known as:  COMPAZINE        Dose:  25 mg   Place 25 mg rectally every 12 hours as needed for nausea   Refills:  0       sennosides 8.6 MG tablet   Commonly known as:  SENOKOT        Dose:  1 tablet   Take 1 tablet by mouth At Bedtime   Refills:  0       SYNTHROID PO        Dose:  200 mcg   Take 200 mcg by mouth daily   Refills:  0       TYLENOL PO        Dose:  650 mg   Take 650 mg by mouth 4 times daily   Refills:  0       VITAMIN D (CHOLECALCIFEROL) PO        Dose:  4000 Units   Take 4,000 Units by mouth daily   Refills:  0       * Notice:  This list has 2 medication(s) that are the same as other medications prescribed for you. Read the directions carefully, and ask your doctor or other care provider to review them with you.      STOP taking     TAMIFLU PO                Where to get your medicines      Some of  these will need a paper prescription and others can be bought over the counter. Ask your nurse if you have questions.     You don't need a prescription for these medications     benzonatate 200 MG capsule    digoxin 125 MCG tablet    metoprolol 100 MG tablet    mirtazapine 15 MG tablet    pantoprazole 40 MG EC tablet    predniSONE 20 MG tablet    warfarin 1 MG tablet         Information about where to get these medications is not yet available     ! Ask your nurse or doctor about these medications     traMADol 50 MG tablet                Protect others around you: Learn how to safely use, store and throw away your medicines at www.disposemymeds.org.             Medication List: This is a list of all your medications and when to take them. Check marks below indicate your daily home schedule. Keep this list as a reference.      Medications           Morning Afternoon Evening Bedtime As Needed    AMOXICILLIN PO   Take 2,000 mg by mouth once One hour before dental cleaning                                benzonatate 200 MG capsule   Commonly known as:  TESSALON   Take 1 capsule (200 mg) by mouth 3 times daily   Last time this was given:  200 mg on 3/19/2017  9:13 AM                                digoxin 125 MCG tablet   Commonly known as:  LANOXIN   Take 1 tablet (125 mcg) by mouth daily   Last time this was given:  125 mcg on 3/19/2017  9:13 AM                                HYDROXYPROPYL METHYLCELLULOSE OP   Place into both eyes 3 times daily                                K-DUR PO   Take 20 mEq by mouth daily   Last time this was given:  20 mEq on 3/15/2017  8:27 AM                                * LASIX PO   Take 40 mg by mouth daily   Last time this was given:  40 mg on 3/13/2017  9:14 AM                                * FUROSEMIDE PO   Take 20 mg by mouth daily (with lunch) At 1400   Last time this was given:  40 mg on 3/13/2017  9:14 AM                                MAGNESIUM OXIDE PO   Take 400 mg by mouth At  Bedtime                                MECLIZINE HCL PO   Take 25 mg by mouth every 6 hours as needed for dizziness                                metoprolol 100 MG tablet   Commonly known as:  LOPRESSOR   Take 1 tablet (100 mg) by mouth 2 times daily   Last time this was given:  100 mg on 3/19/2017  9:13 AM                                mineral oil-hydrophilic petrolatum   Apply topically 2 times daily as needed for dry skin                                mirtazapine 15 MG tablet   Commonly known as:  REMERON   Take 1 tablet (15 mg) by mouth At Bedtime   Last time this was given:  15 mg on 3/18/2017  8:55 PM                                pantoprazole 40 MG EC tablet   Commonly known as:  PROTONIX   Take 1 tablet (40 mg) by mouth daily Take 30-60 minutes before a meal.   Last time this was given:  40 mg on 3/19/2017  9:13 AM                                polyethylene glycol Packet   Commonly known as:  MIRALAX/GLYCOLAX   Take 1 packet by mouth daily as needed for constipation Give every 4 days if no stool   Last time this was given:  17 g on 3/14/2017  7:03 PM                                predniSONE 20 MG tablet   Commonly known as:  DELTASONE   Take 2 tablets (40 mg) by mouth daily for 5 days   Last time this was given:  30 mg on 3/15/2017  8:26 AM                                prochlorperazine 25 MG Suppository   Commonly known as:  COMPAZINE   Place 25 mg rectally every 12 hours as needed for nausea                                sennosides 8.6 MG tablet   Commonly known as:  SENOKOT   Take 1 tablet by mouth At Bedtime   Last time this was given:  1 tablet on 3/18/2017  8:54 PM                                SYNTHROID PO   Take 200 mcg by mouth daily   Last time this was given:  200 mcg on 3/19/2017  6:07 AM                                traMADol 50 MG tablet   Commonly known as:  ULTRAM   Take 0.5 tablets (25 mg) by mouth every 6 hours as needed   Last time this was given:  25 mg on 3/19/2017  5:15 AM                                 TYLENOL PO   Take 650 mg by mouth 4 times daily   Last time this was given:  650 mg on 3/19/2017  9:13 AM                                VITAMIN D (CHOLECALCIFEROL) PO   Take 4,000 Units by mouth daily                                warfarin 1 MG tablet   Commonly known as:  COUMADIN   Take 2 tablets (2 mg) by mouth See Admin Instructions   Last time this was given:  1 mg on 3/18/2017  6:34 PM                                * Notice:  This list has 2 medication(s) that are the same as other medications prescribed for you. Read the directions carefully, and ask your doctor or other care provider to review them with you.

## 2017-03-07 NOTE — PROGRESS NOTES
Brief consult note, full note to follow    Spontaneous estraperitoneal bleeding due to hypercoagulabe state.  Hgb stable, awake and cooperative, tender in lower abdomen.  INR being corrected, IR aware in case he needs angio  No need for surgical intervention for now.  Discussed indication for operation, if needed, with pt. and son.  following

## 2017-03-07 NOTE — IP AVS SNAPSHOT
[place] [facility] [address] [facility]: [phone]                                              INTERAGENCY TRANSFER FORM - PHYSICIAN ORDERS   3/7/2017                    Hospital Admission Date: 3/7/2017  HYUN COTA   : 1926  Sex: Male        Attending Provider: Esteban Voss MD     Allergies:  Codeine, Lisinopril    Infection:  None   Service:  HOSPITALIST    Ht:  1.829 m (6')   Wt:  90.1 kg (198 lb 10.2 oz)   Admission Wt:  87.2 kg (192 lb 3.9 oz)    BMI:  26.94 kg/m 2   BSA:  2.14 m 2            Patient PCP Information     None on File      ED Clinical Impression     Diagnosis Description Comment Added By Time Added    Intraabdominal hemorrhage [R58] Intraabdominal hemorrhage [R58]  Ce Patrick MD 3/19/2017  1:52 PM    Atrial fibrillation, unspecified type (H) [I48.91] Atrial fibrillation, unspecified type (H) [I48.91]  Ce Patrick MD 3/19/2017  1:54 PM    Depression, unspecified depression type [F32.9] Depression, unspecified depression type [F32.9]  Ce Patrick MD 3/19/2017  1:55 PM    Cough [R05] Cough [R05]  Ce Patrick MD 3/19/2017  1:57 PM    Gastroesophageal reflux disease without esophagitis [K21.9] Gastroesophageal reflux disease without esophagitis [K21.9]  Ce Patrick MD 3/19/2017  2:00 PM      Hospital Problems as of 3/19/2017              Priority Class Noted POA    Intraabdominal hemorrhage Medium  3/7/2017 Yes      Non-Hospital Problems as of 3/19/2017     None      Code Status History     Date Active Date Inactive Code Status Order ID Comments User Context    3/19/2017  2:02 PM  Full Code [id number]  Ce Patrick MD Outpatient    3/7/2017  1:25 PM 3/19/2017  2:02 PM Full Code [id number]  Kemi Duong PA-C Inpatient         Medication Review      START taking        Dose / Directions Comments    benzonatate 200 MG capsule   Commonly known as:  TESSALON   Used for:  Cough        Dose:  200 mg   Take 1 capsule (200 mg) by mouth 3 times daily    Quantity:  20 capsule   Refills:  0        digoxin 125 MCG tablet   Commonly known as:  LANOXIN   Used for:  Atrial fibrillation, unspecified type (H)        Dose:  125 mcg   Take 1 tablet (125 mcg) by mouth daily   Quantity:  30 tablet   Refills:  0        mirtazapine 15 MG tablet   Commonly known as:  REMERON   Used for:  Depression, unspecified depression type        Dose:  15 mg   Take 1 tablet (15 mg) by mouth At Bedtime   Quantity:  30 tablet   Refills:  0        pantoprazole 40 MG EC tablet   Commonly known as:  PROTONIX   Used for:  Gastroesophageal reflux disease without esophagitis        Dose:  40 mg   Take 1 tablet (40 mg) by mouth daily Take 30-60 minutes before a meal.   Quantity:  30 tablet   Refills:  1        predniSONE 20 MG tablet   Commonly known as:  DELTASONE   Used for:  Cough        Dose:  40 mg   Take 2 tablets (40 mg) by mouth daily for 5 days   Quantity:  10 tablet   Refills:  0          CONTINUE these medications which may have CHANGED, or have new prescriptions. If we are uncertain of the size of tablets/capsules you have at home, strength may be listed as something that might have changed.        Dose / Directions Comments    metoprolol 100 MG tablet   Commonly known as:  LOPRESSOR   This may have changed:    - medication strength  - how much to take   Used for:  Atrial fibrillation, unspecified type (H)        Dose:  100 mg   Take 1 tablet (100 mg) by mouth 2 times daily   Quantity:  60 tablet   Refills:  0        traMADol 50 MG tablet   Commonly known as:  ULTRAM   This may have changed:  reasons to take this        Dose:  25 mg   Take 0.5 tablets (25 mg) by mouth every 6 hours as needed   Quantity:  28 tablet   Refills:  0        warfarin 1 MG tablet   Commonly known as:  COUMADIN   This may have changed:    - how much to take  - additional instructions   Used for:  Atrial fibrillation, unspecified type (H)        Dose:  2 mg   Take 2 tablets (2 mg) by mouth See Admin Instructions    Quantity:  30 tablet   Refills:  0    Take 2 mg po daily x 2 days and then check INR on Tuesday,3/21/17.  INR goal 2-3.  Indications is afib         CONTINUE these medications which have NOT CHANGED        Dose / Directions Comments    AMOXICILLIN PO        Dose:  2000 mg   Take 2,000 mg by mouth once One hour before dental cleaning   Refills:  0        HYDROXYPROPYL METHYLCELLULOSE OP        Place into both eyes 3 times daily   Refills:  0        K-DUR PO        Dose:  20 mEq   Take 20 mEq by mouth daily   Refills:  0        * LASIX PO        Dose:  40 mg   Take 40 mg by mouth daily   Refills:  0        * FUROSEMIDE PO        Dose:  20 mg   Take 20 mg by mouth daily (with lunch) At 1400   Refills:  0        MAGNESIUM OXIDE PO        Dose:  400 mg   Take 400 mg by mouth At Bedtime   Refills:  0        MECLIZINE HCL PO        Dose:  25 mg   Take 25 mg by mouth every 6 hours as needed for dizziness   Refills:  0        mineral oil-hydrophilic petrolatum        Apply topically 2 times daily as needed for dry skin   Refills:  0        polyethylene glycol Packet   Commonly known as:  MIRALAX/GLYCOLAX   Indication:  if no stool every 4 days        Dose:  1 packet   Take 1 packet by mouth daily as needed for constipation Give every 4 days if no stool   Refills:  0        prochlorperazine 25 MG Suppository   Commonly known as:  COMPAZINE        Dose:  25 mg   Place 25 mg rectally every 12 hours as needed for nausea   Refills:  0        sennosides 8.6 MG tablet   Commonly known as:  SENOKOT        Dose:  1 tablet   Take 1 tablet by mouth At Bedtime   Refills:  0        SYNTHROID PO        Dose:  200 mcg   Take 200 mcg by mouth daily   Refills:  0        TYLENOL PO        Dose:  650 mg   Take 650 mg by mouth 4 times daily   Refills:  0        VITAMIN D (CHOLECALCIFEROL) PO        Dose:  4000 Units   Take 4,000 Units by mouth daily   Refills:  0        * Notice:  This list has 2 medication(s) that are the same as other  medications prescribed for you. Read the directions carefully, and ask your doctor or other care provider to review them with you.      STOP taking     TAMIFLU PO                   Summary of Visit     Reason for your hospital stay       Spontaneous extraperitoneal bleed in context of coumadin coagulopathy.             After Care     Activity - Up ad mauro           Advance Diet as Tolerated       Follow this diet upon discharge:   Combination Diet Regular Diet Adult; Dysphagia Diet Level 2: Mechan Altered; Thin Liquids (water, ice chips, juice, milk gelatin, ice cream, etc)       General info for SNF       Length of Stay Estimate: Long Term Care  Condition at Discharge: Stable  Level of care:skilled   Rehabilitation Potential: Fair  Admission H&P remains valid and up-to-date: Yes  Recent Chemotherapy: N/A  Use Nursing Home Standing Orders: Yes       Mantoux instructions       Give two-step Mantoux (PPD) Per Facility Policy Yes             Referrals     Physical Therapy Adult Consult       Evaluate and treat as clinically indicated.    Reason:  Generalized weakness.             Your next 10 appointments already scheduled     Mar 20, 2017 12:00 PM CDT   Inpatient Meal Follow Up Therapy with Izabella Crowder   [facility] ([facility])    [address], [address]  [facility] [address] [phone]              Follow-Up Appointment Instructions     Future Labs/Procedures    Follow-up and recommended labs and tests      Comments:    PCP at long term SNF next week.      Follow-Up Appointment Instructions     Follow-up and recommended labs and tests        PCP at long term [facility] next week.             Statement of Approval     Ordered          03/19/17 4411  I have reviewed and agree with all the recommendations and orders detailed in this document.  EFFECTIVE NOW     Approved and electronically signed by:  Ce Patrick MD           03/19/17 0939  I have reviewed and agree with all  the recommendations and orders detailed in this document.  EFFECTIVE NOW     Approved and electronically signed by:  Ce Patrick MD

## 2017-03-07 NOTE — PROVIDER NOTIFICATION
MD Notification    Notified Person:  MD    Notified Persons Name: Dinah Duong PA-C    Notification Date/Time: 1:00 PM      Notification Interaction:  Text paged PA    Purpose of Notification: Increase bleeding of the extraperitoneal hematoma  Is showing on  CT scan done here at 1147  Tele showing Afib with UVR at rest - Tachy 120's to 140. BP down to 98/67 at 1225.  Now dropped to 81/50 at 1254.  Writer asking for immediate advisement.  INR 2.48   Platelet count low at 146,000     Orders Received: orders received    Comments:  [facility] IN[facility] of 5.6.  HX being on Coumadin for A-Fib

## 2017-03-07 NOTE — IP AVS SNAPSHOT
` [place] [address] [facility]: [phone]                                              INTERAGENCY TRANSFER FORM - NURSING   3/7/2017                    Hospital Admission Date: 3/7/2017  HYUN OCTA   : 1926  Sex: Male        Attending Provider: Esteban Voss MD     Allergies:  Codeine, Lisinopril    Infection:  None   Service:  HOSPITALIST    Ht:  1.829 m (6')   Wt:  90.1 kg (198 lb 10.2 oz)   Admission Wt:  87.2 kg (192 lb 3.9 oz)    BMI:  26.94 kg/m 2   BSA:  2.14 m 2            Patient PCP Information     None on File      Current Code Status     Date Active Code Status Order ID Comments User Context       Prior      Code Status History     Date Active Date Inactive Code Status Order ID Comments User Context    3/19/2017  2:02 PM  Full Code [id number]  Ce Patrick MD Outpatient    3/7/2017  1:25 PM 3/19/2017  2:02 PM Full Code [id number]  Kemi Duong PA-C Inpatient      Advance Directives        Does patient have a scanned Advance Directive/ACP document in EPIC?           No        Hospital Problems as of 3/19/2017              Priority Class Noted POA    Intraabdominal hemorrhage Medium  3/7/2017 Yes      Non-Hospital Problems as of 3/19/2017     None      Immunizations     Name Date      Pneumococcal (PCV 13) 17          END      ASSESSMENT     Discharge Profile Flowsheet     EXPECTED DISCHARGE     Passing flatus  yes 17 1014    Expected Discharge Date  17 (TCU) 03/15/17 1406   COMMUNICATION ASSESSMENT      DISCHARGE NEEDS ASSESSMENT     Patient's communication style  spoken language (English or Bilingual) 17 0942    Equipment Currently Used at Home  walker, rolling;wheelchair 17 1105   SKIN      # of Referrals Placed by CTS  Post Acute Facilities 17 1531   Inspection  Full 17 1014    GASTROINTESTINAL (ADULT,PEDIATRIC,OB)     Skin WDL  ex 17 1014    GI WDL  ex 17 1014   Skin Color/Characteristics  " bruised (ecchymotic);redness nonblanchable;erin 03/19/17 1014    Abdominal Appearance  rounded 03/19/17 1014   Skin Temperature  warm 03/19/17 0143    All Quadrants Bowel Sounds  hypoactive 03/19/17 0155   Skin Moisture  dry 03/19/17 0143    All Quadrants Palpation  tender 03/17/17 1001   Skin Elasticity  quick return to original state 03/18/17 2211    LLQ Palpation  tender 03/19/17 1014   Skin Integrity  bruise(s);scab(s) (R foot toes) 03/19/17 1014    RLQ Palpation  tender 03/19/17 1014   SAFETY      Last Bowel Movement  03/11/17 03/14/17 1338   Safety WDL  WDL 03/19/17 1014    GI Signs/Symptoms  abdominal pain 03/19/17 1014                      Assessment WDL (Within Defined Limits) Definitions           Safety WDL     Effective: 09/28/15    Row Information: <b>WDL Definition:</b> Bed in low position, wheels locked; call light in reach; upper side rails up x 2; ID band on<br> <font color=\"gray\"><i>Item=AS safety wdl>>List=AS safety wdl>>Version=F14</i></font>      Skin WDL     Effective: 09/28/15    Row Information: <b>WDL Definition:</b> Warm; dry; intact; elastic; without discoloration; pressure points without redness<br> <font color=\"gray\"><i>Item=AS skin wdl>>List=AS skin wdl>>Version=F14</i></font>      Vitals     Vital Signs Flowsheet     QUICK ADDS     Pain Orientation  Right;Left 03/19/17 0651    Quick Adds  Comments 03/13/17 0925   Pain Descriptors  Aching 03/19/17 0651    COMMENTS     Pain Intervention(s)  Medication (See eMAR) 03/19/17 1008    Comments  At rest prior to gait with PT 03/13/17 0925   Response to Interventions  Relief 03/19/17 1008    VITAL SIGNS     ANALGESIA SIDE EFFECTS MONITORING      Temp  97.5  F (36.4  C) 03/19/17 0903   Side Effects Monitoring: Respiratory Quality  R 03/19/17 1008    Temp src  Oral 03/19/17 0903   Side Effects Monitoring: Respiratory Depth  N 03/19/17 1008    Resp  16 03/19/17 1008   Side Effects Monitoring: Sedation Level  1 03/19/17 1008    Pulse  89 03/19/17 " 0903   HEIGHT AND WEIGHT      Heart Rate  89 03/19/17 0903   Height  1.829 m (6') 03/10/17 0944    Pulse/Heart Rate Source  Monitor 03/19/17 0903   Weight  90.1 kg (198 lb 10.2 oz) 03/19/17 0619    BP  113/63 03/19/17 0904   POSITIONING      BP Location  Right arm 03/19/17 0904   Body Position  lower extremity elevated, left;lower extremity elevated, right 03/19/17 0904    OXYGEN THERAPY     Head of Bed (HOB)  HOB lowered 03/19/17 0904    SpO2  95 % 03/19/17 1144   Positioning/Transfer Devices  pillows 03/19/17 0142    O2 Device  None (Room air) 03/19/17 1144   Chair  -- 03/13/17 2121    Oxygen Delivery  1 LPM 03/19/17 0717   DAILY CARE      PAIN/COMFORT     Activity Type  up in chair 03/19/17 1426    Patient Currently in Pain  denies 03/19/17 1008   Activity Level of Assistance  assistance, 1 person 03/19/17 1426    Preferred Pain Scale  number (Numeric Rating Pain Scale) 03/19/17 1008   Activity Assistive Device  gait belt;walker 03/19/17 1426    Patient's Stated Pain Goal  No pain 03/19/17 1008   ECG      0-10 Pain Scale  5 03/19/17 0913   ECG Rhythm  Atrial fibrillation 03/17/17 0224    FACES Pain Rating  4-->hurts little more 03/14/17 0213   Ectopy  None 03/14/17 0130    Pain Location  Abdomen 03/19/17 1008   Equipment  electrodes changed;telemetry batteries changed 03/11/17 0936            Patient Lines/Drains/Airways Status    Active LINES/DRAINS/AIRWAYS     Name: Placement date: Placement time: Site: Days: Last dressing change:    Peripheral IV 03/13/17 Left Upper forearm 03/13/17   0238   Upper forearm   6     Pressure Injury 03/15/17 Medial Coccyx nonblanchable erythema, 2 pea sized open areas 03/15/17   0830    4             Patient Lines/Drains/Airways Status    Active PICC/CVC     None            Intake/Output Detail Report     Date Intake         Output Net    Shift P.O. I.V. IV Piggyback Plasma Blood Components Total Urine Total       Noc 03/17/17 2300 - 03/18/17 0659 960 -- -- -- -- 960 275 210 998     Day 03/18/17 0700 - 03/18/17 1459 480 -- -- -- -- 480 450 450 30    Jocelin 03/18/17 1500 - 03/18/17 2259 -- -- -- -- -- -- 500 500 -500    Noc 03/18/17 2300 - 03/19/17 0659 -- 73 -- -- -- 73 -- -- 73    Day 03/19/17 0700 - 03/19/17 1459 -- -- -- -- -- -- 175 175 -175      Last Void/BM       Most Recent Value    Urine Occurrence 1 at 03/19/2017 0900    Stool Occurrence 1 at 03/16/2017 1732      Case Management/Discharge Planning     Case Management/Discharge Planning Flowsheet     REFERRAL INFORMATION     ASSESSMENT OF FAMILY/SOCIAL SUPPORT      Did the Initial Social Work Assessment result in a Social Work Case?  Yes 03/16/17 1531   Who is your support system?  Children 03/16/17 1531    Admission Type  inpatient 03/16/17 1531   Description of Support System  Supportive 03/16/17 1531    Arrived From  home or self-care 03/16/17 1531   Support Assessment  Adequate social supports;Adequate family and caregiver support 03/16/17 1531    Referral Source  physician 03/16/17 1531   Quality of Family Relationships  supportive;involved 03/16/17 1531    # of Referrals Placed by CTS  Post Acute Facilities 03/16/17 1531   COPING/STRESS      Post Acute Facilities  SNF 03/16/17 1531   Major Change/Loss/Stressor  denies 03/08/17 1349    Reason For Consult  discharge planning 03/16/17 1531   EXPECTED DISCHARGE      Record Reviewed  clinical discipline documentation;history and physical;medical record;patient profile;plan of care 03/16/17 1531   Expected Discharge Date  03/16/17 (TCU) 03/15/17 1406     Assigned to Case  Shannan Peanloza 03/16/17 1531   FINAL RESOURCES      LIVING ENVIRONMENT     Equipment Currently Used at Home  walker, rolling;wheelchair 03/09/17 1105    Lives With  facility resident 03/16/17 1531   ABUSE RISK SCREEN      Living Arrangements  extended care facility 03/16/17 1531   QUESTION TO PATIENT:  Has a member of your family or a partner(now or in the past) intimidated, hurt, manipulated, or  controlled you in any way?  no 03/07/17 1000    Quality Of Family Relationships  supportive 03/16/17 1531   QUESTION TO PATIENT: Do you feel safe going back to the place where you are living?  yes 03/07/17 1000    Able to Return to Prior Living Arrangements  yes 03/16/17 1531   OBSERVATION: Is there reason to believe there has been maltreatment of a vulnerable adult (ie. Physical/Sexual/Emotional abuse, self neglect, lack of adequate food, shelter, medical care, or financial exploitation)?  no 03/07/17 1000    HOME SAFETY     (R) MENTAL HEALTH SUICIDE RISK      Patient Feels Safe Living in Home?  yes 03/16/17 1531   Are you depressed or being treated for depression?  No 03/07/17 1000

## 2017-03-08 ENCOUNTER — APPOINTMENT (OUTPATIENT)
Dept: GENERAL RADIOLOGY | Facility: CLINIC | Age: 82
DRG: 813 | End: 2017-03-08
Attending: INTERNAL MEDICINE
Payer: COMMERCIAL

## 2017-03-08 LAB
BASOPHILS # BLD AUTO: 0 10E9/L (ref 0–0.2)
BASOPHILS NFR BLD AUTO: 0.1 %
DIFFERENTIAL METHOD BLD: ABNORMAL
EOSINOPHIL # BLD AUTO: 0 10E9/L (ref 0–0.7)
EOSINOPHIL NFR BLD AUTO: 0 %
ERYTHROCYTE [DISTWIDTH] IN BLOOD BY AUTOMATED COUNT: 14.8 % (ref 10–15)
GLUCOSE BLDC GLUCOMTR-MCNC: 103 MG/DL (ref 70–99)
GLUCOSE BLDC GLUCOMTR-MCNC: 111 MG/DL (ref 70–99)
GLUCOSE BLDC GLUCOMTR-MCNC: 117 MG/DL (ref 70–99)
HBA1C MFR BLD: 5.7 % (ref 4.3–6)
HCT VFR BLD AUTO: 31.7 % (ref 40–53)
HGB BLD-MCNC: 11 G/DL (ref 13.3–17.7)
HGB BLD-MCNC: 11 G/DL (ref 13.3–17.7)
HGB BLD-MCNC: 11.6 G/DL (ref 13.3–17.7)
IMM GRANULOCYTES # BLD: 0 10E9/L (ref 0–0.4)
IMM GRANULOCYTES NFR BLD: 0.3 %
INR PPP: 1.17 (ref 0.86–1.14)
LYMPHOCYTES # BLD AUTO: 1 10E9/L (ref 0.8–5.3)
LYMPHOCYTES NFR BLD AUTO: 13.2 %
MCH RBC QN AUTO: 34.5 PG (ref 26.5–33)
MCHC RBC AUTO-ENTMCNC: 34.7 G/DL (ref 31.5–36.5)
MCV RBC AUTO: 99 FL (ref 78–100)
MONOCYTES # BLD AUTO: 0.6 10E9/L (ref 0–1.3)
MONOCYTES NFR BLD AUTO: 7.4 %
NEUTROPHILS # BLD AUTO: 6.1 10E9/L (ref 1.6–8.3)
NEUTROPHILS NFR BLD AUTO: 79 %
NRBC # BLD AUTO: 0 10*3/UL
NRBC BLD AUTO-RTO: 0 /100
PLATELET # BLD AUTO: 176 10E9/L (ref 150–450)
RBC # BLD AUTO: 3.19 10E12/L (ref 4.4–5.9)
WBC # BLD AUTO: 7.7 10E9/L (ref 4–11)

## 2017-03-08 PROCEDURE — 71010 XR CHEST PORT 1 VW: CPT

## 2017-03-08 PROCEDURE — 36415 COLL VENOUS BLD VENIPUNCTURE: CPT | Performed by: HOSPITALIST

## 2017-03-08 PROCEDURE — 85018 HEMOGLOBIN: CPT | Performed by: INTERNAL MEDICINE

## 2017-03-08 PROCEDURE — 99233 SBSQ HOSP IP/OBS HIGH 50: CPT | Performed by: INTERNAL MEDICINE

## 2017-03-08 PROCEDURE — 25000125 ZZHC RX 250: Performed by: PHYSICIAN ASSISTANT

## 2017-03-08 PROCEDURE — 25000132 ZZH RX MED GY IP 250 OP 250 PS 637: Performed by: INTERNAL MEDICINE

## 2017-03-08 PROCEDURE — 25000132 ZZH RX MED GY IP 250 OP 250 PS 637: Performed by: SURGERY

## 2017-03-08 PROCEDURE — 36415 COLL VENOUS BLD VENIPUNCTURE: CPT | Performed by: INTERNAL MEDICINE

## 2017-03-08 PROCEDURE — 83036 HEMOGLOBIN GLYCOSYLATED A1C: CPT | Performed by: HOSPITALIST

## 2017-03-08 PROCEDURE — 25000125 ZZHC RX 250: Performed by: INTERNAL MEDICINE

## 2017-03-08 PROCEDURE — 12000000 ZZH R&B MED SURG/OB

## 2017-03-08 PROCEDURE — 85018 HEMOGLOBIN: CPT | Performed by: HOSPITALIST

## 2017-03-08 PROCEDURE — 25000125 ZZHC RX 250: Performed by: HOSPITALIST

## 2017-03-08 PROCEDURE — 00000146 ZZHCL STATISTIC GLUCOSE BY METER IP

## 2017-03-08 PROCEDURE — 25000128 H RX IP 250 OP 636: Performed by: SURGERY

## 2017-03-08 PROCEDURE — 85025 COMPLETE CBC W/AUTO DIFF WBC: CPT | Performed by: HOSPITALIST

## 2017-03-08 PROCEDURE — 85610 PROTHROMBIN TIME: CPT | Performed by: HOSPITALIST

## 2017-03-08 PROCEDURE — 25000132 ZZH RX MED GY IP 250 OP 250 PS 637: Performed by: PHYSICIAN ASSISTANT

## 2017-03-08 RX ORDER — OSELTAMIVIR PHOSPHATE 30 MG/1
30 CAPSULE ORAL DAILY
Status: COMPLETED | OUTPATIENT
Start: 2017-03-09 | End: 2017-03-15

## 2017-03-08 RX ORDER — METOPROLOL TARTRATE 50 MG
50 TABLET ORAL 2 TIMES DAILY
Status: DISCONTINUED | OUTPATIENT
Start: 2017-03-08 | End: 2017-03-09

## 2017-03-08 RX ORDER — FUROSEMIDE 10 MG/ML
10 INJECTION INTRAMUSCULAR; INTRAVENOUS ONCE
Status: COMPLETED | OUTPATIENT
Start: 2017-03-08 | End: 2017-03-08

## 2017-03-08 RX ADMIN — METOPROLOL TARTRATE 5 MG: 5 INJECTION INTRAVENOUS at 19:39

## 2017-03-08 RX ADMIN — METOPROLOL TARTRATE 50 MG: 50 TABLET, FILM COATED ORAL at 20:15

## 2017-03-08 RX ADMIN — Medication 25 MG: at 15:07

## 2017-03-08 RX ADMIN — METOPROLOL TARTRATE 2.5 MG: 5 INJECTION INTRAVENOUS at 01:53

## 2017-03-08 RX ADMIN — METOPROLOL TARTRATE 37.5 MG: 25 TABLET, FILM COATED ORAL at 06:23

## 2017-03-08 RX ADMIN — GUAIFENESIN 10 ML: 100 SOLUTION ORAL at 20:15

## 2017-03-08 RX ADMIN — LIDOCAINE HYDROCHLORIDE 10 ML: 20 JELLY TOPICAL at 04:50

## 2017-03-08 RX ADMIN — FUROSEMIDE 10 MG: 10 INJECTION, SOLUTION INTRAVENOUS at 05:44

## 2017-03-08 RX ADMIN — METOPROLOL TARTRATE 2.5 MG: 5 INJECTION INTRAVENOUS at 07:37

## 2017-03-08 RX ADMIN — METOPROLOL TARTRATE 12.5 MG: 25 TABLET, FILM COATED ORAL at 11:51

## 2017-03-08 RX ADMIN — METOPROLOL TARTRATE 2.5 MG: 5 INJECTION INTRAVENOUS at 16:39

## 2017-03-08 RX ADMIN — LEVOTHYROXINE SODIUM 200 MCG: 100 TABLET ORAL at 07:37

## 2017-03-08 ASSESSMENT — PAIN DESCRIPTION - DESCRIPTORS
DESCRIPTORS: SHARP
DESCRIPTORS: SHARP

## 2017-03-08 NOTE — PROVIDER NOTIFICATION
MD Notification    Notified Person:  Tele Hub    Notified Persons Name: Tele Hub RN Ally    Notification Date/Time: 03/08/2016 04:57    Notification Interaction:  Talked with tele RN    Purpose of Notification: Pt's HR uncontrolled, 120s-160s.    Orders Received: Increased metoprolol dose, lab to draw BMP    Comments:

## 2017-03-08 NOTE — PROGRESS NOTES
[facility]  Hospitalist Progress Note  Charlene Augustin MD  03/08/2017    Assessment & Plan   Mason Gonzalez is a 89 year old male with past medical history of atrial fibrillation on chronic anticoagulation, hypothyroidism and diet-controlled type 2 diabetes who presented from outside hospital for evaluation of an extraperitoneal hemorrhage.       Extraperitoneal hemorrhage  This is likely spontaneous bleed secondary to coagulopathy in the context of warfarin use.    The patient had a CT scan at outside facility which showed an 8.4 cm extraperitoneal hemorrhage in the right pelvis surrounding the bladder. Hemoglobin was stable at 13.6; however, INR was elevated at 5.8. S/p vitamin K at [facility].   Repeat CT abd/pelvis shows increased size of extraperitoneal hematoma along the anterior aspect of the bladder which was measuring 11.6.   - received Kcentra, viamink and FFP upon admission  - serial hemoglobin down trended, but stable around 11  - surgery following, appreciate help, no plan for sugical interavention at this time  - pain control: IV dilaudid prn, will also order tramadol prn from PTA meds    Acute blood loss anemia:  - secondary to above  - hgb 13 at outside hospital, down to 11 this am  - sharep hemoglobin checks to q12 hrs    Atrial fibrillation with RVR  This is likely secondary to missed metoprolol dose as well as worsened due to acute pain.   - INR has been reversed as above, 1.17 this am  - Metoprolol restarted 37.5mg BID this morning, HR better 110s  - will give additional 12.5mg this afternoon and increase dose to 50mg bid tonight per PTA dose  - Metoprolol IV prn for sustained HR >120  - holding warfarin due to above    Hypothyroidism. Continue prior to admission Synthroid.     Congestive heart failure, unspecified. The patient has documentation of CHF in [facility] past medical [facility]. I have no echocardiograms available.   - d/c IVF since wheezing on exam  - has  also received Lasix this AM  - will check CXR today  - could consider resuming Lasix in 1-2 days if remains hemodynamically stable  - monitor I & Os    Obstructive sleep apnea, does not use CPAP.     Influenza prophylaxis. The patient was initiated on Tamiflu prophylaxis at long-term [facility] for 10 days. We will continue.     Communications: discussed with RN    Deep venous thrombosis prophylaxis: PCDs.       CODE STATUS: Full    Dispo: will transfer out of ICU. Anticipate 2-3 days pending stable hemoglobin and controlled heart rate.     Interval History   Complains of some lower abdominal discomfort. He wants to take something more stronger than tylenol. He wants to eat.   He is in A. Fib with RVR, metoprolol PO received this AM and HR improving. Denies palpitation or lightheadedness. Complains of mild shortness of breath. BP has remained stable.     -Data reviewed today: I reviewed all new labs and imaging over the last 24 hours. I personally reviewed no images or EKG's today.    Physical Exam   Heart Rate: 107, Blood pressure 128/87, pulse 121, temperature 97.7  F (36.5  C), temperature source Oral, resp. rate 20, weight 87.2 kg (192 lb 3.9 oz), SpO2 99 %.  Vitals:    03/07/17 1300   Weight: 87.2 kg (192 lb 3.9 oz)     Vital Signs with Ranges  Temp:  [96.8  F (36  C)-98.7  F (37.1  C)] 97.7  F (36.5  C)  Pulse:  [] 121  Heart Rate:  [107-163] 107  Resp:  [11-42] 20  BP: ()/(42-97) 128/87  SpO2:  [82 %-100 %] 99 %  I/O's Last 24 hours  I/O last 3 completed shifts:  In: 1400 [I.V.:1400]  Out: 395 [Urine:395]    Constitutional: Alert, awake and no apparent distress   Respiratory: Bibasilar crackles, diffuse expiratory wheezing  Cardiovascular: irregular, tachycardic  Abdomen: soft, non-distended, +diffuse tenderness greater in the lower abdomen  Skin/Integumen: small ecchymosis noted right lower abdomen      Medications   All medications I am responsible for were reviewed.    NaCl 100 mL/hr at  03/08/17 0800       metoprolol  37.5 mg Oral BID     pneumococcal  0.5 mL Intramuscular Prior to discharge     sodium chloride (PF)  3 mL Intracatheter Q8H     insulin aspart  1-7 Units Subcutaneous TID AC     insulin aspart  1-5 Units Subcutaneous At Bedtime     levothyroxine (SYNTHROID/LEVOTHROID) tablet 200 mcg  200 mcg Oral Daily     oseltamivir (TAMIFLU) capsule 30 mg  30 mg Oral Daily     sennosides  1 tablet Oral At Bedtime        Data     Recent Labs  Lab 03/08/17  0600 03/08/17  0150 03/07/17  2150 03/07/17  1820 03/07/17  1615  03/07/17  0950   WBC 7.7  --   --   --   --   --  5.5   HGB 11.0* 11.6* 12.0*  --  12.5*  < > 13.4   MCV 99  --   --   --   --   --  100     --   --   --   --   --  146*   INR 1.17*  --   --  1.22* 1.26*  --  2.48*   NA  --   --   --   --   --   --  137   POTASSIUM  --   --   --   --   --   --  3.5   CHLORIDE  --   --   --   --   --   --  102   CO2  --   --   --   --   --   --  26   BUN  --   --   --   --   --   --  27   CR  --   --   --   --   --   --  1.20   ANIONGAP  --   --   --   --   --   --  9   SARAH  --   --   --   --   --   --  8.2*   GLC  --   --   --   --   --   --  99   < > = values in this interval not displayed.    Recent Results (from the past 24 hour(s))   CT Abdomen Pelvis w/o Contrast    Narrative    CT ABDOMEN AND PELVIS WITHOUT CONTRAST   3/7/2017 11:47 AM     HISTORY: Follow-up extraperitoneal hemorrhage adjacent to the bladder.    TECHNIQUE: Volumetric helical sections were acquired from the lung  bases through the ischial tuberosities without IV contrast. Coronal  images were also reconstructed. Radiation dose for this scan was  reduced using automated exposure control, adjustment of the mA and/or  kV according to patient size, or iterative reconstruction technique.    COMPARISON: Outside contrast-enhanced CT of the abdomen and pelvis  performed earlier today.    FINDINGS: The previously described extraperitoneal hematoma anterior  to the urinary bladder  has increased in size, now measuring 11.2 x  11.6 x 10.6 cm (previously by my measurement 7.5 x 6 x 6.2 cm). Higher  density blood products within this hematoma are noted to layer  dependently. High density material within the collecting systems,  ureters, and decompressed urinary bladder is consistent with excreted  contrast material related to the administration of IV contrast for the  CT scan performed earlier today. There is mild prostatic enlargement.  Advanced atherosclerotic aortoiliac calcification. No bowel  obstruction. No convincing evidence for colitis or diverticulitis. Few  small bilateral renal cysts are unchanged. Scattered small calcified  granulomas in the spleen, prior cholecystectomy. The liver, adrenal  glands, and pancreas have unremarkable noncontrast appearances. Small  hiatal hernia. Coronary artery calcification. Few small calcified  granulomas are noted in the left lower lobe of the lung. Mild fibrotic  changes are noted about the periphery of both lung bases, greater on  the left. Mild bilateral gynecomastia. Right total hip arthroplasty.  Diffuse osteopenia. Old fractures of the right superior and inferior  pubic rami are again noted. There are advanced degenerative changes in  the visualized thoracolumbar spine. There is mild anterior compression  of the L2 vertebral body.      Impression    IMPRESSION:   1. An extraperitoneal hematoma along the anterior aspect of the  urinary bladder has increased in size since the outside exam performed  earlier today, now measuring up to 11.6 cm in greatest dimension.  2. Mild prostatic enlargement.  3. Mild fibrotic changes about the periphery of both lung bases.          CRUZ JIMENES MD

## 2017-03-08 NOTE — PROGRESS NOTES
General Surgery Progress Note    Happy to hear he will not be having surgery, says he is doing ok.   Vitals: Blood pressure 128/87, pulse 121, temperature 97.7  F (36.5  C), temperature source Oral, resp. rate 20, weight 192 lb 3.9 oz (87.2 kg), SpO2 99 %. Afib.   Temperature Temp  Av.9  F (36.6  C)  Min: 96.8  F (36  C)  Max: 98.7  F (37.1  C)   I/O last 3 completed shifts:  In: 1400 [I.V.:1400]  Out: 395 [Urine:395]    Gen: Sleeping, easily awakened  Abd: soft, nt, nd. R groin: ecchymosis, hematoma soft, nt.    Recent Labs   Lab Test  17   0600  17   0150  17   2150   17   0950   WBC  7.7   --    --    --   5.5   HGB  11.0*  11.6*  12.0*   < >  13.4   HCT  31.7*   --    --    --   38.7*   PLT  176   --    --    --   146*    < > = values in this interval not displayed.      89 year old male S/P Procedure(s):  Extraperitoneal hematoma secondary to hypercoagulable state  - Hgb stable, continue to monitor  - Continue ICU cares  - No plans for surgical intervention at this time.     Keiko Johns PA-C  [facility]  [phone]

## 2017-03-08 NOTE — PROGRESS NOTES
Tele ICU    Called re tachycardia - Afib with RVR  Reviewed home meds  Restarted Metoprolol (slightly lower dose than home dose)  Also on lasix - giving small IV dose now    Lawanda Torre

## 2017-03-08 NOTE — PLAN OF CARE
Problem: Goal Outcome Summary  Goal: Goal Outcome Summary  Outcome: No Change  VSS. Tele AFib RVR (120s-160s). Maintaining sats on 2L NC. Infrequent cough, productive. Complains of abdominal pain when coughing. No changes to abdomen, soft. UOP red, thinking trauma on insertion is cause. Pt pleasant AxO x4 when awake, when waking up from sleep is slightly confused, agitated and needs frequent reenforcement. Tried to exit bed twice overnight. Continue monitoring, possible OR today.

## 2017-03-09 ENCOUNTER — APPOINTMENT (OUTPATIENT)
Dept: PHYSICAL THERAPY | Facility: CLINIC | Age: 82
DRG: 813 | End: 2017-03-09
Attending: INTERNAL MEDICINE
Payer: COMMERCIAL

## 2017-03-09 LAB
ANION GAP SERPL CALCULATED.3IONS-SCNC: 9 MMOL/L (ref 3–14)
BUN SERPL-MCNC: 30 MG/DL (ref 7–30)
CALCIUM SERPL-MCNC: 8.2 MG/DL (ref 8.5–10.1)
CHLORIDE SERPL-SCNC: 102 MMOL/L (ref 94–109)
CO2 SERPL-SCNC: 25 MMOL/L (ref 20–32)
CREAT SERPL-MCNC: 1.06 MG/DL (ref 0.66–1.25)
ERYTHROCYTE [DISTWIDTH] IN BLOOD BY AUTOMATED COUNT: 14.8 % (ref 10–15)
GFR SERPL CREATININE-BSD FRML MDRD: 66 ML/MIN/1.7M2
GLUCOSE BLDC GLUCOMTR-MCNC: 105 MG/DL (ref 70–99)
GLUCOSE BLDC GLUCOMTR-MCNC: 111 MG/DL (ref 70–99)
GLUCOSE SERPL-MCNC: 87 MG/DL (ref 70–99)
HCT VFR BLD AUTO: 31.4 % (ref 40–53)
HGB BLD-MCNC: 10.7 G/DL (ref 13.3–17.7)
INR PPP: 1.23 (ref 0.86–1.14)
MCH RBC QN AUTO: 34.3 PG (ref 26.5–33)
MCHC RBC AUTO-ENTMCNC: 34.1 G/DL (ref 31.5–36.5)
MCV RBC AUTO: 101 FL (ref 78–100)
PLATELET # BLD AUTO: 209 10E9/L (ref 150–450)
POTASSIUM SERPL-SCNC: 3.8 MMOL/L (ref 3.4–5.3)
RBC # BLD AUTO: 3.12 10E12/L (ref 4.4–5.9)
SODIUM SERPL-SCNC: 136 MMOL/L (ref 133–144)
WBC # BLD AUTO: 7.3 10E9/L (ref 4–11)

## 2017-03-09 PROCEDURE — 25000307 HC RX OP HPI UCR WEL IP 250: Performed by: INTERNAL MEDICINE

## 2017-03-09 PROCEDURE — 12000000 ZZH R&B MED SURG/OB

## 2017-03-09 PROCEDURE — 25000132 ZZH RX MED GY IP 250 OP 250 PS 637: Performed by: HOSPITALIST

## 2017-03-09 PROCEDURE — 25000132 ZZH RX MED GY IP 250 OP 250 PS 637: Performed by: PHYSICIAN ASSISTANT

## 2017-03-09 PROCEDURE — 40000275 ZZH STATISTIC RCP TIME EA 10 MIN

## 2017-03-09 PROCEDURE — 25000132 ZZH RX MED GY IP 250 OP 250 PS 637: Performed by: INTERNAL MEDICINE

## 2017-03-09 PROCEDURE — 36415 COLL VENOUS BLD VENIPUNCTURE: CPT | Performed by: HOSPITALIST

## 2017-03-09 PROCEDURE — 94640 AIRWAY INHALATION TREATMENT: CPT

## 2017-03-09 PROCEDURE — 99212 OFFICE O/P EST SF 10 MIN: CPT

## 2017-03-09 PROCEDURE — 40000193 ZZH STATISTIC PT WARD VISIT

## 2017-03-09 PROCEDURE — 85610 PROTHROMBIN TIME: CPT | Performed by: HOSPITALIST

## 2017-03-09 PROCEDURE — 25000125 ZZHC RX 250: Performed by: INTERNAL MEDICINE

## 2017-03-09 PROCEDURE — 25000128 H RX IP 250 OP 636: Performed by: INTERNAL MEDICINE

## 2017-03-09 PROCEDURE — 00000146 ZZHCL STATISTIC GLUCOSE BY METER IP

## 2017-03-09 PROCEDURE — 97161 PT EVAL LOW COMPLEX 20 MIN: CPT | Mod: GP

## 2017-03-09 PROCEDURE — 99233 SBSQ HOSP IP/OBS HIGH 50: CPT | Performed by: INTERNAL MEDICINE

## 2017-03-09 PROCEDURE — 85027 COMPLETE CBC AUTOMATED: CPT | Performed by: INTERNAL MEDICINE

## 2017-03-09 PROCEDURE — 80048 BASIC METABOLIC PNL TOTAL CA: CPT | Performed by: INTERNAL MEDICINE

## 2017-03-09 PROCEDURE — 36415 COLL VENOUS BLD VENIPUNCTURE: CPT | Performed by: INTERNAL MEDICINE

## 2017-03-09 RX ORDER — METOPROLOL TARTRATE 25 MG/1
25 TABLET, FILM COATED ORAL ONCE
Status: COMPLETED | OUTPATIENT
Start: 2017-03-09 | End: 2017-03-09

## 2017-03-09 RX ORDER — LEVALBUTEROL 1.25 MG/.5ML
1.25 SOLUTION, CONCENTRATE RESPIRATORY (INHALATION) EVERY 4 HOURS PRN
Status: DISCONTINUED | OUTPATIENT
Start: 2017-03-09 | End: 2017-03-19 | Stop reason: HOSPADM

## 2017-03-09 RX ORDER — WARFARIN SODIUM 3 MG/1
3 TABLET ORAL
Status: COMPLETED | OUTPATIENT
Start: 2017-03-09 | End: 2017-03-09

## 2017-03-09 RX ORDER — FUROSEMIDE 40 MG
40 TABLET ORAL DAILY
Status: DISCONTINUED | OUTPATIENT
Start: 2017-03-09 | End: 2017-03-09

## 2017-03-09 RX ORDER — FUROSEMIDE 10 MG/ML
40 INJECTION INTRAMUSCULAR; INTRAVENOUS
Status: COMPLETED | OUTPATIENT
Start: 2017-03-09 | End: 2017-03-10

## 2017-03-09 RX ORDER — METOPROLOL TARTRATE 100 MG
100 TABLET ORAL 2 TIMES DAILY
Status: DISCONTINUED | OUTPATIENT
Start: 2017-03-09 | End: 2017-03-19 | Stop reason: HOSPADM

## 2017-03-09 RX ADMIN — GUAIFENESIN 10 ML: 100 SOLUTION ORAL at 21:52

## 2017-03-09 RX ADMIN — OSELTAMIVIR PHOSPHATE 30 MG: 30 CAPSULE ORAL at 08:17

## 2017-03-09 RX ADMIN — Medication 25 MG: at 08:17

## 2017-03-09 RX ADMIN — Medication 25 MG: at 17:59

## 2017-03-09 RX ADMIN — LEVOTHYROXINE SODIUM 200 MCG: 100 TABLET ORAL at 06:38

## 2017-03-09 RX ADMIN — Medication 25 MG: at 01:41

## 2017-03-09 RX ADMIN — GUAIFENESIN 10 ML: 100 SOLUTION ORAL at 17:54

## 2017-03-09 RX ADMIN — FUROSEMIDE 40 MG: 40 TABLET ORAL at 09:49

## 2017-03-09 RX ADMIN — LEVALBUTEROL HYDROCHLORIDE 1.25 MG: 1.25 SOLUTION, CONCENTRATE RESPIRATORY (INHALATION) at 13:18

## 2017-03-09 RX ADMIN — METOPROLOL TARTRATE 5 MG: 5 INJECTION INTRAVENOUS at 05:10

## 2017-03-09 RX ADMIN — WARFARIN SODIUM 3 MG: 3 TABLET ORAL at 21:52

## 2017-03-09 RX ADMIN — FUROSEMIDE 40 MG: 10 INJECTION, SOLUTION INTRAVENOUS at 17:29

## 2017-03-09 RX ADMIN — ACETAMINOPHEN 650 MG: 325 TABLET, FILM COATED ORAL at 21:52

## 2017-03-09 RX ADMIN — METOPROLOL TARTRATE 50 MG: 50 TABLET, FILM COATED ORAL at 08:17

## 2017-03-09 RX ADMIN — METOPROLOL TARTRATE 25 MG: 25 TABLET, FILM COATED ORAL at 09:49

## 2017-03-09 RX ADMIN — METOPROLOL TARTRATE 100 MG: 100 TABLET, FILM COATED ORAL at 17:59

## 2017-03-09 NOTE — PROGRESS NOTES
[facility] Nurse Inpatient Adult Pressure Injury (PI) Assessment     Initial Assessment of PI(s) on pt's: Rt coccyx/fleshy  buttock area - community acquired    Data:   Patient History:     Mason Gonzalez is a 89 year old male with past medical history of atrial fibrillation on chronic anticoagulation, hypothyroidism and diet-controlled type 2 diabetes who presented from outside hospital for evaluation of an extraperitoneal hemorrhage.         Current mattress:  AtmosAir  Current pressure relieving devices:  Pillows    Moisture Management:  Incontinence Protocol      Current Diet / Nutrition:     Active Diet Order      Combination Diet 5410-9232 Calories: Moderate Consistent CHO (4-6 CHO units/meal); 2 gm NA Diet      Puma Assessment and sub scores:   Puma Score  Avg: 15.5  Min: 14  Max: 17     Labs:   Recent Labs   Lab Test  03/09/17   0745   03/08/17   0600   HGB  10.7*   < >  11.0*   INR   --    --   1.17*   WBC  7.3   --   7.7   A1C   --    --   5.7    < > = values in this interval not displayed.                                                                                                                          Pressure Injury Assessment  (location #1: Rt coccyx/inner fleshy buttocks  Wound Base: 2.0cm x 2.0cm x scattered scabbed area with blanchable erythema    Tunneling:  N/A    Undermining: N/A    Palpation of the wound bed:  normal    Slough appearance:  none    Eschar appearance:  none    Periwound Skin: 4.0cm x 5.0cm area of blanchable erythema      Temperature  normal     Drainage:   Amount: none     Odor: none    Pain:  minimal          Intervention:     Patient's chart evaluated.      Puma Interventions:  Current Puma Interventions and Care Plan reviewed and updated, appropriate at this time.    Wound was assessed.    Wound Care: was done: Removal of existing dressing                                                 Cleansing with MicroKlenz solution                      "                             Application of mepilex border,    Orders  In Epic    Supplies  In floor supply room    Discussed plan of care with Nursing           Assessment:    Small area of unstageable PI with surrounding blanchable erythema - community acquired. No local s/s infection         Plan:     Nursing to notify the Provider(s) and re-consult the WOC Nurse if wound(s) deteriorate(s)or if the wound care plan needs reevaluation.    If pt is refusing to turn or reposition they must be educated on the  potential injury from not off loading pressure.  Then this \"educated refusal\" needs to be documented as an \"educated refusal to turn/ reposition\" and document if alert, etc.    Rt coccyx/fleshy  buttock area PI:     -change dressing on odd days and prn     -Mepilex border    WOC Nurse will return: weekly and prn  Face to face time: 15 minutes    "

## 2017-03-09 NOTE — PROGRESS NOTES
03/09/17 1045   Quick Adds   Type of Visit Initial PT Evaluation   Living Environment   Lives With facility resident   Living Arrangements extended care facility  ([facility])   Home Accessibility no concerns   Number of Stairs to Enter Home 0   Number of Stairs Within Home 0   Self-Care   Usual Activity Tolerance good   Current Activity Tolerance fair   Regular Exercise no   Equipment Currently Used at Home walker, rolling;wheelchair   Activity/Exercise/Self-Care Comment Pt unable to give much information. Staff at LT states pt ambulates up too approx 500 ft to the dining room with FWW, otherwise will also self propell in his wc. Is independent with transfers. Needs cues for ADLs.   Functional Level Prior   Ambulation 1-->assistive equipment   Transferring 1-->assistive equipment   Fall history within last six months no   Which of the above functional risks had a recent onset or change? ambulation;transferring   General Information   Onset of Illness/Injury or Date of Surgery - Date 03/07/17   Referring Physician Charlene Augustin MD   Patient/Family Goals Statement return home   Pertinent History of Current Problem (include personal factors and/or comorbidities that impact the POC) [facility], high INR, afib with RVR. PMH: DM2, colon cancer with colectomyh, spinal stenosis, afib, CHF.   Precautions/Limitations fall precautions   Weight-Bearing Status - LLE full weight-bearing   Weight-Bearing Status - RLE full weight-bearing   Cognitive Status Examination   Orientation person   Level of Consciousness alert   Follows Commands and Answers Questions able to follow single-step instructions   Pain Assessment   Patient Currently in Pain Yes, see Vital Sign flowsheet  (abdominal pain)   Range of Motion (ROM)   ROM Comment B LEs WFL   Strength   Strength Comments B hip flex NT due to abdominal hematoma, B knee ext 4+/5, DF 4+/5   Bed Mobility   Bed  "Mobility Comments NT, pt declined getting OOB   Transfer Skills   Transfer Comments NT, pt declined getting OOB   Gait   Gait Comments NT, pt declined getting OOB   Balance   Balance Comments NT, pt declined getting OOB   Sensory Examination   Sensory Perception Comments Denies numbness or tingling   General Therapy Interventions   Planned Therapy Interventions bed mobility training;gait training;neuromuscular re-education;strengthening;transfer training   Clinical Impression   Criteria for Skilled Therapeutic Intervention yes, treatment indicated   PT Diagnosis Difficulty ambualting   Influenced by the following impairments Pain, dec balance, strength, activity tolerance   Functional limitations due to impairments Difficulty ambualting and transferring   Clinical Presentation Stable/Uncomplicated   Clinical Presentation Rationale medically stable   Clinical Decision Making (Complexity) Low complexity   Therapy Frequency` 5 times/week   Predicted Duration of Therapy Intervention (days/wks) 1 week   Anticipated Discharge Disposition Long Term Care Facility   Risk & Benefits of therapy have been explained Yes   Patient, Family & other staff in agreement with plan of care Yes   [facility] TM \"6 Clicks\"   2016, Trustees of [facility], under license to [employer].  All rights reserved.   6 Clicks Short Forms Basic Mobility Inpatient Short Form   [facility]  \"6 Clicks\" V.2 Basic Mobility Inpatient Short Form   1. Turning from your back to your side while in a flat bed without using bedrails? 3 - A Little   2. Moving from lying on your back to sitting on the side of a flat bed without using bedrails? 3 - A Little   3. Moving to and from a bed to a chair (including a wheelchair)? 2 - A Lot   4. Standing up from a chair using your arms (e.g., wheelchair, or bedside chair)? 2 - A Lot   5. To walk in hospital room? 2 - A Lot   6. Climbing 3-5 steps with a railing? 2 - A Lot   Basic Mobility " Raw Score (Score out of 24.Lower scores equate to lower levels of function) 14   Total Evaluation Time   Total Evaluation Time (Minutes) 20

## 2017-03-09 NOTE — PROGRESS NOTES
General Surgery Progress Note    A fib, on 2L 02, UO adequate, +BM, minimal pain.  Vitals: Blood pressure 107/76, pulse 122, temperature 98  F (36.7  C), temperature source Oral, resp. rate 18, weight 201 lb 4.5 oz (91.3 kg), SpO2 95 %.  Temperature Temp  Av.9  F (36.6  C)  Min: 97.8  F (36.6  C)  Max: 98  F (36.7  C)   I/O last 3 completed shifts:  In: 540 [P.O.:440; I.V.:100]  Out: 710 [Urine:710]    Abd: soft, nt, nd, R groin: ecchymosis, soft, nt.     Recent Labs   Lab Test  17   0745  17   1756  17   0600   17   0950   WBC  7.3   --   7.7   --   5.5   HGB  10.7*  11.0*  11.0*   < >  13.4   HCT  31.4*   --   31.7*   --   38.7*   PLT  209   --   176   --   146*    < > = values in this interval not displayed.      Recent Labs   Lab Test  17   0745  17   0950   NA  136  137   POTASSIUM  3.8  3.5   CHLORIDE  102  102   CO2  25  26   BUN  30  27   CR  1.06  1.20     89 year old male S/P Procedure(s):  Extraperitoneal hematoma secondary to hypercoagulable state  - Hgb stable, INR 1.17 yesterday  - OK to resume Coumadin from surgical standpoint, discussed with Dr. Doran  - Will sign off, please call if with questions    Keiko Johns PA-C  [facility]  [phone]

## 2017-03-09 NOTE — PLAN OF CARE
Problem: Individualization  Goal: Patient Preferences  Outcome: Improving  Heart rate between 100-120, pt endorse weakness and being tired. Crackles present, wheezing at times, cough present, getting relief with nebs. abdominal pain well managed. Hemoglobin stable.

## 2017-03-09 NOTE — PROGRESS NOTES
[facility]  Hospitalist Progress Note  Charlene Augustin MD  03/09/2017    Assessment & Plan   Mason Gonzalez is a 89 year old male with past medical history of atrial fibrillation on chronic anticoagulation, hypothyroidism and diet-controlled type 2 diabetes who presented from outside hospital for evaluation of an extraperitoneal hemorrhage.     Extraperitoneal hemorrhage, spontaneous bleed secondary to coagulopathy in the context of warfarin use.    The patient had a CT scan at outside facility which showed an 8.4 cm extraperitoneal hemorrhage in the right pelvis surrounding the bladder. Hemoglobin was stable at 13.6; however, INR was elevated at 5.8. S/p vitamin K at [facility].   Repeat CT abd/pelvis on admission showed increased size of extraperitoneal hematoma along the anterior aspect of the bladder which was measuring 11.6.   - received Kcentra, viamink and FFP upon admission  - serial hemoglobin down trended, but stable, 10.7 this am  - surgery following, appreciate help, no plan for sugical interavention at this time  - pain control: IV dilaudid prn, tramadol prn from PTA meds and helping  - holding warfarin at this time, this can be resumed when ok from surgical stand point    Acute blood loss anemia:  - secondary to above  - hgb 13 at outside hospital, down to 10.7 this am, stable last few check  - change hemoglobin checks to daily    Atrial fibrillation with RVR  This is likely secondary to missed metoprolol dose as well as worsened due to acute pain. Remains in RVR despite being placed back on his PTA meds  - INR has been reversed as above  - Increase Metoprolol 75mg BID  - Metoprolol IV prn dose increased for sustained HR >120  - holding warfarin due to above  ADDENDUM:  HR remains elevated 100s-120s most of the day. Will increase Metoprolol further to 100mg BID  Surgery note also reviewed and ok to resume warfarin. Warfarin ordered, and try to keep INR 2-2.5 if  possible      Hypothyroidism. Continue prior to admission Synthroid.     Diet controlled DM-II:  - will discontinue SSI since has not used any in last 24 hrs with resumption of oral intake    Congestive heart failure, unspecified. The patient has documentation [facility] CH[facility]. I have no echocardiograms available.   - off fluids sicne 3/8. CXR from 3/8 shows some interstitial changes  - Restart PTA Am Lasix and if BP allows, may add his pm Lasix later today or tomorrow  - monitor I & Os  ADDENDUM:  Continues to wheeze, concerned more about fluid overload in light of IVF resuscitation upon admission. Hold PO lasix, ordered IV Lasix 40mg IV BID x 2 doses to hold for SBP<100. HR control as above  Also ordered Echo to assess EF    Obstructive sleep apnea, does not use CPAP.     Influenza prophylaxis. The patient was initiated on Tamiflu prophylaxis at [facility] for 10 days total, started 3/6. We will continue.     Physical deconditioning:   - he is very weak at this time.   - will have PT/OT eval    Lines/tubes: discontinue wu cathteter    Communications: discussed with RN    Deep venous thrombosis prophylaxis: PCDs.       CODE STATUS: Full    Dispo: anticipate 2-3 days pending adequate HR control.      Interval History   Abdominal pain discomfort controlled with tramadol.  He states he feels very weak. Denies palpitation. Denies chest pain. Reports breathing is stable.   HR remains elevated.      -Data reviewed today: I reviewed all new labs and imaging over the last 24 hours. I personally reviewed no images or EKG's today.    Physical Exam   Heart Rate: 109, Blood pressure 107/76, pulse 122, temperature 98  F (36.7  C), temperature source Oral, resp. rate 18, weight 91.3 kg (201 lb 4.5 oz), SpO2 95 %.  Vitals:    03/07/17 1300 03/09/17 0530   Weight: 87.2 kg (192 lb 3.9 oz) 91.3 kg (201 lb 4.5 oz)     Vital Signs with Ranges  Temp:  [97.8  F (36.6  C)-98  F (36.7  C)] 98  F  (36.7  C)  Pulse:  [122-144] 122  Heart Rate:  [109-128] 109  Resp:  [18-20] 18  BP: (102-132)/(62-79) 107/76  SpO2:  [93 %-98 %] 95 %  I/O's Last 24 hours  I/O last 3 completed shifts:  In: 540 [P.O.:440; I.V.:100]  Out: 710 [Urine:710]    Constitutional: Alert, awake and no apparent distress   Respiratory: Bibasilar crackles, expiratory wheezing  Cardiovascular: irregular, tachycardic, trace LE edema bilaterally  Abdomen: soft, non-distended, +diffuse tenderness greater in the lower abdomen  Skin/Integumen: small ecchymosis noted right lower abdomen      Medications   All medications I am responsible for were reviewed.       metoprolol  25 mg Oral Once     metoprolol  75 mg Oral BID     furosemide (LASIX) tablet 40 mg  40 mg Oral Daily     oseltamivir (TAMIFLU) capsule 30 mg  30 mg Oral Daily     pneumococcal  0.5 mL Intramuscular Prior to discharge     sodium chloride (PF)  3 mL Intracatheter Q8H     insulin aspart  1-7 Units Subcutaneous TID AC     insulin aspart  1-5 Units Subcutaneous At Bedtime     levothyroxine (SYNTHROID/LEVOTHROID) tablet 200 mcg  200 mcg Oral Daily     sennosides  1 tablet Oral At Bedtime        Data     Recent Labs  Lab 03/09/17  0745 03/08/17  1756 03/08/17  0600  03/07/17  1820 03/07/17  1615  03/07/17  0950   WBC 7.3  --  7.7  --   --   --   --  5.5   HGB 10.7* 11.0* 11.0*  < >  --  12.5*  < > 13.4   *  --  99  --   --   --   --  100     --  176  --   --   --   --  146*   INR  --   --  1.17*  --  1.22* 1.26*  --  2.48*     --   --   --   --   --   --  137   POTASSIUM 3.8  --   --   --   --   --   --  3.5   CHLORIDE 102  --   --   --   --   --   --  102   CO2 25  --   --   --   --   --   --  26   BUN 30  --   --   --   --   --   --  27   CR 1.06  --   --   --   --   --   --  1.20   ANIONGAP 9  --   --   --   --   --   --  9   SARAH 8.2*  --   --   --   --   --   --  8.2*   GLC 87  --   --   --   --   --   --  99   < > = values in this interval not displayed.    Recent  Results (from the past 24 hour(s))   XR Chest Port 1 View    Narrative    XR CHEST PORT 1 VW  3/8/2017 4:28 PM     HISTORY:  shortness of breath    COMPARISON: None    FINDINGS:  Cardiac silhouette is normal in size. The aorta is heavily  calcified. Mild interstitial changes are seen in both lungs. Bilateral  shoulder arthroplasties.      Impression    IMPRESSION: Mild interstitial changes. No focal alveolar-type  infiltrates identified.

## 2017-03-09 NOTE — PLAN OF CARE
Problem: Goal Outcome Summary  Goal: Goal Outcome Summary  Outcome: No Change  VSS. 2L NC. A/O to self. [place]. Tramadol for pain. Abdomen small bruising area. C/o pain when coughing. [place] patent. Up x2 assist.  Mod cho diet. Tele afib RVR, prn lopressor given.

## 2017-03-09 NOTE — PHARMACY-ANTICOAGULATION SERVICE
Clinical Pharmacy - Warfarin Dosing Consult     Pharmacy has been consulted to manage this patient s warfarin therapy.  Indication: Atrial Fibrillation  Therapy Goal: INR 2-2.5  Warfarin Prior to Admission: Yes  Warfarin PTA Regimen:  6 mg Tuesday, Thursday. 5 mg the rest of the week.  Significant drug interactions: Tamiflu and Levothroxine    INR   Date Value Ref Range Status   03/08/2017 1.17 (H) 0.86 - 1.14 Final   03/07/2017 1.22 (H) 0.86 - 1.14 Final       Pharmacy will monitor Mason Gonzalez daily and order warfarin doses to achieve specified goal.      Please contact pharmacy as soon as possible if the warfarin needs to be held for a procedure or if the warfarin goals change.

## 2017-03-09 NOTE — PLAN OF CARE
Problem: Goal Outcome Summary  Goal: Goal Outcome Summary  PT: Orders received, eval completed, treatment initiated. Pt admitted with an extraperitoneal hematoma, high INR and afib with RVR. Prior to admit pt was living in the LTC at [facility], uses a FWW and ambulates up to 500 ft but also self propels in a wc, is independent with transfers and needs cues for ADLs. Currently pt declined getting OOB but participated in limited LE strengthening. Nursing stating he needs Ax2 for getting to the chair and pt states he feels weak. Pt demonstrates pain, dec strength, balance, activity tolerance and difficulty ambulating and transferriing and would benefit from skilled PT services in order to improve this. Recommend discharge to LTC with skilled PT services in order to return to his PLOF.

## 2017-03-09 NOTE — PROVIDER NOTIFICATION
Notified MD at 1915 PM regarding Afib rate >120.      Spoke with: Dr. Soria    Orders were obtained.    Comments: Rate uncontrolled despite 2.5mg prn IV metoprolol.

## 2017-03-09 NOTE — PROGRESS NOTES
Called regarding continued RVR with Afib.   Metoprolol recently increased.   If HR remains > 120, increased PRN metoprolol orders.

## 2017-03-09 NOTE — PLAN OF CARE
Problem: Goal Outcome Summary  Goal: Goal Outcome Summary  Outcome: No Change  VSS stable, heart rate 100-120s. One IV metoprolol given 5 mg, scheduled given as well. Patient complains of no pain. Cough syrup given for cough. Lung sounds coarse, productive cough. Brusing to RLQ noted.  no coverage needed. Hematuria improving, urine marily colored. Plan to continue to monitor heart rate and hemoglobin.

## 2017-03-09 NOTE — PLAN OF CARE
Problem: Individualization  Goal: Patient Preferences  Outcome: Improving  Hemoglobin stable at 11, complains RLQ abdominal pain, rate uncontrolled, new orders available for prn metoprolol. Hematuria improving, urine tan in color, marginal amount, fluids encouraged.

## 2017-03-09 NOTE — PLAN OF CARE
Problem: Goal Outcome Summary  Goal: Goal Outcome Summary  OT: Orders rec'd. Per chart review and discussion with PT, patient lives in a LTC and is typically needing cues to complete ADLs. PT is recommending return to LTC with PT. Deferring OT eval to LTC if patient not back to baseline with ADL.

## 2017-03-10 ENCOUNTER — APPOINTMENT (OUTPATIENT)
Dept: CARDIOLOGY | Facility: CLINIC | Age: 82
DRG: 813 | End: 2017-03-10
Attending: INTERNAL MEDICINE
Payer: COMMERCIAL

## 2017-03-10 LAB
ANION GAP SERPL CALCULATED.3IONS-SCNC: 10 MMOL/L (ref 3–14)
BUN SERPL-MCNC: 31 MG/DL (ref 7–30)
CALCIUM SERPL-MCNC: 8.2 MG/DL (ref 8.5–10.1)
CHLORIDE SERPL-SCNC: 100 MMOL/L (ref 94–109)
CO2 SERPL-SCNC: 26 MMOL/L (ref 20–32)
CREAT SERPL-MCNC: 1.08 MG/DL (ref 0.66–1.25)
ERYTHROCYTE [DISTWIDTH] IN BLOOD BY AUTOMATED COUNT: 14.5 % (ref 10–15)
GFR SERPL CREATININE-BSD FRML MDRD: 64 ML/MIN/1.7M2
GLUCOSE BLDC GLUCOMTR-MCNC: 80 MG/DL (ref 70–99)
GLUCOSE BLDC GLUCOMTR-MCNC: 85 MG/DL (ref 70–99)
GLUCOSE SERPL-MCNC: 84 MG/DL (ref 70–99)
HCT VFR BLD AUTO: 31.1 % (ref 40–53)
HGB BLD-MCNC: 10.7 G/DL (ref 13.3–17.7)
INR PPP: 1.31 (ref 0.86–1.14)
MCH RBC QN AUTO: 34.6 PG (ref 26.5–33)
MCHC RBC AUTO-ENTMCNC: 34.4 G/DL (ref 31.5–36.5)
MCV RBC AUTO: 101 FL (ref 78–100)
PLATELET # BLD AUTO: 232 10E9/L (ref 150–450)
POTASSIUM SERPL-SCNC: 3.5 MMOL/L (ref 3.4–5.3)
RBC # BLD AUTO: 3.09 10E12/L (ref 4.4–5.9)
SODIUM SERPL-SCNC: 136 MMOL/L (ref 133–144)
WBC # BLD AUTO: 7.6 10E9/L (ref 4–11)

## 2017-03-10 PROCEDURE — 25000128 H RX IP 250 OP 636

## 2017-03-10 PROCEDURE — 25000132 ZZH RX MED GY IP 250 OP 250 PS 637

## 2017-03-10 PROCEDURE — 85610 PROTHROMBIN TIME: CPT | Performed by: INTERNAL MEDICINE

## 2017-03-10 PROCEDURE — 12000007 ZZH R&B INTERMEDIATE

## 2017-03-10 PROCEDURE — 80048 BASIC METABOLIC PNL TOTAL CA: CPT | Performed by: INTERNAL MEDICINE

## 2017-03-10 PROCEDURE — 25000128 H RX IP 250 OP 636: Performed by: INTERNAL MEDICINE

## 2017-03-10 PROCEDURE — 25000132 ZZH RX MED GY IP 250 OP 250 PS 637: Performed by: PHYSICIAN ASSISTANT

## 2017-03-10 PROCEDURE — 40000264 ECHO COMPLETE WITH LUMASON

## 2017-03-10 PROCEDURE — 25000307 HC RX OP HPI UCR WEL IP 250: Performed by: INTERNAL MEDICINE

## 2017-03-10 PROCEDURE — 93306 TTE W/DOPPLER COMPLETE: CPT | Mod: 26 | Performed by: INTERNAL MEDICINE

## 2017-03-10 PROCEDURE — 99233 SBSQ HOSP IP/OBS HIGH 50: CPT | Performed by: HOSPITALIST

## 2017-03-10 PROCEDURE — 25000128 H RX IP 250 OP 636: Performed by: HOSPITALIST

## 2017-03-10 PROCEDURE — 00000146 ZZHCL STATISTIC GLUCOSE BY METER IP

## 2017-03-10 PROCEDURE — 85027 COMPLETE CBC AUTOMATED: CPT | Performed by: INTERNAL MEDICINE

## 2017-03-10 PROCEDURE — 25000132 ZZH RX MED GY IP 250 OP 250 PS 637: Performed by: INTERNAL MEDICINE

## 2017-03-10 PROCEDURE — 25500064 ZZH RX 255 OP 636: Performed by: HOSPITALIST

## 2017-03-10 PROCEDURE — 36415 COLL VENOUS BLD VENIPUNCTURE: CPT | Performed by: INTERNAL MEDICINE

## 2017-03-10 PROCEDURE — 25000125 ZZHC RX 250: Performed by: HOSPITALIST

## 2017-03-10 PROCEDURE — 99207 ZZC CDG-MDM COMPONENT: MEETS MODERATE - UP CODED: CPT | Performed by: HOSPITALIST

## 2017-03-10 RX ORDER — IPRATROPIUM BROMIDE AND ALBUTEROL SULFATE 2.5; .5 MG/3ML; MG/3ML
3 SOLUTION RESPIRATORY (INHALATION)
Status: DISCONTINUED | OUTPATIENT
Start: 2017-03-10 | End: 2017-03-19 | Stop reason: HOSPADM

## 2017-03-10 RX ORDER — FUROSEMIDE 10 MG/ML
20 INJECTION INTRAMUSCULAR; INTRAVENOUS ONCE
Status: COMPLETED | OUTPATIENT
Start: 2017-03-10 | End: 2017-03-10

## 2017-03-10 RX ORDER — WARFARIN SODIUM 2 MG/1
4 TABLET ORAL
Status: COMPLETED | OUTPATIENT
Start: 2017-03-10 | End: 2017-03-10

## 2017-03-10 RX ORDER — ALBUTEROL SULFATE 0.83 MG/ML
2.5 SOLUTION RESPIRATORY (INHALATION)
Status: DISCONTINUED | OUTPATIENT
Start: 2017-03-10 | End: 2017-03-19 | Stop reason: HOSPADM

## 2017-03-10 RX ORDER — FUROSEMIDE 10 MG/ML
INJECTION INTRAMUSCULAR; INTRAVENOUS
Status: COMPLETED
Start: 2017-03-10 | End: 2017-03-10

## 2017-03-10 RX ADMIN — ACETAMINOPHEN 650 MG: 325 TABLET, FILM COATED ORAL at 09:32

## 2017-03-10 RX ADMIN — LEVALBUTEROL HYDROCHLORIDE 1.25 MG: 1.25 SOLUTION, CONCENTRATE RESPIRATORY (INHALATION) at 12:21

## 2017-03-10 RX ADMIN — LEVOTHYROXINE SODIUM 200 MCG: 100 TABLET ORAL at 09:32

## 2017-03-10 RX ADMIN — FUROSEMIDE 40 MG: 10 INJECTION, SOLUTION INTRAVENOUS at 09:45

## 2017-03-10 RX ADMIN — SENNOSIDES 1 TABLET: 8.6 TABLET, FILM COATED ORAL at 20:30

## 2017-03-10 RX ADMIN — SULFUR HEXAFLUORIDE 5 ML: KIT at 11:33

## 2017-03-10 RX ADMIN — IPRATROPIUM BROMIDE AND ALBUTEROL SULFATE 3 ML: .5; 3 SOLUTION RESPIRATORY (INHALATION) at 19:29

## 2017-03-10 RX ADMIN — FUROSEMIDE 20 MG: 10 INJECTION, SOLUTION INTRAVENOUS at 13:31

## 2017-03-10 RX ADMIN — METOPROLOL TARTRATE 100 MG: 100 TABLET, FILM COATED ORAL at 20:30

## 2017-03-10 RX ADMIN — IPRATROPIUM BROMIDE AND ALBUTEROL SULFATE 3 ML: .5; 3 SOLUTION RESPIRATORY (INHALATION) at 15:23

## 2017-03-10 RX ADMIN — WARFARIN SODIUM 4 MG: 2 TABLET ORAL at 18:27

## 2017-03-10 RX ADMIN — METOPROLOL TARTRATE 100 MG: 100 TABLET, FILM COATED ORAL at 09:31

## 2017-03-10 RX ADMIN — OSELTAMIVIR PHOSPHATE 30 MG: 30 CAPSULE ORAL at 09:32

## 2017-03-10 NOTE — PLAN OF CARE
Problem: Goal Outcome Summary  Goal: Goal Outcome Summary  Outcome: Improving  Tele Afib with RVR, converted to CVR after scheduled metoprolol. .A/Ox4, forgetful at times. A-1 with walker, needs encouragement. Poor appetite, refused dinner, drinking adequate fluids. . No c/o N/V. BS active, passing flatus. bruising across lower right abdomen and hip, will continue to monitor. Ambulated halls X2 this evening. harsh productive cough with thick sputum. Crackles in bilateral lower lungs. Pain controlled with tylenol and tramadol.

## 2017-03-10 NOTE — PLAN OF CARE
Problem: Goal Outcome Summary  Goal: Goal Outcome Summary  Outcome: No Change  A&Ox4 but with some confusion/impulsiveness. [place]. Bruising noted to R flank. LS diminished with crackles in bases. Dry non-productive hacking cough. Wound to coccyx covered with Mepilex. Tele: Afib RVR with PVCs. Up with A1 - using urinal, small frequent amounts with incontinence. Diabetic/2gm sodium diet.

## 2017-03-10 NOTE — PROGRESS NOTES
[facility]  Hospitalist Progress Note  Candi Kapoor MD  03/10/2017    Assessment & Plan   Mason Gonzalez is a 89 year old male with past medical history of atrial fibrillation on chronic anticoagulation, hypothyroidism and diet-controlled type 2 diabetes who presented from outside hospital for evaluation of an extraperitoneal hemorrhage.     Extraperitoneal hemorrhage, spontaneous bleed secondary to coagulopathy in the context of warfarin use.    The patient had a CT scan at outside facility which showed an 8.4 cm extraperitoneal hemorrhage in the right pelvis surrounding the bladder. Hemoglobin was stable at 13.6; however, INR was elevated at 5.8. S/p vitamin K at [facility].   Repeat CT abd/pelvis on admission showed increased size of extraperitoneal hematoma along the anterior aspect of the bladder which was measuring 11.6.   - received Kcentra, vitamin K and FFP upon admission  - serial hemoglobin down trended initially but stable now, 10.7 this am  - Appreciate help, no plan for sugical interavention at this time, and okayed to resume warfarin 3/9.  - pain control: IV dilaudid prn, tramadol prn from PTA med     Acute blood loss anemia:  - secondary to above  - hgb 13 at outside hospital, down to 10.7 this am, stable last few check  - change hemoglobin checks to daily    Atrial fibrillation with RVR  This is likely secondary to missed metoprolol dose as well as worsened due to acute pain. Remains in RVR despite being placed back on his PTA meds  - INR was reversed as above, now back on warfarin.  - Metoprolol increased to 100 mg BID, now rate controlled.   - Metoprolol IV prn dose increased for sustained HR >120  - If goes into rapid a fib, likely will use digoxin instead of increasing BB given marked wheezing.    Hypothyroidism. Continue prior to admission Synthroid.     Diet controlled DM-II:  - was not using ISS so discontinued., BS controlled.    Congestive heart failure,  unspecified. The patient has [facility]. No prior echocardiograms available.   - off fluids sicne 3/8. CXR from 3/8 shows some interstitial changes, and weight also noted up to 91 kg (87 on admission)- - received 40 mg lasix twice 3/9, I&O even, weight trended down, denies dyspnea today but persistent wheezing and lungs very coarse.  - add scheduled duoneb (change to xopenex if goes in to RVR)  - one more dose of lasix 20 mg IV  - follow I&O and daily weight.  - resume PTA lasix from am  - ECHO done this admission, normal LVEF.    Obstructive sleep apnea, does not use CPAP.     Influenza prophylaxis. The patient was initiated on Tamiflu prophylaxis at long-term [facility] facility for 10 days total, started 3/6. Continue.     Physical deconditioning:   - he is very weak at this time.   - PT eval noted.    Deep venous thrombosis prophylaxis: PCDs/warfarin resumed      CODE STATUS: Full    Dispo: anticipate 2-3 days when respiratory status improves, INR therapeutic, Hb remains stable.  Discussed with patient and RN.    Interval History    Patient was seen and examined, denies abdominal pain or dyspnea. Up in bed, eating lunch, he says his room is cold. No fever.  - HR is now better controlled, in 90s in tele. Remains in a fib.    -Data reviewed today: I reviewed all new labs and imaging over the last 24 hours. I personally reviewed no images or EKG's today. Tele shows A fib with controlled ventricular rate.    Physical Exam   Heart Rate: 74, Blood pressure 102/65, pulse 105, temperature 97.4  F (36.3  C), temperature source Axillary, resp. rate 18, height 1.829 m (6'), weight 89.6 kg (197 lb 8.5 oz), SpO2 94 %.  Vitals:    03/07/17 1300 03/09/17 0530 03/10/17 0600   Weight: 87.2 kg (192 lb 3.9 oz) 91.3 kg (201 lb 4.5 oz) 89.6 kg (197 lb 8.5 oz)     Vital Signs with Ranges  Temp:  [97.3  F (36.3  C)-98  F (36.7  C)] 97.4  F (36.3  C)  Pulse:  [105-120] 105  Heart Rate:  []  74  Resp:  [16-18] 18  BP: ()/(55-80) 102/65  SpO2:  [94 %-97 %] 94 %  I/O's Last 24 hours  I/O last 3 completed shifts:  In: 1100 [P.O.:1100]  Out: 1300 [Urine:1300]    Constitutional: Alert, awake, up in bed, and no apparent distress   Respiratory: Crackles bilaterally with scattered expiratory wheezing, not in distress. On 1.5 L supplemental O2.  Cardiovascular: irregular, no tachycardia, trace LE edema bilaterally  Abdomen: soft, non-distended, + mild diffuse tenderness greater in the lower abdomen  Skin/Integumen: small ecchymosis noted right lower abdomen      Medications   All medications I am responsible for were reviewed.    Warfarin Therapy Reminder         warfarin  4 mg Oral ONCE at 18:00     ipratropium - albuterol 0.5 mg/2.5 mg/3 mL  3 mL Nebulization 4x daily     furosemide  20 mg Intravenous Once     metoprolol  100 mg Oral BID     oseltamivir (TAMIFLU) capsule 30 mg  30 mg Oral Daily     pneumococcal  0.5 mL Intramuscular Prior to discharge     sodium chloride (PF)  3 mL Intracatheter Q8H     levothyroxine (SYNTHROID/LEVOTHROID) tablet 200 mcg  200 mcg Oral Daily     sennosides  1 tablet Oral At Bedtime        Data     Recent Labs  Lab 03/10/17  0730 03/09/17  1745 03/09/17  0745 03/08/17  1756 03/08/17  0600  03/07/17  0950   WBC 7.6  --  7.3  --  7.7  --  5.5   HGB 10.7*  --  10.7* 11.0* 11.0*  < > 13.4   *  --  101*  --  99  --  100     --  209  --  176  --  146*   INR 1.31* 1.23*  --   --  1.17*  < > 2.48*     --  136  --   --   --  137   POTASSIUM 3.5  --  3.8  --   --   --  3.5   CHLORIDE 100  --  102  --   --   --  102   CO2 26  --  25  --   --   --  26   BUN 31*  --  30  --   --   --  27   CR 1.08  --  1.06  --   --   --  1.20   ANIONGAP 10  --  9  --   --   --  9   SARAH 8.2*  --  8.2*  --   --   --  8.2*   GLC 84  --  87  --   --   --  99   < > = values in this interval not displayed.    No results found for this or any previous visit (from the past 24 hour(s)).

## 2017-03-11 ENCOUNTER — APPOINTMENT (OUTPATIENT)
Dept: GENERAL RADIOLOGY | Facility: CLINIC | Age: 82
DRG: 813 | End: 2017-03-11
Attending: HOSPITALIST
Payer: COMMERCIAL

## 2017-03-11 LAB
ANION GAP SERPL CALCULATED.3IONS-SCNC: 10 MMOL/L (ref 3–14)
BUN SERPL-MCNC: 25 MG/DL (ref 7–30)
CALCIUM SERPL-MCNC: 8.1 MG/DL (ref 8.5–10.1)
CHLORIDE SERPL-SCNC: 99 MMOL/L (ref 94–109)
CO2 SERPL-SCNC: 27 MMOL/L (ref 20–32)
CREAT SERPL-MCNC: 1.01 MG/DL (ref 0.66–1.25)
ERYTHROCYTE [DISTWIDTH] IN BLOOD BY AUTOMATED COUNT: 14.3 % (ref 10–15)
GFR SERPL CREATININE-BSD FRML MDRD: 69 ML/MIN/1.7M2
GLUCOSE BLDC GLUCOMTR-MCNC: 104 MG/DL (ref 70–99)
GLUCOSE BLDC GLUCOMTR-MCNC: 166 MG/DL (ref 70–99)
GLUCOSE BLDC GLUCOMTR-MCNC: 74 MG/DL (ref 70–99)
GLUCOSE BLDC GLUCOMTR-MCNC: 78 MG/DL (ref 70–99)
GLUCOSE SERPL-MCNC: 74 MG/DL (ref 70–99)
HCT VFR BLD AUTO: 30.6 % (ref 40–53)
HCT VFR BLD AUTO: 31.8 % (ref 40–53)
HGB BLD-MCNC: 10.5 G/DL (ref 13.3–17.7)
HGB BLD-MCNC: 11 G/DL (ref 13.3–17.7)
INR PPP: 1.5 (ref 0.86–1.14)
LACTATE BLD-SCNC: 2.1 MMOL/L (ref 0.7–2.1)
MAGNESIUM SERPL-MCNC: 1.8 MG/DL (ref 1.6–2.3)
MCH RBC QN AUTO: 34.6 PG (ref 26.5–33)
MCHC RBC AUTO-ENTMCNC: 34.6 G/DL (ref 31.5–36.5)
MCV RBC AUTO: 100 FL (ref 78–100)
PLATELET # BLD AUTO: 310 10E9/L (ref 150–450)
POTASSIUM SERPL-SCNC: 2.8 MMOL/L (ref 3.4–5.3)
POTASSIUM SERPL-SCNC: 3.8 MMOL/L (ref 3.4–5.3)
PROCALCITONIN SERPL-MCNC: 0.12 NG/ML
RBC # BLD AUTO: 3.18 10E12/L (ref 4.4–5.9)
SODIUM SERPL-SCNC: 136 MMOL/L (ref 133–144)
WBC # BLD AUTO: 8 10E9/L (ref 4–11)

## 2017-03-11 PROCEDURE — 25000307 HC RX OP HPI UCR WEL IP 250: Performed by: INTERNAL MEDICINE

## 2017-03-11 PROCEDURE — 25000128 H RX IP 250 OP 636: Performed by: HOSPITALIST

## 2017-03-11 PROCEDURE — 99233 SBSQ HOSP IP/OBS HIGH 50: CPT | Performed by: HOSPITALIST

## 2017-03-11 PROCEDURE — 36415 COLL VENOUS BLD VENIPUNCTURE: CPT | Performed by: HOSPITALIST

## 2017-03-11 PROCEDURE — 25000132 ZZH RX MED GY IP 250 OP 250 PS 637: Performed by: HOSPITALIST

## 2017-03-11 PROCEDURE — 83605 ASSAY OF LACTIC ACID: CPT | Performed by: HOSPITALIST

## 2017-03-11 PROCEDURE — 25000128 H RX IP 250 OP 636: Performed by: INTERNAL MEDICINE

## 2017-03-11 PROCEDURE — 85610 PROTHROMBIN TIME: CPT | Performed by: INTERNAL MEDICINE

## 2017-03-11 PROCEDURE — 85018 HEMOGLOBIN: CPT | Performed by: HOSPITALIST

## 2017-03-11 PROCEDURE — 25000128 H RX IP 250 OP 636: Performed by: PHYSICIAN ASSISTANT

## 2017-03-11 PROCEDURE — 94640 AIRWAY INHALATION TREATMENT: CPT | Mod: 76

## 2017-03-11 PROCEDURE — 99207 ZZC APP CREDIT; MD BILLING SHARED VISIT: CPT | Performed by: INTERNAL MEDICINE

## 2017-03-11 PROCEDURE — 87040 BLOOD CULTURE FOR BACTERIA: CPT | Performed by: HOSPITALIST

## 2017-03-11 PROCEDURE — 25000125 ZZHC RX 250: Performed by: HOSPITALIST

## 2017-03-11 PROCEDURE — 25000132 ZZH RX MED GY IP 250 OP 250 PS 637

## 2017-03-11 PROCEDURE — 90670 PCV13 VACCINE IM: CPT | Performed by: PHYSICIAN ASSISTANT

## 2017-03-11 PROCEDURE — 12000007 ZZH R&B INTERMEDIATE

## 2017-03-11 PROCEDURE — 25000132 ZZH RX MED GY IP 250 OP 250 PS 637: Performed by: INTERNAL MEDICINE

## 2017-03-11 PROCEDURE — 71020 XR CHEST 2 VW: CPT

## 2017-03-11 PROCEDURE — 85014 HEMATOCRIT: CPT | Performed by: HOSPITALIST

## 2017-03-11 PROCEDURE — 00000146 ZZHCL STATISTIC GLUCOSE BY METER IP

## 2017-03-11 PROCEDURE — 84132 ASSAY OF SERUM POTASSIUM: CPT | Performed by: HOSPITALIST

## 2017-03-11 PROCEDURE — 94640 AIRWAY INHALATION TREATMENT: CPT

## 2017-03-11 PROCEDURE — 80048 BASIC METABOLIC PNL TOTAL CA: CPT | Performed by: HOSPITALIST

## 2017-03-11 PROCEDURE — 85027 COMPLETE CBC AUTOMATED: CPT | Performed by: HOSPITALIST

## 2017-03-11 PROCEDURE — 25000125 ZZHC RX 250: Performed by: INTERNAL MEDICINE

## 2017-03-11 PROCEDURE — 25000132 ZZH RX MED GY IP 250 OP 250 PS 637: Performed by: PHYSICIAN ASSISTANT

## 2017-03-11 PROCEDURE — 83735 ASSAY OF MAGNESIUM: CPT | Performed by: HOSPITALIST

## 2017-03-11 PROCEDURE — 40000275 ZZH STATISTIC RCP TIME EA 10 MIN

## 2017-03-11 PROCEDURE — 84145 PROCALCITONIN (PCT): CPT | Performed by: HOSPITALIST

## 2017-03-11 RX ORDER — POTASSIUM CHLORIDE 29.8 MG/ML
20 INJECTION INTRAVENOUS
Status: DISCONTINUED | OUTPATIENT
Start: 2017-03-11 | End: 2017-03-19 | Stop reason: HOSPADM

## 2017-03-11 RX ORDER — FUROSEMIDE 10 MG/ML
40 INJECTION INTRAMUSCULAR; INTRAVENOUS DAILY
Status: DISCONTINUED | OUTPATIENT
Start: 2017-03-11 | End: 2017-03-12

## 2017-03-11 RX ORDER — BENZONATATE 100 MG/1
100 CAPSULE ORAL 3 TIMES DAILY PRN
Status: DISCONTINUED | OUTPATIENT
Start: 2017-03-11 | End: 2017-03-16

## 2017-03-11 RX ORDER — FUROSEMIDE 10 MG/ML
40 INJECTION INTRAMUSCULAR; INTRAVENOUS ONCE
Status: COMPLETED | OUTPATIENT
Start: 2017-03-11 | End: 2017-03-11

## 2017-03-11 RX ORDER — POTASSIUM CHLORIDE 7.45 MG/ML
10 INJECTION INTRAVENOUS
Status: DISCONTINUED | OUTPATIENT
Start: 2017-03-11 | End: 2017-03-19 | Stop reason: HOSPADM

## 2017-03-11 RX ORDER — PREDNISONE 20 MG/1
40 TABLET ORAL DAILY
Status: DISCONTINUED | OUTPATIENT
Start: 2017-03-12 | End: 2017-03-12

## 2017-03-11 RX ORDER — FUROSEMIDE 10 MG/ML
20 INJECTION INTRAMUSCULAR; INTRAVENOUS DAILY
Status: DISCONTINUED | OUTPATIENT
Start: 2017-03-11 | End: 2017-03-12

## 2017-03-11 RX ORDER — POTASSIUM CHLORIDE 750 MG/1
10 TABLET, EXTENDED RELEASE ORAL 2 TIMES DAILY
Status: DISCONTINUED | OUTPATIENT
Start: 2017-03-11 | End: 2017-03-12

## 2017-03-11 RX ORDER — POTASSIUM CHLORIDE 1.5 G/1.58G
20-40 POWDER, FOR SOLUTION ORAL
Status: DISCONTINUED | OUTPATIENT
Start: 2017-03-11 | End: 2017-03-19 | Stop reason: HOSPADM

## 2017-03-11 RX ORDER — WARFARIN SODIUM 2 MG/1
4 TABLET ORAL
Status: COMPLETED | OUTPATIENT
Start: 2017-03-11 | End: 2017-03-11

## 2017-03-11 RX ORDER — POTASSIUM CHLORIDE 1500 MG/1
20-40 TABLET, EXTENDED RELEASE ORAL
Status: DISCONTINUED | OUTPATIENT
Start: 2017-03-11 | End: 2017-03-19 | Stop reason: HOSPADM

## 2017-03-11 RX ORDER — MAGNESIUM SULFATE HEPTAHYDRATE 40 MG/ML
4 INJECTION, SOLUTION INTRAVENOUS EVERY 4 HOURS PRN
Status: DISCONTINUED | OUTPATIENT
Start: 2017-03-11 | End: 2017-03-19 | Stop reason: HOSPADM

## 2017-03-11 RX ORDER — METHYLPREDNISOLONE SODIUM SUCCINATE 125 MG/2ML
60 INJECTION, POWDER, LYOPHILIZED, FOR SOLUTION INTRAMUSCULAR; INTRAVENOUS ONCE
Status: COMPLETED | OUTPATIENT
Start: 2017-03-11 | End: 2017-03-11

## 2017-03-11 RX ORDER — PIPERACILLIN SODIUM, TAZOBACTAM SODIUM 3; .375 G/15ML; G/15ML
3.38 INJECTION, POWDER, LYOPHILIZED, FOR SOLUTION INTRAVENOUS EVERY 6 HOURS
Status: DISCONTINUED | OUTPATIENT
Start: 2017-03-11 | End: 2017-03-16

## 2017-03-11 RX ADMIN — IPRATROPIUM BROMIDE AND ALBUTEROL SULFATE 3 ML: .5; 3 SOLUTION RESPIRATORY (INHALATION) at 20:24

## 2017-03-11 RX ADMIN — OSELTAMIVIR PHOSPHATE 30 MG: 30 CAPSULE ORAL at 10:18

## 2017-03-11 RX ADMIN — AZITHROMYCIN MONOHYDRATE 500 MG: 500 INJECTION, POWDER, LYOPHILIZED, FOR SOLUTION INTRAVENOUS at 20:00

## 2017-03-11 RX ADMIN — PNEUMOCOCCAL 13-VALENT CONJUGATE VACCINE 0.5 ML: 2.2; 2.2; 2.2; 2.2; 2.2; 4.4; 2.2; 2.2; 2.2; 2.2; 2.2; 2.2; 2.2 INJECTION, SUSPENSION INTRAMUSCULAR at 12:53

## 2017-03-11 RX ADMIN — WARFARIN SODIUM 4 MG: 2 TABLET ORAL at 17:00

## 2017-03-11 RX ADMIN — POTASSIUM CHLORIDE 10 MEQ: 1500 TABLET, EXTENDED RELEASE ORAL at 14:22

## 2017-03-11 RX ADMIN — POTASSIUM CHLORIDE 10 MEQ: 1500 TABLET, EXTENDED RELEASE ORAL at 13:42

## 2017-03-11 RX ADMIN — POTASSIUM CHLORIDE 40 MEQ: 1500 TABLET, EXTENDED RELEASE ORAL at 10:19

## 2017-03-11 RX ADMIN — METOPROLOL TARTRATE 5 MG: 5 INJECTION INTRAVENOUS at 17:00

## 2017-03-11 RX ADMIN — METOPROLOL TARTRATE 100 MG: 100 TABLET, FILM COATED ORAL at 22:00

## 2017-03-11 RX ADMIN — IPRATROPIUM BROMIDE AND ALBUTEROL SULFATE 3 ML: .5; 3 SOLUTION RESPIRATORY (INHALATION) at 11:50

## 2017-03-11 RX ADMIN — FUROSEMIDE 40 MG: 10 INJECTION, SOLUTION INTRAVENOUS at 02:13

## 2017-03-11 RX ADMIN — LEVALBUTEROL HYDROCHLORIDE 1.25 MG: 1.25 SOLUTION, CONCENTRATE RESPIRATORY (INHALATION) at 00:15

## 2017-03-11 RX ADMIN — FUROSEMIDE 20 MG: 10 INJECTION, SOLUTION INTRAVENOUS at 17:00

## 2017-03-11 RX ADMIN — POTASSIUM CHLORIDE 10 MEQ: 14.9 INJECTION, SOLUTION, CONCENTRATE PARENTERAL at 17:50

## 2017-03-11 RX ADMIN — FUROSEMIDE 40 MG: 10 INJECTION, SOLUTION INTRAVENOUS at 10:53

## 2017-03-11 RX ADMIN — PIPERACILLIN SODIUM,TAZOBACTAM SODIUM 3.38 G: 3; .375 INJECTION, POWDER, FOR SOLUTION INTRAVENOUS at 18:34

## 2017-03-11 RX ADMIN — ACETAMINOPHEN 650 MG: 325 TABLET, FILM COATED ORAL at 17:00

## 2017-03-11 RX ADMIN — GUAIFENESIN 10 ML: 100 SOLUTION ORAL at 17:00

## 2017-03-11 RX ADMIN — METOPROLOL TARTRATE 100 MG: 100 TABLET, FILM COATED ORAL at 10:19

## 2017-03-11 RX ADMIN — Medication 25 MG: at 10:39

## 2017-03-11 RX ADMIN — LEVOTHYROXINE SODIUM 200 MCG: 100 TABLET ORAL at 07:35

## 2017-03-11 RX ADMIN — METHYLPREDNISOLONE SODIUM SUCCINATE 62.5 MG: 125 INJECTION, POWDER, FOR SOLUTION INTRAMUSCULAR; INTRAVENOUS at 18:34

## 2017-03-11 RX ADMIN — POTASSIUM CHLORIDE 10 MEQ: 14.9 INJECTION, SOLUTION, CONCENTRATE PARENTERAL at 16:51

## 2017-03-11 RX ADMIN — IPRATROPIUM BROMIDE AND ALBUTEROL SULFATE 3 ML: .5; 3 SOLUTION RESPIRATORY (INHALATION) at 08:03

## 2017-03-11 RX ADMIN — Medication 25 MG: at 17:00

## 2017-03-11 RX ADMIN — Medication 25 MG: at 01:29

## 2017-03-11 RX ADMIN — METOPROLOL TARTRATE 5 MG: 5 INJECTION INTRAVENOUS at 17:22

## 2017-03-11 RX ADMIN — ONDANSETRON 4 MG: 2 SOLUTION INTRAMUSCULAR; INTRAVENOUS at 21:32

## 2017-03-11 RX ADMIN — IPRATROPIUM BROMIDE AND ALBUTEROL SULFATE 3 ML: .5; 3 SOLUTION RESPIRATORY (INHALATION) at 14:23

## 2017-03-11 RX ADMIN — ACETAMINOPHEN 650 MG: 325 TABLET, FILM COATED ORAL at 22:34

## 2017-03-11 NOTE — PLAN OF CARE
Problem: Goal Outcome Summary  Goal: Goal Outcome Summary  Outcome: No Change  Pt requiring lasix for course crackles, some wheezes, MD aware, called respiratory for prn neb, pt needs 2 liters oxygen at all times and desats to the 70's and 80's quickly, voiding, no bm, potassium replacement in process with goal for po route to avoid more fluid intake, pt needs a break between taking even 1/2 pill and did not tolerate powder with liquid, coughing frequently, DIAMOND, vss, tramadol for pain, spoke to daughter earlier.

## 2017-03-11 NOTE — PROGRESS NOTES
HOSPITALIST CROSS COVER NOTE    Called by RN due to increased work of breathing.    O2 requirements stable at 2L but RN feels patient is 'starting to get congested.'    Has history of afib, tricuspid regurg---Echo shows preserved EF.    Will give 40 mg IV lasix x 1. Monitor response.    VANE PAULINO MD

## 2017-03-11 NOTE — PLAN OF CARE
Problem: Goal Outcome Summary  Goal: Goal Outcome Summary  Outcome: No Change  Restless at times. Rather forgetful yet pleasant. Afebrile. Pressures stable. Remains in a fib, rates in lo (100s-110s) over night. Maintained on 2L 02. DIAMOND c slight wheezing & crackles. Fair/dry cough. Good uop post one time lasix, initially voids c some frequency(negative for residual). Bruising to R flank unchanged. T/R, up c 1-2. bs monitored. dtr updated last galo.

## 2017-03-11 NOTE — PROGRESS NOTES
Completed and placed in TC/MA file.  Electronically signed by Yumiko Ralph M.D.     [facility]  Hospitalist Progress Note      Candi Kapoor MD  03/11/2017    Assessment & Plan      Mason Gonzalez is a 89 year old male with past medical history of atrial fibrillation on chronic anticoagulation, hypothyroidism and diet-controlled type 2 diabetes who presented from outside hospital for evaluation of an extraperitoneal hemorrhage.     Extraperitoneal hemorrhage, spontaneous bleed secondary to coagulopathy in the context of warfarin use.    The patient had a CT scan at outside facility which showed an 8.4 cm extraperitoneal hemorrhage in the right pelvis surrounding the bladder. Hemoglobin was stable at 13.6; however, INR was elevated at 5.8. S/p vitamin K at [facility].   Repeat CT abd/pelvis on admission showed increased size of extraperitoneal hematoma along the anterior aspect of the bladder which was measuring 11.6.   - received Kcentra, vitamin K and FFP upon admission  - serial hemoglobin down trended initially but stable now, 1057 this am  - Appreciate help, no plan for sugical interavention at this time, and okayed to resume warfarin 3/9.  - pain control: IV dilaudid prn, tramadol prn from PTA med     Acute blood loss anemia:  - secondary to above  - hgb 13 at outside hospital, down to 10.5 this am, stable last few check  - change hemoglobin checks to daily    Atrial fibrillation with RVR  This is likely secondary to missed metoprolol dose as well as worsened due to acute pain. Remains in RVR despite being placed back on his PTA meds  - INR was reversed as above, now back on warfarin.  - Metoprolol increased to 100 mg BID, now rate controlled.   - Metoprolol IV prn dose increased for sustained HR >120  - If goes into rapid a fib, likely will use digoxin instead of increasing BB given marked wheezing (as long as potassium level is better)    Hypothyroidism. Continue prior to admission Synthroid.     Diet controlled DM-II:  - was not using ISS so discontinued., BS  controlled.    Congestive heart failure, unspecified. The patient has documentation of CHF in [facility]. No prior echocardiograms available.   - off fluids sicne 3/8. CXR from 3/8 shows some interstitial changes, and weight also noted up to 91 kg (87 on admission), receiving intermittent lasix, has persistent wheezing and lungs very coarse.  - add scheduled duoneb (change to xopenex if goes in to RVR)  - started on lasix 40 mg in am and 20 mg in pm  - follow I&O and daily weight.  - ECHO done this admission, normal LVEF.    Hypokalemia: On replacement protocol.  - check magnesium as well and replace as needed  - scheduled potassium as patient on lasix.      Suspected HCAP/atypical PNA: Persistent wheezing and dyspnea, has dry cough.  - Repeat CXR shows coarse Lt lung infiltrates worse than prior.  - blood cultures x2  - Zosyn and azithromycin.  - procalcitonin  - short course of steroid as well.    Obstructive sleep apnea, does not use CPAP.     Influenza prophylaxis. The patient was initiated on Tamiflu prophylaxis at long-term [facility] for 10 days total, started 3/6. Continue.     Physical deconditioning:   - he is very weak at this time.   - PT eval noted.    Deep venous thrombosis prophylaxis: - PCDs/warfarin resumed      CODE STATUS: Full    Dispo: anticipate 2-3 days when respiratory status improves, INR therapeutic, Hb remains stable.  Discussed with patient and RN.    Interval History    Patient was seen and examined, cough (+), no expectoration.  - dyspnea present.  - wheezing unchanged.  - feels tired today.  - Afebrile.    -Data reviewed today: I reviewed all new labs and imaging over the last 24 hours. I personally reviewed no images or EKG's today. Tele shows A fib with HR in low 100s    Physical Exam   Heart Rate: 107, Blood pressure 100/67, pulse 106, temperature 97.8  F (36.6  C), temperature source Oral, resp. rate 16, height 1.829 m (6'), weight 90.1 kg (198 lb 10.2 oz),  SpO2 95 %.  Vitals:    03/09/17 0530 03/10/17 0600 03/11/17 0630   Weight: 91.3 kg (201 lb 4.5 oz) 89.6 kg (197 lb 8.5 oz) 90.1 kg (198 lb 10.2 oz)     Vital Signs with Ranges  Temp:  [97.5  F (36.4  C)-98.1  F (36.7  C)] 97.8  F (36.6  C)  Pulse:  [102-107] 106  Heart Rate:  [] 107  Resp:  [16-20] 16  BP: (100-121)/(53-83) 100/67  SpO2:  [74 %-100 %] 95 %  I/O's Last 24 hours  I/O last 3 completed shifts:  In: 510 [P.O.:510]  Out: 2955 [Urine:2955]    Constitutional: Alert, awake, up in bed, and no apparent distress   Respiratory: Crackles bilaterally with scattered expiratory wheezing about the same, not in distress. On 2 L supplemental O2.  Cardiovascular: irregular, mild tachycardia, trace LE edema bilaterally  Abdomen: soft, non-distended, non tenderness, ecchymosis Rt lower abd to scrotum  Skin/Integumen: small ecchymosis noted right lower abdomen/scrotum      Medications   All medications I am responsible for were reviewed.    Warfarin Therapy Reminder         warfarin  4 mg Oral ONCE at 18:00     furosemide  40 mg Intravenous Daily     potassium chloride  10 mEq Oral BID     furosemide  20 mg Intravenous Daily     ipratropium - albuterol 0.5 mg/2.5 mg/3 mL  3 mL Nebulization 4x daily     metoprolol  100 mg Oral BID     oseltamivir (TAMIFLU) capsule 30 mg  30 mg Oral Daily     sodium chloride (PF)  3 mL Intracatheter Q8H     levothyroxine (SYNTHROID/LEVOTHROID) tablet 200 mcg  200 mcg Oral Daily     sennosides  1 tablet Oral At Bedtime        Data     Recent Labs  Lab 03/11/17  0735 03/10/17  0730 03/09/17  1745 03/09/17  0745  03/08/17  0600   WBC  --  7.6  --  7.3  --  7.7   HGB 10.5* 10.7*  --  10.7*  < > 11.0*   MCV  --  101*  --  101*  --  99   PLT  --  232  --  209  --  176   INR 1.50* 1.31* 1.23*  --   --  1.17*    136  --  136  --   --    POTASSIUM 2.8* 3.5  --  3.8  --   --    CHLORIDE 99 100  --  102  --   --    CO2 27 26  --  25  --   --    BUN 25 31*  --  30  --   --    CR 1.01 1.08   --  1.06  --   --    ANIONGAP 10 10  --  9  --   --    SARAH 8.1* 8.2*  --  8.2*  --   --    GLC 74 84  --  87  --   --    < > = values in this interval not displayed.    No results found for this or any previous visit (from the past 24 hour(s)).

## 2017-03-11 NOTE — PLAN OF CARE
Problem: Goal Outcome Summary  Goal: Goal Outcome Summary  Outcome: No Change  VSS, sats 2lpm, denies pain, up +1 walker. Voids+ incont at times.

## 2017-03-11 NOTE — PROVIDER NOTIFICATION
On call hospitalist called regarding pt's increased work of breathing, despite prn neb. Order entered per MD for one time lasix.

## 2017-03-11 NOTE — PLAN OF CARE
"Problem: Goal Outcome Summary  Goal: Goal Outcome Summary  PT:  Noted in chart that patient having \"bilateral crackles and elevated HR\".  Spoke with nursing who stated to defer.  Cancel PT this date.       "

## 2017-03-12 ENCOUNTER — APPOINTMENT (OUTPATIENT)
Dept: PHYSICAL THERAPY | Facility: CLINIC | Age: 82
DRG: 813 | End: 2017-03-12
Attending: INTERNAL MEDICINE
Payer: COMMERCIAL

## 2017-03-12 LAB
ANION GAP SERPL CALCULATED.3IONS-SCNC: 8 MMOL/L (ref 3–14)
BASOPHILS # BLD AUTO: 0 10E9/L (ref 0–0.2)
BASOPHILS NFR BLD AUTO: 0 %
BUN SERPL-MCNC: 27 MG/DL (ref 7–30)
CALCIUM SERPL-MCNC: 8.1 MG/DL (ref 8.5–10.1)
CHLORIDE SERPL-SCNC: 100 MMOL/L (ref 94–109)
CO2 SERPL-SCNC: 29 MMOL/L (ref 20–32)
CREAT SERPL-MCNC: 1.19 MG/DL (ref 0.66–1.25)
DIFFERENTIAL METHOD BLD: ABNORMAL
EOSINOPHIL # BLD AUTO: 0 10E9/L (ref 0–0.7)
EOSINOPHIL NFR BLD AUTO: 0 %
ERYTHROCYTE [DISTWIDTH] IN BLOOD BY AUTOMATED COUNT: 14.6 % (ref 10–15)
GFR SERPL CREATININE-BSD FRML MDRD: 57 ML/MIN/1.7M2
GLUCOSE SERPL-MCNC: 218 MG/DL (ref 70–99)
HCT VFR BLD AUTO: 32.1 % (ref 40–53)
HGB BLD-MCNC: 10.9 G/DL (ref 13.3–17.7)
IMM GRANULOCYTES # BLD: 0 10E9/L (ref 0–0.4)
IMM GRANULOCYTES NFR BLD: 0.2 %
INR PPP: 1.75 (ref 0.86–1.14)
LYMPHOCYTES # BLD AUTO: 0.3 10E9/L (ref 0.8–5.3)
LYMPHOCYTES NFR BLD AUTO: 5.1 %
MCH RBC QN AUTO: 34.3 PG (ref 26.5–33)
MCHC RBC AUTO-ENTMCNC: 34 G/DL (ref 31.5–36.5)
MCV RBC AUTO: 101 FL (ref 78–100)
MONOCYTES # BLD AUTO: 0.2 10E9/L (ref 0–1.3)
MONOCYTES NFR BLD AUTO: 3.3 %
NEUTROPHILS # BLD AUTO: 4.7 10E9/L (ref 1.6–8.3)
NEUTROPHILS NFR BLD AUTO: 91.4 %
NRBC # BLD AUTO: 0 10*3/UL
NRBC BLD AUTO-RTO: 1 /100
PLATELET # BLD AUTO: 300 10E9/L (ref 150–450)
POTASSIUM SERPL-SCNC: 4.1 MMOL/L (ref 3.4–5.3)
RBC # BLD AUTO: 3.18 10E12/L (ref 4.4–5.9)
SODIUM SERPL-SCNC: 137 MMOL/L (ref 133–144)
WBC # BLD AUTO: 5.1 10E9/L (ref 4–11)

## 2017-03-12 PROCEDURE — 25000128 H RX IP 250 OP 636: Performed by: HOSPITALIST

## 2017-03-12 PROCEDURE — 25000132 ZZH RX MED GY IP 250 OP 250 PS 637: Performed by: INTERNAL MEDICINE

## 2017-03-12 PROCEDURE — 25000132 ZZH RX MED GY IP 250 OP 250 PS 637: Performed by: PHYSICIAN ASSISTANT

## 2017-03-12 PROCEDURE — 36415 COLL VENOUS BLD VENIPUNCTURE: CPT | Performed by: HOSPITALIST

## 2017-03-12 PROCEDURE — 25000128 H RX IP 250 OP 636: Performed by: PHYSICIAN ASSISTANT

## 2017-03-12 PROCEDURE — 25000125 ZZHC RX 250: Performed by: HOSPITALIST

## 2017-03-12 PROCEDURE — 85025 COMPLETE CBC W/AUTO DIFF WBC: CPT | Performed by: HOSPITALIST

## 2017-03-12 PROCEDURE — 25000132 ZZH RX MED GY IP 250 OP 250 PS 637: Performed by: HOSPITALIST

## 2017-03-12 PROCEDURE — 80048 BASIC METABOLIC PNL TOTAL CA: CPT | Performed by: HOSPITALIST

## 2017-03-12 PROCEDURE — 99232 SBSQ HOSP IP/OBS MODERATE 35: CPT | Performed by: HOSPITALIST

## 2017-03-12 PROCEDURE — 97116 GAIT TRAINING THERAPY: CPT | Mod: GP | Performed by: PHYSICAL THERAPIST

## 2017-03-12 PROCEDURE — 40000915 ZZH STATISTIC SITTER, EVENING HOURS

## 2017-03-12 PROCEDURE — 40000275 ZZH STATISTIC RCP TIME EA 10 MIN

## 2017-03-12 PROCEDURE — 97530 THERAPEUTIC ACTIVITIES: CPT | Mod: GP | Performed by: PHYSICAL THERAPIST

## 2017-03-12 PROCEDURE — 85610 PROTHROMBIN TIME: CPT | Performed by: HOSPITALIST

## 2017-03-12 PROCEDURE — 40000193 ZZH STATISTIC PT WARD VISIT: Performed by: PHYSICAL THERAPIST

## 2017-03-12 PROCEDURE — 94640 AIRWAY INHALATION TREATMENT: CPT | Mod: 76

## 2017-03-12 PROCEDURE — 12000007 ZZH R&B INTERMEDIATE

## 2017-03-12 PROCEDURE — 94640 AIRWAY INHALATION TREATMENT: CPT

## 2017-03-12 RX ORDER — ALUMINA, MAGNESIA, AND SIMETHICONE 2400; 2400; 240 MG/30ML; MG/30ML; MG/30ML
30 SUSPENSION ORAL EVERY 4 HOURS PRN
Status: DISCONTINUED | OUTPATIENT
Start: 2017-03-12 | End: 2017-03-19 | Stop reason: HOSPADM

## 2017-03-12 RX ORDER — AZITHROMYCIN 250 MG/1
250 TABLET, FILM COATED ORAL DAILY
Status: DISCONTINUED | OUTPATIENT
Start: 2017-03-12 | End: 2017-03-13

## 2017-03-12 RX ORDER — PANTOPRAZOLE SODIUM 40 MG/1
40 TABLET, DELAYED RELEASE ORAL EVERY MORNING
Status: DISCONTINUED | OUTPATIENT
Start: 2017-03-12 | End: 2017-03-19 | Stop reason: HOSPADM

## 2017-03-12 RX ORDER — WARFARIN SODIUM 5 MG/1
5 TABLET ORAL
Status: COMPLETED | OUTPATIENT
Start: 2017-03-12 | End: 2017-03-12

## 2017-03-12 RX ADMIN — PANTOPRAZOLE SODIUM 40 MG: 40 TABLET, DELAYED RELEASE ORAL at 16:15

## 2017-03-12 RX ADMIN — GUAIFENESIN 10 ML: 100 SOLUTION ORAL at 11:26

## 2017-03-12 RX ADMIN — IPRATROPIUM BROMIDE AND ALBUTEROL SULFATE 3 ML: .5; 3 SOLUTION RESPIRATORY (INHALATION) at 19:49

## 2017-03-12 RX ADMIN — METOPROLOL TARTRATE 100 MG: 100 TABLET, FILM COATED ORAL at 22:03

## 2017-03-12 RX ADMIN — GUAIFENESIN 10 ML: 100 SOLUTION ORAL at 21:59

## 2017-03-12 RX ADMIN — OSELTAMIVIR PHOSPHATE 30 MG: 30 CAPSULE ORAL at 08:01

## 2017-03-12 RX ADMIN — Medication 1 SPRAY: at 11:28

## 2017-03-12 RX ADMIN — IPRATROPIUM BROMIDE AND ALBUTEROL SULFATE 3 ML: .5; 3 SOLUTION RESPIRATORY (INHALATION) at 16:00

## 2017-03-12 RX ADMIN — GUAIFENESIN 10 ML: 100 SOLUTION ORAL at 05:12

## 2017-03-12 RX ADMIN — AZITHROMYCIN 250 MG: 250 TABLET, FILM COATED ORAL at 14:58

## 2017-03-12 RX ADMIN — PIPERACILLIN SODIUM,TAZOBACTAM SODIUM 3.38 G: 3; .375 INJECTION, POWDER, FOR SOLUTION INTRAVENOUS at 00:21

## 2017-03-12 RX ADMIN — ONDANSETRON 4 MG: 2 SOLUTION INTRAMUSCULAR; INTRAVENOUS at 15:29

## 2017-03-12 RX ADMIN — WARFARIN SODIUM 5 MG: 5 TABLET ORAL at 19:09

## 2017-03-12 RX ADMIN — PIPERACILLIN SODIUM,TAZOBACTAM SODIUM 3.38 G: 3; .375 INJECTION, POWDER, FOR SOLUTION INTRAVENOUS at 19:09

## 2017-03-12 RX ADMIN — BENZONATATE 100 MG: 100 CAPSULE ORAL at 21:59

## 2017-03-12 RX ADMIN — POTASSIUM CHLORIDE 10 MEQ: 750 TABLET, EXTENDED RELEASE ORAL at 08:01

## 2017-03-12 RX ADMIN — BENZONATATE 100 MG: 100 CAPSULE ORAL at 11:26

## 2017-03-12 RX ADMIN — SENNOSIDES 1 TABLET: 8.6 TABLET, FILM COATED ORAL at 21:59

## 2017-03-12 RX ADMIN — PREDNISONE 40 MG: 20 TABLET ORAL at 08:01

## 2017-03-12 RX ADMIN — PIPERACILLIN SODIUM,TAZOBACTAM SODIUM 3.38 G: 3; .375 INJECTION, POWDER, FOR SOLUTION INTRAVENOUS at 11:29

## 2017-03-12 RX ADMIN — FUROSEMIDE 40 MG: 10 INJECTION, SOLUTION INTRAVENOUS at 07:57

## 2017-03-12 RX ADMIN — PIPERACILLIN SODIUM,TAZOBACTAM SODIUM 3.38 G: 3; .375 INJECTION, POWDER, FOR SOLUTION INTRAVENOUS at 06:32

## 2017-03-12 RX ADMIN — BENZONATATE 100 MG: 100 CAPSULE ORAL at 05:12

## 2017-03-12 RX ADMIN — IPRATROPIUM BROMIDE AND ALBUTEROL SULFATE 3 ML: .5; 3 SOLUTION RESPIRATORY (INHALATION) at 11:52

## 2017-03-12 RX ADMIN — LEVOTHYROXINE SODIUM 200 MCG: 100 TABLET ORAL at 06:32

## 2017-03-12 RX ADMIN — METOPROLOL TARTRATE 100 MG: 100 TABLET, FILM COATED ORAL at 08:01

## 2017-03-12 NOTE — PROGRESS NOTES
[facility]  Hospitalist Progress Note      Candi Kapoor MD  03/12/2017    Assessment & Plan      Mason Gonzalez is a 89 year old male with past medical history of atrial fibrillation on chronic anticoagulation, hypothyroidism and diet-controlled type 2 diabetes who presented from outside hospital for evaluation of an extraperitoneal hemorrhage.     Extraperitoneal hemorrhage, spontaneous bleed secondary to coagulopathy in the context of warfarin use.    The patient had a CT scan at outside facility which showed an 8.4 cm extraperitoneal hemorrhage in the right pelvis surrounding the bladder. Hemoglobin was stable at 13.6; however, INR was elevated at 5.8. S/p vitamin K at [facility].   Repeat CT abd/pelvis on admission showed increased size of extraperitoneal hematoma along the anterior aspect of the bladder which was measuring 11.6.   - received Kcentra, vitamin K and FFP upon admission  - serial hemoglobin down trended initially but stable now, 1057 this am  - Appreciate help, no plan for sugical interavention at this time, and okayed to resume warfarin 3/9.  - pain control: IV dilaudid prn, tramadol prn from PTA med     Acute blood loss anemia:  - secondary to above  - hgb 13 at outside hospital, down to 10.5 this am, stable last few check  - change hemoglobin checks to daily    Atrial fibrillation with RVR  This is likely secondary to missed metoprolol dose as well as worsened due to acute pain. Remains in RVR despite being placed back on his PTA meds  - INR was reversed as above, now back on warfarin.  - Metoprolol increased to 100 mg BID, now rate controlled.   - Metoprolol IV prn dose increased for sustained HR >120  - If goes into rapid a fib, likely will use digoxin instead of increasing BB given marked wheezing (as long as potassium level is better)    Hypothyroidism. Continue prior to admission Synthroid.     Diet controlled DM-II:  - was not using ISS so discontinued., BS  controlled.    Congestive heart failure, unspecified. The patient has documentation of CHF [facility]. No prior echocardiograms available.   - off fluids sicne 3/8. CXR from 3/8 shows some interstitial changes, and weight also noted up to 91 kg (87 on admission), receiving intermittent lasix, persistent wheezing and lungs very coarse.  - add scheduled duoneb (change to xopenex if goes in to RVR)  - started on lasix 40 mg in am and 20 mg in pm, hold as Cr trended up, neg 1.3 L in last 24 hours.  - follow I&O and daily weight.  - ECHO done this admission, normal LVEF.    Hypokalemia: On replacement protocol.  - normal now.    Suspected HCAP/atypical PNA: Persistent wheezing and dyspnea, has dry cough.  - Repeat CXR shows coarse Lt lung infiltrates worse than prior.  - blood cultures x2 drawn 3/11 and started on Zosyn and azithromycin.  - short course of steroid as well, decrease dose given reported some delirium could be due to steroid.    Obstructive sleep apnea, does not use CPAP.     Influenza prophylaxis. The patient was initiated on Tamiflu prophylaxis at [facility] for 10 days total, started 3/6. Continue.     Physical deconditioning:   - he is very weak at this time.   - PT eval noted.    Deep venous thrombosis prophylaxis: - PCDs/warfarin resumed      CODE STATUS: Full    Dispo: anticipate 2+ days when respiratory status improves, INR therapeutic, Hb remains stable.  Discussed with patient and RN. Called his daughter, unable to reach, will follow.    Interval History      Patient was seen and examined, still has cough and wheezing but has improved. Afebrile. Denies pain.  - was restless with episodes of confusion during night.     -Data reviewed today: I reviewed all new labs and imaging over the last 24 hours. I personally reviewed no images or EKG's today.      Physical Exam   Heart Rate: 87, Blood pressure 116/80, pulse 95, temperature 97.7  F (36.5  C), temperature  source Axillary, resp. rate 16, height 1.829 m (6'), weight 90.1 kg (198 lb 10.2 oz), SpO2 99 %.  Vitals:    03/09/17 0530 03/10/17 0600 03/11/17 0630   Weight: 91.3 kg (201 lb 4.5 oz) 89.6 kg (197 lb 8.5 oz) 90.1 kg (198 lb 10.2 oz)     Vital Signs with Ranges  Temp:  [97.2  F (36.2  C)-97.8  F (36.6  C)] 97.7  F (36.5  C)  Pulse:  [] 95  Heart Rate:  [] 87  Resp:  [16-22] 16  BP: ()/(62-82) 116/80  SpO2:  [74 %-99 %] 99 %  I/O's Last 24 hours  I/O last 3 completed shifts:  In: 150 [P.O.:150]  Out: 1505 [Urine:1505]    Constitutional: Alert, awake, up in chair, and no apparent distress   HEENT: PERRLA, EOMI, mucosa dry now  Respiratory: Coarse b/l with wheezes much improved now. Not in distress. On 2 L supplemental O2.  Cardiovascular: irregular, no tachycardia, trace LE edema bilaterally  Abdomen: soft, non-distended, non tenderness, ecchymosis Rt lower abd to scrotum stable  Skin/Integumen: small ecchymosis noted right lower abdomen/scrotum      Medications   All medications I am responsible for were reviewed.    Warfarin Therapy Reminder         azithromycin  250 mg Oral Daily     [START ON 3/13/2017] predniSONE  30 mg Oral Daily     furosemide  40 mg Intravenous Daily     potassium chloride  10 mEq Oral BID     piperacillin-tazobactam  3.375 g Intravenous Q6H     ipratropium - albuterol 0.5 mg/2.5 mg/3 mL  3 mL Nebulization 4x daily     metoprolol  100 mg Oral BID     oseltamivir (TAMIFLU) capsule 30 mg  30 mg Oral Daily     sodium chloride (PF)  3 mL Intracatheter Q8H     levothyroxine (SYNTHROID/LEVOTHROID) tablet 200 mcg  200 mcg Oral Daily     sennosides  1 tablet Oral At Bedtime        Data     Recent Labs  Lab 03/12/17  0736 03/11/17  2115 03/11/17  1802 03/11/17  0735 03/10/17  0730   WBC 5.1  --  8.0  --  7.6   HGB 10.9*  --  11.0* 10.5* 10.7*   *  --  100  --  101*     --  310  --  232   INR 1.75*  --   --  1.50* 1.31*     --   --  136 136   POTASSIUM 4.1 3.8  --   2.8* 3.5   CHLORIDE 100  --   --  99 100   CO2 29  --   --  27 26   BUN 27  --   --  25 31*   CR 1.19  --   --  1.01 1.08   ANIONGAP 8  --   --  10 10   SARAH 8.1*  --   --  8.1* 8.2*   *  --   --  74 84       Recent Results (from the past 24 hour(s))   XR Chest 2 Views    Narrative    CHEST TWO VIEW 3/11/2017 4:30 PM     COMPARISON: Frontal chest x-ray 3/8/2017.    HISTORY: Persistent dyspnea and wheezing. Fluid overload.      Impression    IMPRESSION: Elevated right hemidiaphragm again noted. Coarse  hyperdensity throughout the left lung again noted and slightly  worsened since the comparison study raising the question of worsening  pneumonia. The right lung demonstrates fine interstitial prominence  that likely represents chronic fibrotic change; there is no evidence  for pneumonia on the right. There is no pleural effusion or  pneumothorax. Heart size is normal to slightly enlarged. There is no  definite evidence for congestive failure.    NAYLA SUN MD

## 2017-03-12 NOTE — PLAN OF CARE
"Problem: Goal Outcome Summary  Goal: Goal Outcome Summary  PT:  Patient was sitting in chair in doorway of his room upon PT arriving; nursing providing SBA.  Patient stating he now wants to return to bed.  CGA for sit to stand.  Ambulated 35' with WW and CGA to Min A, very forward flexed on WW and nursing providing assistance for O2 tank.  Patient at 98% on 3L.  Returned to supine with SBA but stating he feels \"terrible\" though unable to indicate exactly what is feeling \"terrible\".  Tired and wanting to rest.  Agree with recommended plan to discharge back to LTC with PT as indicated at discharge.       "

## 2017-03-12 NOTE — PLAN OF CARE
Problem: Goal Outcome Summary  Goal: Goal Outcome Summary  Outcome: Improving  Lungs clearer with less wheezing today, cough persists with tessalon and robitussin given for relief, afib with rvr, tolerating po, needs 2 liters oxygen, [place] with hearing aids in his ears, biotene spray for dry mouth. Patient verbalized that he does not want to be alone, allowed to sit at nursing station in recliner and then wheelchair, presently daughter visiting with pt in the patient lounge. Earlier patiet was moved into room 332-2.

## 2017-03-12 NOTE — PLAN OF CARE
Problem: Goal Outcome Summary  Goal: Goal Outcome Summary  Outcome: No Change  Intermittent episodes of confusion noted throughout the noc. Restless at times unable to determine what he wants to do, denies pain.

## 2017-03-12 NOTE — PLAN OF CARE
Problem: Goal Outcome Summary  Goal: Goal Outcome Summary  Outcome: No Change  Potassium replaced recheck pending, HR 130s prn metoprolol givenx2 with good result, po pain ok, fair appetite, voids+ sats 2lpm course LS freq non productive cough, up +2 +walker+belt.Tele a-fib cvr now

## 2017-03-13 ENCOUNTER — APPOINTMENT (OUTPATIENT)
Dept: PHYSICAL THERAPY | Facility: CLINIC | Age: 82
DRG: 813 | End: 2017-03-13
Attending: INTERNAL MEDICINE
Payer: COMMERCIAL

## 2017-03-13 LAB
ANION GAP SERPL CALCULATED.3IONS-SCNC: 9 MMOL/L (ref 3–14)
BUN SERPL-MCNC: 36 MG/DL (ref 7–30)
CALCIUM SERPL-MCNC: 8.4 MG/DL (ref 8.5–10.1)
CHLORIDE SERPL-SCNC: 97 MMOL/L (ref 94–109)
CO2 SERPL-SCNC: 29 MMOL/L (ref 20–32)
CREAT SERPL-MCNC: 1.28 MG/DL (ref 0.66–1.25)
GFR SERPL CREATININE-BSD FRML MDRD: 53 ML/MIN/1.7M2
GLUCOSE SERPL-MCNC: 186 MG/DL (ref 70–99)
HCT VFR BLD AUTO: 31.3 % (ref 40–53)
HGB BLD-MCNC: 10.8 G/DL (ref 13.3–17.7)
INR PPP: 2.28 (ref 0.86–1.14)
LACTATE BLD-SCNC: 2.5 MMOL/L (ref 0.7–2.1)
MAGNESIUM SERPL-MCNC: 2 MG/DL (ref 1.6–2.3)
POTASSIUM SERPL-SCNC: 3.8 MMOL/L (ref 3.4–5.3)
SODIUM SERPL-SCNC: 135 MMOL/L (ref 133–144)

## 2017-03-13 PROCEDURE — 40000275 ZZH STATISTIC RCP TIME EA 10 MIN

## 2017-03-13 PROCEDURE — 25000132 ZZH RX MED GY IP 250 OP 250 PS 637: Performed by: PHYSICIAN ASSISTANT

## 2017-03-13 PROCEDURE — 83605 ASSAY OF LACTIC ACID: CPT | Performed by: HOSPITALIST

## 2017-03-13 PROCEDURE — 25000132 ZZH RX MED GY IP 250 OP 250 PS 637: Performed by: INTERNAL MEDICINE

## 2017-03-13 PROCEDURE — 25000132 ZZH RX MED GY IP 250 OP 250 PS 637: Performed by: HOSPITALIST

## 2017-03-13 PROCEDURE — 85610 PROTHROMBIN TIME: CPT | Performed by: INTERNAL MEDICINE

## 2017-03-13 PROCEDURE — 99232 SBSQ HOSP IP/OBS MODERATE 35: CPT | Performed by: HOSPITALIST

## 2017-03-13 PROCEDURE — 40000193 ZZH STATISTIC PT WARD VISIT: Performed by: PHYSICAL THERAPIST

## 2017-03-13 PROCEDURE — 25000125 ZZHC RX 250: Performed by: HOSPITALIST

## 2017-03-13 PROCEDURE — 25000128 H RX IP 250 OP 636: Performed by: HOSPITALIST

## 2017-03-13 PROCEDURE — 36415 COLL VENOUS BLD VENIPUNCTURE: CPT | Performed by: INTERNAL MEDICINE

## 2017-03-13 PROCEDURE — 94640 AIRWAY INHALATION TREATMENT: CPT

## 2017-03-13 PROCEDURE — 94640 AIRWAY INHALATION TREATMENT: CPT | Mod: 76

## 2017-03-13 PROCEDURE — 83735 ASSAY OF MAGNESIUM: CPT | Performed by: INTERNAL MEDICINE

## 2017-03-13 PROCEDURE — 25000128 H RX IP 250 OP 636: Performed by: INTERNAL MEDICINE

## 2017-03-13 PROCEDURE — 12000007 ZZH R&B INTERMEDIATE

## 2017-03-13 PROCEDURE — 85018 HEMOGLOBIN: CPT | Performed by: INTERNAL MEDICINE

## 2017-03-13 PROCEDURE — 85014 HEMATOCRIT: CPT | Performed by: INTERNAL MEDICINE

## 2017-03-13 PROCEDURE — 36415 COLL VENOUS BLD VENIPUNCTURE: CPT | Performed by: HOSPITALIST

## 2017-03-13 PROCEDURE — 97530 THERAPEUTIC ACTIVITIES: CPT | Mod: GP | Performed by: PHYSICAL THERAPIST

## 2017-03-13 PROCEDURE — 97110 THERAPEUTIC EXERCISES: CPT | Mod: GP | Performed by: PHYSICAL THERAPIST

## 2017-03-13 PROCEDURE — 25000125 ZZHC RX 250: Performed by: PHYSICIAN ASSISTANT

## 2017-03-13 PROCEDURE — 80048 BASIC METABOLIC PNL TOTAL CA: CPT | Performed by: INTERNAL MEDICINE

## 2017-03-13 PROCEDURE — 40000916 ZZH STATISTIC SITTER, NIGHT HOURS

## 2017-03-13 RX ORDER — DIGOXIN 0.25 MG/ML
125 INJECTION INTRAMUSCULAR; INTRAVENOUS EVERY 6 HOURS
Status: COMPLETED | OUTPATIENT
Start: 2017-03-13 | End: 2017-03-13

## 2017-03-13 RX ORDER — POTASSIUM CHLORIDE 1500 MG/1
20 TABLET, EXTENDED RELEASE ORAL DAILY
Status: DISCONTINUED | OUTPATIENT
Start: 2017-03-13 | End: 2017-03-15

## 2017-03-13 RX ORDER — DIGOXIN 125 MCG
125 TABLET ORAL DAILY
Status: DISCONTINUED | OUTPATIENT
Start: 2017-03-14 | End: 2017-03-19 | Stop reason: HOSPADM

## 2017-03-13 RX ORDER — FUROSEMIDE 40 MG
40 TABLET ORAL DAILY
Status: DISCONTINUED | OUTPATIENT
Start: 2017-03-13 | End: 2017-03-13

## 2017-03-13 RX ADMIN — POTASSIUM CHLORIDE 20 MEQ: 1500 TABLET, EXTENDED RELEASE ORAL at 14:28

## 2017-03-13 RX ADMIN — METOPROLOL TARTRATE 100 MG: 100 TABLET, FILM COATED ORAL at 21:29

## 2017-03-13 RX ADMIN — IPRATROPIUM BROMIDE AND ALBUTEROL SULFATE 3 ML: .5; 3 SOLUTION RESPIRATORY (INHALATION) at 16:06

## 2017-03-13 RX ADMIN — FUROSEMIDE 40 MG: 40 TABLET ORAL at 09:14

## 2017-03-13 RX ADMIN — PIPERACILLIN SODIUM,TAZOBACTAM SODIUM 3.38 G: 3; .375 INJECTION, POWDER, FOR SOLUTION INTRAVENOUS at 19:47

## 2017-03-13 RX ADMIN — DIGOXIN 125 MCG: 0.25 INJECTION INTRAMUSCULAR; INTRAVENOUS at 19:46

## 2017-03-13 RX ADMIN — SENNOSIDES 1 TABLET: 8.6 TABLET, FILM COATED ORAL at 21:30

## 2017-03-13 RX ADMIN — GUAIFENESIN 10 ML: 100 SOLUTION ORAL at 01:42

## 2017-03-13 RX ADMIN — PIPERACILLIN SODIUM,TAZOBACTAM SODIUM 3.38 G: 3; .375 INJECTION, POWDER, FOR SOLUTION INTRAVENOUS at 14:27

## 2017-03-13 RX ADMIN — DIGOXIN 125 MCG: 0.25 INJECTION INTRAMUSCULAR; INTRAVENOUS at 14:24

## 2017-03-13 RX ADMIN — PROCHLORPERAZINE MALEATE 5 MG: 5 TABLET, FILM COATED ORAL at 19:46

## 2017-03-13 RX ADMIN — SODIUM CHLORIDE 500 ML: 9 INJECTION, SOLUTION INTRAVENOUS at 23:26

## 2017-03-13 RX ADMIN — ALUMINUM HYDROXIDE, MAGNESIUM HYDROXIDE, AND DIMETHICONE 30 ML: 400; 400; 40 SUSPENSION ORAL at 15:14

## 2017-03-13 RX ADMIN — IPRATROPIUM BROMIDE AND ALBUTEROL SULFATE 3 ML: .5; 3 SOLUTION RESPIRATORY (INHALATION) at 11:47

## 2017-03-13 RX ADMIN — PIPERACILLIN SODIUM,TAZOBACTAM SODIUM 3.38 G: 3; .375 INJECTION, POWDER, FOR SOLUTION INTRAVENOUS at 02:39

## 2017-03-13 RX ADMIN — PREDNISONE 30 MG: 5 TABLET ORAL at 09:13

## 2017-03-13 RX ADMIN — METOPROLOL TARTRATE 100 MG: 100 TABLET, FILM COATED ORAL at 09:13

## 2017-03-13 RX ADMIN — PANTOPRAZOLE SODIUM 40 MG: 40 TABLET, DELAYED RELEASE ORAL at 09:13

## 2017-03-13 RX ADMIN — ONDANSETRON 4 MG: 4 TABLET, ORALLY DISINTEGRATING ORAL at 13:35

## 2017-03-13 RX ADMIN — ACETAMINOPHEN 650 MG: 325 TABLET, FILM COATED ORAL at 13:35

## 2017-03-13 RX ADMIN — Medication 12.5 MG: at 21:31

## 2017-03-13 RX ADMIN — LEVOTHYROXINE SODIUM 200 MCG: 100 TABLET ORAL at 06:06

## 2017-03-13 RX ADMIN — PIPERACILLIN SODIUM,TAZOBACTAM SODIUM 3.38 G: 3; .375 INJECTION, POWDER, FOR SOLUTION INTRAVENOUS at 09:03

## 2017-03-13 RX ADMIN — IPRATROPIUM BROMIDE AND ALBUTEROL SULFATE 3 ML: .5; 3 SOLUTION RESPIRATORY (INHALATION) at 08:47

## 2017-03-13 RX ADMIN — OSELTAMIVIR PHOSPHATE 30 MG: 30 CAPSULE ORAL at 09:14

## 2017-03-13 RX ADMIN — IPRATROPIUM BROMIDE AND ALBUTEROL SULFATE 3 ML: .5; 3 SOLUTION RESPIRATORY (INHALATION) at 19:57

## 2017-03-13 NOTE — PLAN OF CARE
Problem: Goal Outcome Summary  Goal: Goal Outcome Summary  Outcome: No Change  VSS, on 2 L O2. Tele - A fib CVR. Disoriented to time and place. Restless all night, sitter by the bedside. Freq pulling at lines. Bruising to hip and lower abdomen. Frequent cough, LS dim with crackles. Denies any pain. New IV intact in left forearm. Up with assist of 2. Voiding in urinal at bedside.

## 2017-03-13 NOTE — PLAN OF CARE
Problem: Goal Outcome Summary  Goal: Goal Outcome Summary  Outcome: No Change  VSS. Disoriented to year. Pt restless and agitated. Sitter at bedside. Up with 2, GB and walker. 2L O2. HH. Using urinal. Tele Afib RVR.  Will continue to monitor.

## 2017-03-13 NOTE — PROGRESS NOTES
[facility]  Hospitalist Progress Note    Candi Kapoor MD  03/13/2017    Assessment & Plan      Mason Gonzalez is a 89 year old male with past medical history of atrial fibrillation on chronic anticoagulation, hypothyroidism and diet-controlled type 2 diabetes who presented from outside hospital for evaluation of an extraperitoneal hemorrhage and was admitted on 3/7/17.     Extraperitoneal hemorrhage, spontaneous bleed secondary to coagulopathy in the context of warfarin use.    The patient had a CT scan at outside facility which showed an 8.4 cm extraperitoneal hemorrhage in the right pelvis surrounding the bladder. Hemoglobin was stable at 13.6; however, INR was elevated at 5.8. S/p vitamin K at [facility].   Repeat CT abd/pelvis on admission showed increased size of extraperitoneal hematoma along the anterior aspect of the bladder which was measuring 11.6.   - received Kcentra, vitamin K and FFP upon admission  - serial hemoglobin down trended initially but stable now, 1057 this am  - Appreciate help, no plan for sugical interavention at this time, and okayed to resume warfarin 3/9.  - pain controlled with tramadol prn from PTA med    Acute blood loss anemia:  - secondary to above  - hgb 13 at outside hospital, down to 10 range and stable last few check  - follow hemoglobin daily while in hospital    Atrial fibrillation with RVR  This is likely secondary to missed metoprolol dose as well as worsened due to acute pain. Remains in RVR despite being placed back on his PTA meds  - INR was reversed as above, now back on warfarin and INR is therapeutic today.  - Metoprolol increased to 100 mg BID, but HR still not adequately controlled, resting upto 110s worse with activity, so will give 2 doses of digoxin 0.125 iv today and continue daily. New medication, needs level in 5-7 days.  - Metoprolol IV prn dose increased for sustained HR >120    Hypothyroidism. Continue prior to admission  Synthroid.     Diet controlled DM-II:  - was not using ISS so discontinued., BS controlled.    Congestive heart failure, unspecified. The patient has documentation of CHF [facility]. No prior echocardiograms available.   - off fluids sicne 3/8. CXR from 3/8 showed some interstitial changes, and weight also noted up to 91 kg (87 on admission), receiving intermittent lasix, persistent wheezing and lungs very coarse.  - add scheduled duoneb    - Started on lasix 40 mg in am and 20 mg in pm, changed to 40 mg PO daily(PTA dose) today with scheduled potassium supplement.   - follow I&O and daily weight- down to 86.6 from 90kg, incontinnent of urine  - ECHO done this admission, normal LVEF.    Hypokalemia: On replacement protocol.  - normal now.  - scheduled potassium with lasix.    Suspected HCAP/atypical PNA: Persistent severe wheezing and dyspnea, has dry cough.  - Repeat CXR 3/11 showed coarse Lt lung infiltrates worse than prior.  - blood cultures x2 drawn 3/11 and started on Zosyn and azithromycin, change to augmentin at dc.  - short course of steroid as well, dose decreased to 30 mg today given reported some delirium could be due to steroid.    Obstructive sleep apnea, does not use CPAP.     Influenza prophylaxis. The patient was initiated on Tamiflu prophylaxis at long-term care facility for 10 days total, started 3/6. Complete course.     Physical deconditioning:   - he is very weak at this time.   - PT eval noted. Plan is to dc back to LTC    Deep venous thrombosis prophylaxis: - PCDs/warfarin resumed      CODE STATUS: Full    Dispo: anticipate 1-2  days if resp status stable on PO lasix and HR controlled with addition of digoxin. Discussed with patient and RN. Also discussed with his daughter Kandis over the phone.    Interval History      Patient was seen and examined, per RN, patient gets restless when he is alone in room so a sitter has been provided (no agitation/hallucination).  -  cough better, wheezing improved.  - Afebrile. Denies pain.  - HR better but still mildly tachycardic.  - he took the O2 off and SPO2 is in 90s so supplemental O2 discontinued.    -Data reviewed today: I reviewed all new labs and imaging over the last 24 hours. I personally reviewed no images or EKG's today.      Physical Exam   Heart Rate: 109, Blood pressure 118/76, pulse 118, temperature 97.8  F (36.6  C), temperature source Oral, resp. rate 18, height 1.829 m (6'), weight 86.6 kg (190 lb 14.7 oz), SpO2 96 %.  Vitals:    03/10/17 0600 03/11/17 0630 03/13/17 0329   Weight: 89.6 kg (197 lb 8.5 oz) 90.1 kg (198 lb 10.2 oz) 86.6 kg (190 lb 14.7 oz)     Vital Signs with Ranges  Temp:  [97.3  F (36.3  C)-97.8  F (36.6  C)] 97.8  F (36.6  C)  Pulse:  [111-130] 118  Heart Rate:  [] 109  Resp:  [16-18] 18  BP: (107-124)/(62-76) 118/76  SpO2:  [95 %-100 %] 96 %  I/O's Last 24 hours  I/O last 3 completed shifts:  In: 1190 [P.O.:1190]  Out: 650 [Urine:650]    Constitutional: Alert, awake, and no apparent distress receiving neb now  HEENT: PERRLA, EOMI, mucosa dry now  Respiratory: Coarse b/l but no wheezing now. Not in distress. On room air.  Cardiovascular: irregular, no tachycardia, trace LE edema bilaterally  Abdomen: soft, non-distended, non tenderness, ecchymosis Rt lower abd to scrotum stable  Skin/Integumen:  Ecchymosis  right lower abdomen/scrotum stable, non tender.      Medications   All medications I am responsible for were reviewed.       Warfarin Therapy Reminder         furosemide  40 mg Oral Daily     warfarin-No DOSE today  1 each Does not apply no dose today (warfarin)     digoxin  125 mcg Intravenous Q6H     [START ON 3/14/2017] digoxin  125 mcg Oral Daily     azithromycin  250 mg Oral Daily     predniSONE  30 mg Oral Daily     pantoprazole  40 mg Oral QAM     piperacillin-tazobactam  3.375 g Intravenous Q6H     ipratropium - albuterol 0.5 mg/2.5 mg/3 mL  3 mL Nebulization 4x daily     metoprolol  100  mg Oral BID     oseltamivir (TAMIFLU) capsule 30 mg  30 mg Oral Daily     sodium chloride (PF)  3 mL Intracatheter Q8H     levothyroxine (SYNTHROID/LEVOTHROID) tablet 200 mcg  200 mcg Oral Daily     sennosides  1 tablet Oral At Bedtime        Data     Recent Labs  Lab 03/13/17  1148 03/12/17  0736 03/11/17  2115 03/11/17  1802 03/11/17  0735 03/10/17  0730   WBC  --  5.1  --  8.0  --  7.6   HGB 10.8* 10.9*  --  11.0* 10.5* 10.7*   MCV  --  101*  --  100  --  101*   PLT  --  300  --  310  --  232   INR 2.28* 1.75*  --   --  1.50* 1.31*    137  --   --  136 136   POTASSIUM 3.8 4.1 3.8  --  2.8* 3.5   CHLORIDE 97 100  --   --  99 100   CO2 29 29  --   --  27 26   BUN 36* 27  --   --  25 31*   CR 1.28* 1.19  --   --  1.01 1.08   ANIONGAP 9 8  --   --  10 10   SARAH 8.4* 8.1*  --   --  8.1* 8.2*   * 218*  --   --  74 84       No results found for this or any previous visit (from the past 24 hour(s)).

## 2017-03-13 NOTE — PLAN OF CARE
Problem: Goal Outcome Summary  Goal: Goal Outcome Summary  PT: Pt demos proximal weakness and poor activity tolerance, refusing any gait despite encouragement. Min A x 1 for sit <> stand from EOB to FWW. Tolerated standing at FWW a max of 4-5 min before LE fatigue with knee instability and requiring return to sit. SBA for supine > sit and repositioning in supine. Completed supine LE strengthening, demonstrating antigravity strength but fatigues quickly with reps indicating poor endurance.  Recommend: TCU

## 2017-03-13 NOTE — PROGRESS NOTES
X-cover   Patient complaining of severe anxiety. Not acutely confused but thinks year is 2016, so may have mild dementia.   Ordered seroquel 12.5-25 mg PO TID PRN for anxiety.

## 2017-03-14 ENCOUNTER — APPOINTMENT (OUTPATIENT)
Dept: PHYSICAL THERAPY | Facility: CLINIC | Age: 82
DRG: 813 | End: 2017-03-14
Attending: INTERNAL MEDICINE
Payer: COMMERCIAL

## 2017-03-14 LAB — INR PPP: 2.62 (ref 0.86–1.14)

## 2017-03-14 PROCEDURE — 25000132 ZZH RX MED GY IP 250 OP 250 PS 637: Performed by: PHYSICIAN ASSISTANT

## 2017-03-14 PROCEDURE — 40000275 ZZH STATISTIC RCP TIME EA 10 MIN

## 2017-03-14 PROCEDURE — 25000125 ZZHC RX 250: Performed by: HOSPITALIST

## 2017-03-14 PROCEDURE — 94640 AIRWAY INHALATION TREATMENT: CPT | Mod: 76

## 2017-03-14 PROCEDURE — 99232 SBSQ HOSP IP/OBS MODERATE 35: CPT | Performed by: INTERNAL MEDICINE

## 2017-03-14 PROCEDURE — 97530 THERAPEUTIC ACTIVITIES: CPT | Mod: GP

## 2017-03-14 PROCEDURE — 25000132 ZZH RX MED GY IP 250 OP 250 PS 637: Performed by: INTERNAL MEDICINE

## 2017-03-14 PROCEDURE — 36415 COLL VENOUS BLD VENIPUNCTURE: CPT | Performed by: INTERNAL MEDICINE

## 2017-03-14 PROCEDURE — 25000128 H RX IP 250 OP 636: Performed by: HOSPITALIST

## 2017-03-14 PROCEDURE — 85610 PROTHROMBIN TIME: CPT | Performed by: INTERNAL MEDICINE

## 2017-03-14 PROCEDURE — 25000125 ZZHC RX 250: Performed by: PHYSICIAN ASSISTANT

## 2017-03-14 PROCEDURE — 12000007 ZZH R&B INTERMEDIATE

## 2017-03-14 PROCEDURE — 25000132 ZZH RX MED GY IP 250 OP 250 PS 637: Performed by: HOSPITALIST

## 2017-03-14 PROCEDURE — 94640 AIRWAY INHALATION TREATMENT: CPT

## 2017-03-14 PROCEDURE — 40000193 ZZH STATISTIC PT WARD VISIT

## 2017-03-14 PROCEDURE — 25000132 ZZH RX MED GY IP 250 OP 250 PS 637

## 2017-03-14 RX ORDER — POT SOR/HE-CELLULOS/POV/HYALUR
10 GEL IN PACKET (ML) MUCOUS MEMBRANE EVERY 6 HOURS PRN
Status: DISCONTINUED | OUTPATIENT
Start: 2017-03-14 | End: 2017-03-19 | Stop reason: HOSPADM

## 2017-03-14 RX ORDER — POLYETHYLENE GLYCOL 3350 17 G/17G
17 POWDER, FOR SOLUTION ORAL 2 TIMES DAILY PRN
Status: DISCONTINUED | OUTPATIENT
Start: 2017-03-14 | End: 2017-03-19 | Stop reason: HOSPADM

## 2017-03-14 RX ORDER — MIRTAZAPINE 15 MG/1
15 TABLET, FILM COATED ORAL AT BEDTIME
Status: DISCONTINUED | OUTPATIENT
Start: 2017-03-14 | End: 2017-03-19 | Stop reason: HOSPADM

## 2017-03-14 RX ADMIN — PIPERACILLIN SODIUM,TAZOBACTAM SODIUM 3.38 G: 3; .375 INJECTION, POWDER, FOR SOLUTION INTRAVENOUS at 20:42

## 2017-03-14 RX ADMIN — GUAIFENESIN 10 ML: 100 SOLUTION ORAL at 19:55

## 2017-03-14 RX ADMIN — SENNOSIDES 1 TABLET: 8.6 TABLET, FILM COATED ORAL at 21:35

## 2017-03-14 RX ADMIN — PIPERACILLIN SODIUM,TAZOBACTAM SODIUM 3.38 G: 3; .375 INJECTION, POWDER, FOR SOLUTION INTRAVENOUS at 14:42

## 2017-03-14 RX ADMIN — POTASSIUM CHLORIDE 20 MEQ: 1500 TABLET, EXTENDED RELEASE ORAL at 09:53

## 2017-03-14 RX ADMIN — Medication 25 MG: at 23:13

## 2017-03-14 RX ADMIN — ACETAMINOPHEN 650 MG: 325 TABLET, FILM COATED ORAL at 19:42

## 2017-03-14 RX ADMIN — MIRTAZAPINE 15 MG: 15 TABLET, FILM COATED ORAL at 21:35

## 2017-03-14 RX ADMIN — ONDANSETRON 4 MG: 4 TABLET, ORALLY DISINTEGRATING ORAL at 13:50

## 2017-03-14 RX ADMIN — PANTOPRAZOLE SODIUM 40 MG: 40 TABLET, DELAYED RELEASE ORAL at 09:52

## 2017-03-14 RX ADMIN — PIPERACILLIN SODIUM,TAZOBACTAM SODIUM 3.38 G: 3; .375 INJECTION, POWDER, FOR SOLUTION INTRAVENOUS at 01:31

## 2017-03-14 RX ADMIN — Medication 12.5 MG: at 14:41

## 2017-03-14 RX ADMIN — PREDNISONE 30 MG: 5 TABLET ORAL at 09:50

## 2017-03-14 RX ADMIN — PIPERACILLIN SODIUM,TAZOBACTAM SODIUM 3.38 G: 3; .375 INJECTION, POWDER, FOR SOLUTION INTRAVENOUS at 09:32

## 2017-03-14 RX ADMIN — IPRATROPIUM BROMIDE AND ALBUTEROL SULFATE 3 ML: .5; 3 SOLUTION RESPIRATORY (INHALATION) at 20:05

## 2017-03-14 RX ADMIN — ALUMINUM HYDROXIDE, MAGNESIUM HYDROXIDE, AND DIMETHICONE 30 ML: 400; 400; 40 SUSPENSION ORAL at 13:09

## 2017-03-14 RX ADMIN — IPRATROPIUM BROMIDE AND ALBUTEROL SULFATE 3 ML: .5; 3 SOLUTION RESPIRATORY (INHALATION) at 08:22

## 2017-03-14 RX ADMIN — POLYETHYLENE GLYCOL 3350 17 G: 17 POWDER, FOR SOLUTION ORAL at 19:03

## 2017-03-14 RX ADMIN — Medication 10 ML: at 18:45

## 2017-03-14 RX ADMIN — Medication 1 LOZENGE: at 13:25

## 2017-03-14 RX ADMIN — Medication 1 SPRAY: at 09:39

## 2017-03-14 RX ADMIN — Medication 25 MG: at 02:11

## 2017-03-14 RX ADMIN — METOPROLOL TARTRATE 100 MG: 100 TABLET, FILM COATED ORAL at 21:35

## 2017-03-14 RX ADMIN — METOPROLOL TARTRATE 100 MG: 100 TABLET, FILM COATED ORAL at 09:52

## 2017-03-14 RX ADMIN — OSELTAMIVIR PHOSPHATE 30 MG: 30 CAPSULE ORAL at 09:54

## 2017-03-14 RX ADMIN — IPRATROPIUM BROMIDE AND ALBUTEROL SULFATE 3 ML: .5; 3 SOLUTION RESPIRATORY (INHALATION) at 11:36

## 2017-03-14 RX ADMIN — DIGOXIN 125 MCG: 125 TABLET ORAL at 09:54

## 2017-03-14 NOTE — PROGRESS NOTES
BRIEF NUTRITION ASSESSMENT      REASON FOR ASSESSMENT:  LOS    NUTRITION HISTORY:  Deferred - pt asleep, aid asked me not to wake him as he is confused and can get restless.  Pt resides in LTC. Per H&P he had abd pain and decreased intake 4-5 days PTA.    CURRENT DIET AND INTAKE:  Diet:  M-carb, 2 gm Na.            Per flow sheets, eating 50-75%, increased from admit.    ANTHROPOMETRICS:  Height: 6'  Weight: 86.8 kg  BMI: 25.95 kg/m2  IBW:  81 kg +/- 10%  Weight Status: Overweight BMI 25-29.9  %IBW: 107%  Weight History: Deferred    LABS:  Labs noted    MALNUTRITION:  Patient does not meet two of the following criteria necessary for diagnosing malnutrition: significant weight loss, reduced intake, subcutaneous fat loss, muscle loss or fluid retention    NUTRITION INTERVENTION:  Nutrition Diagnosis:  No nutrition diagnosis at this time.    Implementation:  Nutrition Education:  Per Provider order if indicated  Will send Plus2 shakes BID between meals as pt has been ordering ice cream often    FOLLOW UP/MONITORING:   Will re-evaluate in 7 days, or sooner, if re-consulted.    Zuly Brady RD  Pager [phone] (M-F)            [phone] (W/E & Hol)

## 2017-03-14 NOTE — PROGRESS NOTES
[facility]    Sepsis Evaluation Progress Note    Date of Service: 03/13/2017    I was called to see Mason Gonzalez due to abnormal vital signs triggering the Sepsis SIRS screening alert. He is known to have an infection.     Physical Exam    Vital Signs:  Temp: 99.1  F (37.3  C) Temp src: Oral BP: 103/64 Pulse: 91 Heart Rate: 116 (RN notified) Resp: 24 (RN notified) SpO2: 94 % O2 Device: None (Room air)      Lab:  Lactic Acid   Date Value Ref Range Status   03/13/2017 2.5 (H) 0.7 - 2.1 mmol/L Final       The patient is at baseline mental status.    The rest of their physical exam is significant for a comfortable appearing elderly male.  HR irregular rate and rhythm, rate ~100. BP 97 systolic. lungs with few scattered crackles, most notable in LLL. No wheezes (improved from prior notes).     Assessment and Plan    The SIRS and exam findings are likely due to afib with RVR, there is no sign of sepsis at this time.    Disposition: The patient will remain on the current unit. We will continue to monitor this patient closely. 500 ml NS bolus x1. Lasix has been DCd (to be reassessed in AM given decreased UOP and increase in lactic acid.    Wayne Inman MD

## 2017-03-14 NOTE — PLAN OF CARE
Problem: Goal Outcome Summary  Goal: Goal Outcome Summary  Outcome: No Change  Pt alert but forgetful and confused. Restless.  VSS on RA. Tele: A fib w/ CVR. IV antibiotics. +bowel sounds. Up with 2 and walker. Significant bruising around hips. Frequent, nonproductive cough. Lactic was 2.5.   Continue to monitor.

## 2017-03-14 NOTE — PLAN OF CARE
Problem: Goal Outcome Summary  Goal: Goal Outcome Summary  Outcome: No Change  VSS, disoriented to time, a little forgetful. Frequent cough, nonproductive. C/o nausea, and abd discomfort. Zofran given at 1400. Seroquel x1 for restlessness. Home Remeron to be restarted tonight. Ecchymotic flanks. Marilyn diet well, good PO intake. Up w 1 and walker.

## 2017-03-14 NOTE — PLAN OF CARE
Problem: Goal Outcome Summary  Goal: Goal Outcome Summary  PT: Goals reviewed and target date extended due to continued hospitalization. Sit to/from stand with CGA x 2 persons. Ambulates 7 feet with FWW and CGA x 2. No LE buckling noted. Sit to supine with min A x 2. Patient unable to tolerate further activity secondary to fatigue. Continue to recommend TCU on discharge.

## 2017-03-14 NOTE — PROGRESS NOTES
Phone call received this am from Taisha (phone [phone])at [facility] or [facility] regarding update on this patient. Update provided and Taisha requests notes be sent like a referral to fax [phone].

## 2017-03-14 NOTE — PROGRESS NOTES
[facility]  Hospitalist Progress Note    Ce Patrick MD  03/14/2017    Assessment & Plan      Mason Gonzalez is a 89 year old male with past medical history of atrial fibrillation on chronic anticoagulation, hypothyroidism and diet-controlled type 2 diabetes who presented from outside hospital for evaluation of an extraperitoneal hemorrhage and was admitted on 3/7/17.     Extraperitoneal hemorrhage, spontaneous bleed secondary to coagulopathy in the context of warfarin use.    The patient had a CT scan at outside facility which showed an 8.4 cm extraperitoneal hemorrhage in the right pelvis surrounding the bladder. Hemoglobin was stable at 13.6; however, INR was elevated at 5.8. S/p vitamin K at [facility].   Repeat CT abd/pelvis on admission showed increased size of extraperitoneal hematoma along the anterior aspect of the bladder which was measuring 11.6.   - received Kcentra, vitamin K and FFP upon admission  - Hgb remained stable at 10 grams.  - Seen by general surg consult service and rec is non-surg management.  Warfarin reintroduced on 3/9 but held last night.  - Pain controlled with PTA tramadol.    Acute blood loss anemia:  - secondary to above  - hgb 13 at outside hospital, down to 10.8 range and has remained stable.  - follow hemoglobin daily while in hospital    Atrial fibrillation with RVR  This is likely secondary to missed metoprolol dose as well as worsened due to acute pain. Remains in RVR despite being placed back on his PTA meds  -  INR was reversed as above, now back on warfarin.  -  INR jumped from 1.75 ---> 2.28 --> coumadin held ---> 2.62 today.  Hold coumadin again tonight.    -  Rate is better today w/ increased Metoprolol to 100 mg BID, and with the addition of digoxin.  Check dig level in 1-2 days.   -  Prn IV Metoprolol also available if HR >120    Hypothyroidism.  Continue prior to admission Synthroid.     Diet controlled DM-II:  - was not using ISS so  discontinued., BS controlled.    Congestive heart failure, unspecified. The patient has documentation of CHF in [facility]. No prior echocardiograms available.   - off fluids sicne 3/8. CXR from 3/8 showed some interstitial changes, and weight also noted up to 91 kg (87 on admission), receiving intermittent lasix, persistent wheezing and lungs very coarse.  - add scheduled duoneb    - Received lasix which is currently on hold due to worsening renal function  - follow I&O and daily weight- down to 86.8 from 90kg, incontinnent of urine thus difficult to measure I/O  - ECHO done this admission, normal LVEF.    Hypokalemia: On replacement protocol.  - normal now.  - scheduled potassium with lasix.    Suspected HCAP/atypical PNA: Persistent severe wheezing and dyspnea, has dry cough.  - Repeat CXR 3/11 showed coarse Lt lung infiltrates worse than prior.  - blood cultures x2 drawn 3/11 and started on Zosyn and azithromycin, Azithromycin d/aiyana yesterday because of it's interaction w/ coumadin.  change Zosyn to augmentin at dc.  - short course of steroid as well, dose decreased to 30 mg today given reported some delirium could be due to steroid.    Obstructive sleep apnea, does not use CPAP.     Influenza prophylaxis. The patient was initiated on Tamiflu prophylaxis at long-term care facility for 10 days total, started 3/6. Complete course.     Physical deconditioning:   - he is very weak at this time.   - PT eval noted. Plan is to dc back to LTC    Deep venous thrombosis prophylaxis: - PCDs/warfarin resumed      CODE STATUS: Full    Dispo: anticipate d/c 1-2  Days if HR remained controlled and cr improves.    Interval History      C/o abd pain.  Has non-productive cough.  No fever or chills.      -Data reviewed today: I reviewed all new labs and imaging over the last 24 hours.     Physical Exam   Heart Rate: 78, Blood pressure 114/72, pulse 91, temperature 97.4  F (36.3  C), temperature source Oral,  resp. rate 16, height 1.829 m (6'), weight 86.8 kg (191 lb 5.8 oz), SpO2 95 %.  Vitals:    03/11/17 0630 03/13/17 0329 03/14/17 0600   Weight: 90.1 kg (198 lb 10.2 oz) 86.6 kg (190 lb 14.7 oz) 86.8 kg (191 lb 5.8 oz)     Vital Signs with Ranges  Temp:  [97  F (36.1  C)-99.1  F (37.3  C)] 97.4  F (36.3  C)  Pulse:  [] 91  Heart Rate:  [] 78  Resp:  [16-24] 16  BP: ()/(56-82) 114/72  SpO2:  [90 %-99 %] 95 %  I/O's Last 24 hours  I/O last 3 completed shifts:  In: 740 [P.O.:240; I.V.:500]  Out: 660 [Urine:660]    Constitutional: Alert, awake, and no apparent distress receiving neb now  HEENT: PERRLA, EOMI, mucosa dry now  Respiratory: Coarse b/l but no wheezing now. Not in distress. On room air.  Cardiovascular: irregular, no tachycardia, trace LE edema bilaterally  Abdomen: soft, non-distended, non tenderness, ecchymosis Rt lower abd to scrotum stable  Skin/Integumen:  Ecchymosis  right lower abdomen/scrotum stable, non tender.      Medications   All medications I am responsible for were reviewed.       Warfarin Therapy Reminder         warfarin-No DOSE today  1 each Does not apply no dose today (warfarin)     digoxin  125 mcg Oral Daily     potassium chloride  20 mEq Oral Daily     predniSONE  30 mg Oral Daily     pantoprazole  40 mg Oral QAM     piperacillin-tazobactam  3.375 g Intravenous Q6H     ipratropium - albuterol 0.5 mg/2.5 mg/3 mL  3 mL Nebulization 4x daily     metoprolol  100 mg Oral BID     oseltamivir (TAMIFLU) capsule 30 mg  30 mg Oral Daily     sodium chloride (PF)  3 mL Intracatheter Q8H     levothyroxine (SYNTHROID/LEVOTHROID) tablet 200 mcg  200 mcg Oral Daily     sennosides  1 tablet Oral At Bedtime        Data     Recent Labs  Lab 03/14/17  0725 03/13/17  1148 03/12/17  0736 03/11/17  2115 03/11/17  1802 03/11/17  0735 03/10/17  0730   WBC  --   --  5.1  --  8.0  --  7.6   HGB  --  10.8* 10.9*  --  11.0* 10.5* 10.7*   MCV  --   --  101*  --  100  --  101*   PLT  --   --  300  --   310  --  232   INR 2.62* 2.28* 1.75*  --   --  1.50* 1.31*   NA  --  135 137  --   --  136 136   POTASSIUM  --  3.8 4.1 3.8  --  2.8* 3.5   CHLORIDE  --  97 100  --   --  99 100   CO2  --  29 29  --   --  27 26   BUN  --  36* 27  --   --  25 31*   CR  --  1.28* 1.19  --   --  1.01 1.08   ANIONGAP  --  9 8  --   --  10 10   SARAH  --  8.4* 8.1*  --   --  8.1* 8.2*   GLC  --  186* 218*  --   --  74 84       No results found for this or any previous visit (from the past 24 hour(s)).

## 2017-03-14 NOTE — PLAN OF CARE
"Problem: Goal Outcome Summary  Goal: Goal Outcome Summary  Outcome: Improving  Pt Alert but can be forgetful and confused, very restless. VSS, tele afib with CVR/RVR. Poor appetite. Fluids encouraged. A2 with walker, only ambulates short distances. Significant bruising dark in color surrounding right hip area, left hip bruising present, lighter in color. No evidence of current bleed. LS course, nonproductive cough present. Pain controlled with tylenol. Per pt daughter :\"pt was recently prescribed citalopram 10mg PO daily for anxiety, she is requesting that we please restart this\".       "

## 2017-03-15 LAB
ANION GAP SERPL CALCULATED.3IONS-SCNC: 10 MMOL/L (ref 3–14)
BUN SERPL-MCNC: 40 MG/DL (ref 7–30)
CALCIUM SERPL-MCNC: 8.5 MG/DL (ref 8.5–10.1)
CHLORIDE SERPL-SCNC: 101 MMOL/L (ref 94–109)
CO2 SERPL-SCNC: 25 MMOL/L (ref 20–32)
CREAT SERPL-MCNC: 1.24 MG/DL (ref 0.66–1.25)
ERYTHROCYTE [DISTWIDTH] IN BLOOD BY AUTOMATED COUNT: 16.1 % (ref 10–15)
GFR SERPL CREATININE-BSD FRML MDRD: 55 ML/MIN/1.7M2
GLUCOSE SERPL-MCNC: 164 MG/DL (ref 70–99)
HCT VFR BLD AUTO: 31.9 % (ref 40–53)
HGB BLD-MCNC: 11 G/DL (ref 13.3–17.7)
INR PPP: 2.82 (ref 0.86–1.14)
MAGNESIUM SERPL-MCNC: 2.3 MG/DL (ref 1.6–2.3)
MCH RBC QN AUTO: 34.9 PG (ref 26.5–33)
MCHC RBC AUTO-ENTMCNC: 34.5 G/DL (ref 31.5–36.5)
MCV RBC AUTO: 101 FL (ref 78–100)
PLATELET # BLD AUTO: 273 10E9/L (ref 150–450)
POTASSIUM SERPL-SCNC: 4.1 MMOL/L (ref 3.4–5.3)
RBC # BLD AUTO: 3.15 10E12/L (ref 4.4–5.9)
SODIUM SERPL-SCNC: 136 MMOL/L (ref 133–144)
WBC # BLD AUTO: 7.8 10E9/L (ref 4–11)

## 2017-03-15 PROCEDURE — 25000132 ZZH RX MED GY IP 250 OP 250 PS 637: Performed by: INTERNAL MEDICINE

## 2017-03-15 PROCEDURE — 85027 COMPLETE CBC AUTOMATED: CPT | Performed by: INTERNAL MEDICINE

## 2017-03-15 PROCEDURE — 25000128 H RX IP 250 OP 636: Performed by: INTERNAL MEDICINE

## 2017-03-15 PROCEDURE — 85610 PROTHROMBIN TIME: CPT | Performed by: INTERNAL MEDICINE

## 2017-03-15 PROCEDURE — 40000275 ZZH STATISTIC RCP TIME EA 10 MIN

## 2017-03-15 PROCEDURE — 94640 AIRWAY INHALATION TREATMENT: CPT

## 2017-03-15 PROCEDURE — 36415 COLL VENOUS BLD VENIPUNCTURE: CPT | Performed by: INTERNAL MEDICINE

## 2017-03-15 PROCEDURE — 25000132 ZZH RX MED GY IP 250 OP 250 PS 637: Performed by: HOSPITALIST

## 2017-03-15 PROCEDURE — 25000125 ZZHC RX 250: Performed by: HOSPITALIST

## 2017-03-15 PROCEDURE — 83735 ASSAY OF MAGNESIUM: CPT | Performed by: INTERNAL MEDICINE

## 2017-03-15 PROCEDURE — 25000132 ZZH RX MED GY IP 250 OP 250 PS 637: Performed by: PHYSICIAN ASSISTANT

## 2017-03-15 PROCEDURE — 94640 AIRWAY INHALATION TREATMENT: CPT | Mod: 76

## 2017-03-15 PROCEDURE — 25000128 H RX IP 250 OP 636: Performed by: HOSPITALIST

## 2017-03-15 PROCEDURE — 99232 SBSQ HOSP IP/OBS MODERATE 35: CPT | Performed by: INTERNAL MEDICINE

## 2017-03-15 PROCEDURE — 80048 BASIC METABOLIC PNL TOTAL CA: CPT | Performed by: INTERNAL MEDICINE

## 2017-03-15 PROCEDURE — 12000007 ZZH R&B INTERMEDIATE

## 2017-03-15 RX ORDER — BENZONATATE 100 MG/1
100 CAPSULE ORAL 3 TIMES DAILY PRN
Status: DISCONTINUED | OUTPATIENT
Start: 2017-03-15 | End: 2017-03-15

## 2017-03-15 RX ORDER — FUROSEMIDE 10 MG/ML
20 INJECTION INTRAMUSCULAR; INTRAVENOUS ONCE
Status: COMPLETED | OUTPATIENT
Start: 2017-03-15 | End: 2017-03-15

## 2017-03-15 RX ADMIN — PREDNISONE 30 MG: 5 TABLET ORAL at 08:26

## 2017-03-15 RX ADMIN — LIDOCAINE HYDROCHLORIDE 5 ML: 40 INJECTION, SOLUTION RETROBULBAR; TOPICAL at 20:35

## 2017-03-15 RX ADMIN — MIRTAZAPINE 15 MG: 15 TABLET, FILM COATED ORAL at 21:01

## 2017-03-15 RX ADMIN — IPRATROPIUM BROMIDE AND ALBUTEROL SULFATE 3 ML: .5; 3 SOLUTION RESPIRATORY (INHALATION) at 11:30

## 2017-03-15 RX ADMIN — Medication 10 ML: at 14:42

## 2017-03-15 RX ADMIN — Medication 25 MG: at 14:51

## 2017-03-15 RX ADMIN — METOPROLOL TARTRATE 100 MG: 100 TABLET, FILM COATED ORAL at 08:26

## 2017-03-15 RX ADMIN — PIPERACILLIN SODIUM,TAZOBACTAM SODIUM 3.38 G: 3; .375 INJECTION, POWDER, FOR SOLUTION INTRAVENOUS at 20:52

## 2017-03-15 RX ADMIN — METOPROLOL TARTRATE 100 MG: 100 TABLET, FILM COATED ORAL at 20:54

## 2017-03-15 RX ADMIN — PIPERACILLIN SODIUM,TAZOBACTAM SODIUM 3.38 G: 3; .375 INJECTION, POWDER, FOR SOLUTION INTRAVENOUS at 14:42

## 2017-03-15 RX ADMIN — IPRATROPIUM BROMIDE AND ALBUTEROL SULFATE 3 ML: .5; 3 SOLUTION RESPIRATORY (INHALATION) at 17:00

## 2017-03-15 RX ADMIN — IPRATROPIUM BROMIDE AND ALBUTEROL SULFATE 3 ML: .5; 3 SOLUTION RESPIRATORY (INHALATION) at 20:05

## 2017-03-15 RX ADMIN — FUROSEMIDE 20 MG: 10 INJECTION, SOLUTION INTRAVENOUS at 17:36

## 2017-03-15 RX ADMIN — OSELTAMIVIR PHOSPHATE 30 MG: 30 CAPSULE ORAL at 08:26

## 2017-03-15 RX ADMIN — DIGOXIN 125 MCG: 125 TABLET ORAL at 08:26

## 2017-03-15 RX ADMIN — BENZONATATE 100 MG: 100 CAPSULE ORAL at 05:06

## 2017-03-15 RX ADMIN — POTASSIUM CHLORIDE 20 MEQ: 1500 TABLET, EXTENDED RELEASE ORAL at 08:27

## 2017-03-15 RX ADMIN — IPRATROPIUM BROMIDE AND ALBUTEROL SULFATE 3 ML: .5; 3 SOLUTION RESPIRATORY (INHALATION) at 08:23

## 2017-03-15 RX ADMIN — ALBUTEROL SULFATE 2.5 MG: 2.5 SOLUTION RESPIRATORY (INHALATION) at 02:05

## 2017-03-15 RX ADMIN — SENNOSIDES 1 TABLET: 8.6 TABLET, FILM COATED ORAL at 21:01

## 2017-03-15 RX ADMIN — PIPERACILLIN SODIUM,TAZOBACTAM SODIUM 3.38 G: 3; .375 INJECTION, POWDER, FOR SOLUTION INTRAVENOUS at 01:40

## 2017-03-15 RX ADMIN — Medication 25 MG: at 05:09

## 2017-03-15 RX ADMIN — PIPERACILLIN SODIUM,TAZOBACTAM SODIUM 3.38 G: 3; .375 INJECTION, POWDER, FOR SOLUTION INTRAVENOUS at 08:20

## 2017-03-15 RX ADMIN — Medication 4 MG: at 22:50

## 2017-03-15 RX ADMIN — Medication 25 MG: at 22:50

## 2017-03-15 RX ADMIN — PANTOPRAZOLE SODIUM 40 MG: 40 TABLET, DELAYED RELEASE ORAL at 08:26

## 2017-03-15 NOTE — PLAN OF CARE
Problem: Goal Outcome Summary  Goal: Goal Outcome Summary  Outcome: No Change  Disoriented to time and situation, restless and anxious, unable to redirect, gave prn seroquel. Non-productive dry cough, gave prn robitussin. Generalized discomfort with relieved with prn tylenol. Up with one assist & gait belt. Denies N/V. Daughter visited in the evening, supportive. Will monitor.

## 2017-03-15 NOTE — PLAN OF CARE
Problem: Goal Outcome Summary  Goal: Goal Outcome Summary  Outcome: No Change  Orientated to self. Restless, long arm sit appropriate. Denies pain. LS dim with crackels, DIAMOND: prn neb given. Frequent non productive dry cough. Up with 1-2 to WC.

## 2017-03-15 NOTE — PROGRESS NOTES
SPIRITUAL HEALTH SERVICES  Spiritual Assessment Progress Note  [facility]   attempted multiple times to visit with pt due to LOS.   Pt has either been busy with other staff or out of his room.      SH will try again.                                                                                                                                      Naima Webster M.Div., [facility]  Staff    Pager [phone]

## 2017-03-15 NOTE — PLAN OF CARE
Problem: Goal Outcome Summary  Goal: Goal Outcome Summary  Outcome: Improving  VSS, A&O, a little agitated, tired. Nonblanchable erythema on coccyx with 2 open areas, covered c mepilex, WOCN consult placed. Modcarb diet, iván well, enjoys protein shakes warmed up. SBA c walker. LS coarse. Voiding. IV SL.

## 2017-03-15 NOTE — PLAN OF CARE
Problem: Goal Outcome Summary  Goal: Goal Outcome Summary  PT: Cancel- pt refusing PT at attempt this am despite max encouragement and education on benefits of progressive activity and exercise. Pt becoming irritable with encouragement attempts or offer for various exercise/activity options.

## 2017-03-15 NOTE — PROGRESS NOTES
Respiratory status is stable on RA. BBS diminished/exp wheezes. Neb tx given as ordered. Will continue to follow.     3/14/2017  Flako Stevens, RRT

## 2017-03-15 NOTE — PROGRESS NOTES
[facility]  Hospitalist Progress Note    Ce Patrick MD  03/15/2017    Assessment & Plan      Mason Gonzalez is a 89 year old male with past medical history of atrial fibrillation on chronic anticoagulation, hypothyroidism and diet-controlled type 2 diabetes who presented from outside hospital for evaluation of an extraperitoneal hemorrhage and was admitted on 3/7/17.     Extraperitoneal hemorrhage, spontaneous bleed secondary to coagulopathy in the context of warfarin use.    The patient had a CT scan at outside facility which showed an 8.4 cm extraperitoneal hemorrhage in the right pelvis surrounding the bladder. Hemoglobin was stable at 13.6; however, INR was elevated at 5.8. S/p vitamin K at [facility].   Repeat CT abd/pelvis on admission showed increased size of extraperitoneal hematoma along the anterior aspect of the bladder which was measuring 11.6 cm.   - received Kcentra, vitamin K and FFP upon admission  - Hgb remained stable at 11 grams.  - Seen by general surg consult and rec is non-surg management.  Warfarin reintroduced on 3/9 but has been on hold since 3/13.  - Pain controlled with PTA tramadol.    Acute blood loss anemia:  - secondary to above  - hgb 13 at outside hospital, down to 11grams and has remained stable.  - follow hemoglobin daily while in hospital    Atrial fibrillation with RVR  This is likely secondary to missed metoprolol dose as well as worsened due to acute pain. Remains in RVR despite being placed back on his PTA meds  -  INR was reversed as above, now back on warfarin.  -  INR jumped from 1.75 ---> 2.28 --> coumadin held ---> 2.62 today.  Hold coumadin again tonight.    -  Rate is better today w/ increased Metoprolol to 100 mg BID, and with the addition of digoxin.  Check dig level in 1-2 days.   -  Prn IV Metoprolol also available if HR >120    Hypothyroidism.  Continue prior to admission Synthroid.     Diet controlled DM-II:  - was not using ISS so  discontinued., BS controlled.    Congestive heart failure, unspecified. The patient has documentation of CHF in [facility] past medical history from [facility]. No prior echocardiograms available.   - off fluids sicne 3/8. CXR from 3/8 showed some interstitial changes, and weight also noted up to 91 kg (87 on admission), receiving intermittent lasix, persistent wheezing and lungs very coarse.  - add scheduled duoneb    - Received lasix which is currently on hold due to worsening renal function  - follow I&O and daily weight. incontinnent of urine thus difficult to measure I/O  - ECHO done this admission, normal LVEF.    ARF:  Likely pre-renal.  Baseline cr unknown.  Admit cr was 1.2 ---> ---> 1.28 ---> 1.24. Avoid Nephrotoxins.  Lasix d/aiyana.    Hypokalemia: On replacement protocol.  - normal now.    Suspected HCAP/atypical PNA: Persistent severe wheezing, dyspnea and dry cough.  - Repeat CXR 3/11 showed coarse Lt lung infiltrates worse than prior.  - blood cultures x2 drawn 3/11 and started on Zosyn and azithromycin, Azithromycin d/aiyana yesterday because of it's interaction w/ coumadin.  change Zosyn to augmentin at [facility].  - short course of steroid as well, dose decreased to 30 mg today given reported some delirium could be due to steroid  - Add acapella valve, Tessalon and Lidocaine neb x one.    Obstructive sleep apnea, does not use CPAP.     Influenza prophylaxis. The patient was initiated on Tamiflu prophylaxis at long-term care facility for 10 days total, started 3/6. Complete course.     Physical deconditioning:   - he is very weak at this time.   - PT eval noted.  Plan is to dc back to LTC    Deep venous thrombosis prophylaxis: - PCDs/warfarin      CODE STATUS: Full    Dispo: anticipate d/c in am if HR remained controlled, cr improves and cough is better..    Interval History      C/o non-productive cough.  No fever or chills.      -Data reviewed today: I reviewed all new labs and imaging over the last 24 hours.     Physical  Exam   Heart Rate: 100, Blood pressure 121/71, pulse 101, temperature 97.1  F (36.2  C), temperature source Oral, resp. rate 18, height 1.829 m (6'), weight 92.8 kg (204 lb 9.4 oz), SpO2 94 %.  Vitals:    03/13/17 0329 03/14/17 0600 03/15/17 0455   Weight: 86.6 kg (190 lb 14.7 oz) 86.8 kg (191 lb 5.8 oz) 92.8 kg (204 lb 9.4 oz)     Vital Signs with Ranges  Temp:  [97.1  F (36.2  C)-97.8  F (36.6  C)] 97.1  F (36.2  C)  Pulse:  [101] 101  Heart Rate:  [] 100  Resp:  [16-18] 18  BP: (112-124)/(68-80) 121/71  SpO2:  [87 %-98 %] 94 %  I/O's Last 24 hours  I/O last 3 completed shifts:  In: 1457 [P.O.:960; I.V.:497]  Out: 725 [Urine:725]    Constitutional: Alert, awake, and no apparent distress receiving neb now  HEENT: PERRLA, EOMI, mucosa dry now  Respiratory: Coarse b/l but no wheezing now. Not in distress. On room air.  Cardiovascular: irregular, no tachycardia, trace LE edema bilaterally  Abdomen: soft, non-distended, non tenderness, ecchymosis Rt lower abd to scrotum stable  Skin/Integumen:  Ecchymosis  right lower abdomen/scrotum stable, non tender.      Medications   All medications I am responsible for were reviewed.       Warfarin Therapy Reminder         warfarin-No DOSE today  1 each Does not apply no dose today (warfarin)     lidocaine inhalant  3 mL Nebulization Once     mirtazapine  15 mg Oral At Bedtime     digoxin  125 mcg Oral Daily     potassium chloride  20 mEq Oral Daily     pantoprazole  40 mg Oral QAM     piperacillin-tazobactam  3.375 g Intravenous Q6H     ipratropium - albuterol 0.5 mg/2.5 mg/3 mL  3 mL Nebulization 4x daily     metoprolol  100 mg Oral BID     sodium chloride (PF)  3 mL Intracatheter Q8H     levothyroxine (SYNTHROID/LEVOTHROID) tablet 200 mcg  200 mcg Oral Daily     sennosides  1 tablet Oral At Bedtime        Data     Recent Labs  Lab 03/15/17  0715 03/14/17  0725 03/13/17  1148 03/12/17  0736  03/11/17  1801   WBC 7.8  --   --  5.1  --  8.0   HGB 11.0*  --  10.8* 10.9*  --   11.0*   *  --   --  101*  --  100     --   --  300  --  310   INR 2.82* 2.62* 2.28* 1.75*  --   --      --  135 137  --   --    POTASSIUM 4.1  --  3.8 4.1  < >  --    CHLORIDE 101  --  97 100  --   --    CO2 25  --  29 29  --   --    BUN 40*  --  36* 27  --   --    CR 1.24  --  1.28* 1.19  --   --    ANIONGAP 10  --  9 8  --   --    SARAH 8.5  --  8.4* 8.1*  --   --    *  --  186* 218*  --   --    < > = values in this interval not displayed.    No results found for this or any previous visit (from the past 24 hour(s)).

## 2017-03-16 ENCOUNTER — APPOINTMENT (OUTPATIENT)
Dept: CT IMAGING | Facility: CLINIC | Age: 82
DRG: 813 | End: 2017-03-16
Attending: INTERNAL MEDICINE
Payer: COMMERCIAL

## 2017-03-16 LAB
INR PPP: 2.84 (ref 0.86–1.14)
LACTATE BLD-SCNC: 2.3 MMOL/L (ref 0.7–2.1)

## 2017-03-16 PROCEDURE — 25000125 ZZHC RX 250: Performed by: INTERNAL MEDICINE

## 2017-03-16 PROCEDURE — 25000132 ZZH RX MED GY IP 250 OP 250 PS 637: Performed by: INTERNAL MEDICINE

## 2017-03-16 PROCEDURE — 71250 CT THORAX DX C-: CPT

## 2017-03-16 PROCEDURE — 25000132 ZZH RX MED GY IP 250 OP 250 PS 637: Performed by: HOSPITALIST

## 2017-03-16 PROCEDURE — 36415 COLL VENOUS BLD VENIPUNCTURE: CPT | Performed by: INTERNAL MEDICINE

## 2017-03-16 PROCEDURE — 99211 OFF/OP EST MAY X REQ PHY/QHP: CPT

## 2017-03-16 PROCEDURE — 94640 AIRWAY INHALATION TREATMENT: CPT

## 2017-03-16 PROCEDURE — 85610 PROTHROMBIN TIME: CPT | Performed by: INTERNAL MEDICINE

## 2017-03-16 PROCEDURE — 25000132 ZZH RX MED GY IP 250 OP 250 PS 637: Performed by: PHYSICIAN ASSISTANT

## 2017-03-16 PROCEDURE — 99233 SBSQ HOSP IP/OBS HIGH 50: CPT | Performed by: INTERNAL MEDICINE

## 2017-03-16 PROCEDURE — 25000125 ZZHC RX 250: Performed by: HOSPITALIST

## 2017-03-16 PROCEDURE — 12000007 ZZH R&B INTERMEDIATE

## 2017-03-16 PROCEDURE — 25000128 H RX IP 250 OP 636: Performed by: HOSPITALIST

## 2017-03-16 PROCEDURE — 40000275 ZZH STATISTIC RCP TIME EA 10 MIN

## 2017-03-16 PROCEDURE — 83605 ASSAY OF LACTIC ACID: CPT | Performed by: INTERNAL MEDICINE

## 2017-03-16 PROCEDURE — 94640 AIRWAY INHALATION TREATMENT: CPT | Mod: 76

## 2017-03-16 RX ORDER — PIPERACILLIN SODIUM, TAZOBACTAM SODIUM 4; .5 G/20ML; G/20ML
4.5 INJECTION, POWDER, LYOPHILIZED, FOR SOLUTION INTRAVENOUS EVERY 6 HOURS
Status: DISCONTINUED | OUTPATIENT
Start: 2017-03-16 | End: 2017-03-19 | Stop reason: HOSPADM

## 2017-03-16 RX ORDER — ACETYLCYSTEINE 200 MG/ML
2 SOLUTION ORAL; RESPIRATORY (INHALATION) EVERY 4 HOURS
Status: DISCONTINUED | OUTPATIENT
Start: 2017-03-16 | End: 2017-03-17

## 2017-03-16 RX ORDER — BENZONATATE 100 MG/1
100 CAPSULE ORAL 3 TIMES DAILY
Status: DISCONTINUED | OUTPATIENT
Start: 2017-03-16 | End: 2017-03-17

## 2017-03-16 RX ADMIN — IPRATROPIUM BROMIDE AND ALBUTEROL SULFATE 3 ML: .5; 3 SOLUTION RESPIRATORY (INHALATION) at 11:18

## 2017-03-16 RX ADMIN — GUAIFENESIN 10 ML: 100 SOLUTION ORAL at 15:06

## 2017-03-16 RX ADMIN — ALBUTEROL SULFATE 2.5 MG: 2.5 SOLUTION RESPIRATORY (INHALATION) at 03:31

## 2017-03-16 RX ADMIN — ACETYLCYSTEINE 2 ML: 200 INHALANT RESPIRATORY (INHALATION) at 18:55

## 2017-03-16 RX ADMIN — PIPERACILLIN AND TAZOBACTAM 4.5 G: 4; .5 INJECTION, POWDER, FOR SOLUTION INTRAVENOUS at 17:50

## 2017-03-16 RX ADMIN — IPRATROPIUM BROMIDE AND ALBUTEROL SULFATE 3 ML: .5; 3 SOLUTION RESPIRATORY (INHALATION) at 15:43

## 2017-03-16 RX ADMIN — GUAIFENESIN 10 ML: 100 SOLUTION ORAL at 01:15

## 2017-03-16 RX ADMIN — IPRATROPIUM BROMIDE AND ALBUTEROL SULFATE 3 ML: .5; 3 SOLUTION RESPIRATORY (INHALATION) at 18:55

## 2017-03-16 RX ADMIN — IPRATROPIUM BROMIDE AND ALBUTEROL SULFATE 3 ML: .5; 3 SOLUTION RESPIRATORY (INHALATION) at 07:27

## 2017-03-16 RX ADMIN — ACETYLCYSTEINE 2 ML: 200 INHALANT RESPIRATORY (INHALATION) at 15:43

## 2017-03-16 RX ADMIN — BENZONATATE 100 MG: 100 CAPSULE ORAL at 15:06

## 2017-03-16 RX ADMIN — PIPERACILLIN AND TAZOBACTAM 4.5 G: 4; .5 INJECTION, POWDER, FOR SOLUTION INTRAVENOUS at 22:40

## 2017-03-16 RX ADMIN — ACETAMINOPHEN 650 MG: 325 TABLET, FILM COATED ORAL at 21:18

## 2017-03-16 RX ADMIN — BENZONATATE 100 MG: 100 CAPSULE ORAL at 22:41

## 2017-03-16 RX ADMIN — PIPERACILLIN SODIUM,TAZOBACTAM SODIUM 3.38 G: 3; .375 INJECTION, POWDER, FOR SOLUTION INTRAVENOUS at 01:15

## 2017-03-16 RX ADMIN — Medication 25 MG: at 05:36

## 2017-03-16 RX ADMIN — ACETAMINOPHEN 650 MG: 325 TABLET, FILM COATED ORAL at 05:36

## 2017-03-16 RX ADMIN — METOPROLOL TARTRATE 100 MG: 100 TABLET, FILM COATED ORAL at 22:40

## 2017-03-16 RX ADMIN — SENNOSIDES 1 TABLET: 8.6 TABLET, FILM COATED ORAL at 22:41

## 2017-03-16 RX ADMIN — MIRTAZAPINE 15 MG: 15 TABLET, FILM COATED ORAL at 22:41

## 2017-03-16 RX ADMIN — METOPROLOL TARTRATE 100 MG: 100 TABLET, FILM COATED ORAL at 10:33

## 2017-03-16 RX ADMIN — DIGOXIN 125 MCG: 125 TABLET ORAL at 10:33

## 2017-03-16 RX ADMIN — PANTOPRAZOLE SODIUM 40 MG: 40 TABLET, DELAYED RELEASE ORAL at 10:33

## 2017-03-16 RX ADMIN — PIPERACILLIN SODIUM,TAZOBACTAM SODIUM 3.38 G: 3; .375 INJECTION, POWDER, FOR SOLUTION INTRAVENOUS at 10:34

## 2017-03-16 ASSESSMENT — PAIN DESCRIPTION - DESCRIPTORS: DESCRIPTORS: HEADACHE

## 2017-03-16 NOTE — PLAN OF CARE
Problem: Goal Outcome Summary  Goal: Goal Outcome Summary  Outcome: No Change  A/O, VSS, SBA with walker, tele afib CVR, 1L O2, BS, flatus, frequent dry coughs, LS clear, BLE numbness baseline, mepilex on coccyx in place, seroquel and melatonin given prn.

## 2017-03-16 NOTE — PROVIDER NOTIFICATION
Paged hospitalist to inform them of patient lactic acid level 2.3 . Also, informed him previous lacid acid was 2.5 and already on Zosyn for antibiotics.

## 2017-03-16 NOTE — PROGRESS NOTES
Pt on 3 lpm NC, SpO2 97%, BBS diminished, all the neb tx were given as ordered, will continue to monitor the pt.     Andrzej Beltran RT.

## 2017-03-16 NOTE — PLAN OF CARE
"Problem: Goal Outcome Summary  Goal: Goal Outcome Summary  PT-Attempted to see. Patient refused even with max encouragement from PT and nurse. Patients became slightly agitated \"I am not walking.\"       "

## 2017-03-16 NOTE — PROGRESS NOTES
[facility] Nurse Inpatient Adult Pressure Injury (PI) Assessment     Follow-up Assessment of PI(s) on pt's: Rt coccyx/fleshy  buttock area - community acquired    Data:   Patient History:     Mason Gonzalez is a 89 year old male with past medical history of atrial fibrillation on chronic anticoagulation, hypothyroidism and diet-controlled type 2 diabetes who presented from outside hospital for evaluation of an extraperitoneal hemorrhage.         Current mattress:  AtmosAir  Current pressure relieving devices:  Pillows    Moisture Management:  Incontinence Protocol      Current Diet / Nutrition:     Active Diet Order      Combination Diet 7288-4591 Calories: Moderate Consistent CHO (4-6 CHO units/meal); 2 gm NA Diet      Puma Assessment and sub scores:   Puma Score  Av.4  Min: 14  Max: 18     Labs:   Recent Labs   Lab Test  17   0744  03/15/17   0715   17   0600   HGB   --   11.0*   < >  11.0*   INR  2.84*  2.82*   < >  1.17*   WBC   --   7.8   < >  7.7   A1C   --    --    --   5.7    < > = values in this interval not displayed.                                                                                                                        Pressure Injury Assessment  (location #1): Rt coccyx/inner fleshy buttocks  Wound Base: 2.0cm x 2.0cm x scattered scabbed area with blanchable erythema.  Scabs whitish, superficial and flaking now.      Tunneling:  N/A    Undermining: N/A    Palpation of the wound bed:  normal    Slough appearance:  none    Eschar appearance:  none    Periwound Skin: intact    Temperature  normal     Drainage:   Amount: none     Odor: none    Pain:  minimal          Intervention:     Patient's chart evaluated.      Puma Interventions:  Current Puma Interventions and Care Plan reviewed and updated, appropriate at this time.    Wound was assessed.    Wound Care: was done: replaced Mepilex    Orders  In Epic    Supplies  In floor supply  "room    Discussed plan of care with Nursing           Assessment:    Small area of unstageable PI, likely Stage 2, with improved surrounding blanchable erythema.  Community acquired.  Nearly resolved. No local s/s infection         Plan:     Nursing to notify the Provider(s) and re-consult the WOC Nurse if wound(s) deteriorate(s)or if the wound care plan needs reevaluation.    If pt is refusing to turn or reposition they must be educated on the  potential injury from not off loading pressure.  Then this \"educated refusal\" needs to be documented as an \"educated refusal to turn/ reposition\" and document if alert, etc.    Rt coccyx/fleshy  buttock area PI:     -change dressing on odd days and prn     -Mepilex border    WOC Nurse will return: weekly and prn  Face to face time: 15 minutes    "

## 2017-03-16 NOTE — PROGRESS NOTES
Care Transition Initial Assessment - BASSAM  Reason For Consult: discharge planning  Met with: Patient    Active Problems:    Intraabdominal hemorrhage         DATA  Lives With: facility resident  Living Arrangements: extended care facility  Description of Support System: Supportive  Who is your support system?: Children  Support Assessment: Adequate social supports, Adequate family and caregiver support.   Identified issues/concerns regarding health management: Res[facility].  Patient feels that they have adequate support @ home?  Yes  Quality Of Family Relationships: supportive         ASSESSMENT  Cognitive Status:  awake and alert      BASSAM has reviewed pt records. Pt is Mason, a 89yo gentleman who was admitted on 03/07/2017 due to intraabdominal hemorrhage. Pt resides in long-term care at [facility]. Pt has a bed hold and will return at discharge. Pt did require a one to one due to fear of being along, did not display behaviors or impulsivity. BASSAM has faxed clinical updates to [phone]. BASSAM will continue to update facility and coordinate return when appropriate. If pt continues to require oxygen, anticipate stretcher transport will be need as pt unable to safely monitor this independently.     PLAN  Financial costs for the patient includes: unknown at this time.  Patient given options and choices for discharge N/A, returning to LTC.  Patient/family is agreeable to the plan?  Yes  Patient Goals and Preferences: LTC.    MIRZA Bah, [facility]  Daytime (8:00am-4:30pm): [phone]  After-Hours  Pager (4:30pm-11:30pm): [phone]

## 2017-03-16 NOTE — PROGRESS NOTES
[facility]  Hospitalist Progress Note    Ce Patrick MD  03/16/2017    Assessment & Plan      Mason Gonzalez is a 89 year old male with past medical history of atrial fibrillation on chronic anticoagulation, hypothyroidism and diet-controlled type 2 diabetes who presented from outside hospital for evaluation of an extraperitoneal hemorrhage and was admitted on 3/7/17.     Extraperitoneal hemorrhage, spontaneous bleed secondary to coagulopathy in the context of warfarin use.    The patient had a CT scan at outside facility which showed an 8.4 cm extraperitoneal hemorrhage in the right pelvis surrounding the bladder. Hemoglobin was stable at 13.6; however, INR was elevated at 5.8. S/p vitamin K at [facility].   Repeat CT abd/pelvis on admission showed increased size of extraperitoneal hematoma along the anterior aspect of the bladder which was measuring 11.6 cm.   - received Kcentra, vitamin K and FFP upon admission  - Hgb remained stable at 11 grams.  - Seen by general surg consult and rec is non-surg management.  Warfarin reintroduced on 3/9 but has been on hold since 3/13.  - Pain controlled with PTA tramadol.    Acute blood loss anemia:  - secondary to above  - hgb 13 at outside hospital, down to 11grams and has remained stable.  - check CBC in am    Atrial fibrillation with RVR  This is likely secondary to missed metoprolol dose as well as worsened due to acute pain.  Remains in RVR despite being placed back on his PTA meds  -  INR was reversed as above, now back on warfarin.  -  INR jumped from 1.75 ---> 2.28 --> coumadin held ---> 2.62 ---> 2.84 today.  Coumadin has been on hold past 4 days.    -  HR is better today w/ increased dose of Metoprolol 100 mg BID, and with the addition of digoxin.  Check LFTs and dig levels tomorrow.   -  Prn IV Metoprolol also available if HR >120    Hypothyroidism.  Continue prior to admission Synthroid.     Diet controlled DM-II:  - was not using ISS so  discontinued., BS controlled.    Congestive heart failure, unspecified. The patient has [facility] No prior echocardiograms available.   - off fluids sicne 3/8. CXR from 3/8 showed some interstitial changes, and weight also noted up to 91 kg (87 on admission), receiving intermittent lasix, persistent wheezing and lungs very coarse.  - add scheduled duoneb    - Received lasix which is currently on hold due to worsening renal function  - follow I&O and daily weight. incontinnent of urine thus difficult to measure I/O  - ECHO done this admission, normal LVEF.    ARF:  Likely pre-renal.  Baseline cr unknown.  Admit cr was 1.2 ---> ---> 1.28 ---> 1.24. Avoid Nephrotoxins.  Lasix d/aiyana.    Hypokalemia: On replacement protocol.  - normal now.    Suspected HCAP/atypical PNA: Persistent severe wheezing, dyspnea and dry cough.  - Repeat CXR 3/11 showed coarse Lt lung infiltrates worse than prior.  - blood cultures x2 drawn 3/11 and started on Zosyn and azithromycin, Azithromycin d/aiyana on 3/13 because of it's interaction w/ coumadin.  Will change Zosyn to augmentin at [place].  - Received short course of steroid.  - Now with persistent non-productive cough.  Not on ACE I.  - will obtain CT chest w/o contrast .  - continue acapella valve treatment.  schedule Tessalon tid.  Lidocaine neb x one again today. Add mucomyst neb.  - On protonix already.    Obstructive sleep apnea:   Does not use CPAP.     Influenza prophylaxis. The patient was initiated on Tamiflu prophylaxis at long-term care facility for 10 days total, started 3/6. Complete course.     Physical deconditioning:   - he is very weak at this time.   - PT eval noted.  Plan is to dc back to LTC    Deep venous thrombosis prophylaxis: - PCDs/warfarin      CODE STATUS: Full    Dispo:   Anticipate d/c in 1-2 days if cough is better.    Interval History      C/o intractable non-productive cough.  No fever or chills.      -Data reviewed  today: I reviewed all new labs and imaging over the last 24 hours.     Physical Exam   Heart Rate: 99, Blood pressure 109/69, pulse 88, temperature 97.2  F (36.2  C), temperature source Axillary, resp. rate 18, height 1.829 m (6'), weight 88.5 kg (195 lb 1.7 oz), SpO2 91 %.  Vitals:    03/14/17 0600 03/15/17 0455 03/16/17 0500   Weight: 86.8 kg (191 lb 5.8 oz) 92.8 kg (204 lb 9.4 oz) 88.5 kg (195 lb 1.7 oz)     Vital Signs with Ranges  Temp:  [97.2  F (36.2  C)-97.8  F (36.6  C)] 97.2  F (36.2  C)  Pulse:  [] 88  Heart Rate:  [87-99] 99  Resp:  [18-34] 18  BP: (108-143)/(65-85) 109/69  SpO2:  [91 %-100 %] 91 %  I/O's Last 24 hours  I/O last 3 completed shifts:  In: 1140 [P.O.:840; I.V.:300]  Out: 1025 [Urine:1025]    Constitutional: Alert, awake, and no apparent distress receiving neb now  HEENT: PERRLA, EOMI, mucosa dry now  Respiratory: Coarse b/l but no wheezing now. Not in distress. On room air.  Cardiovascular: irregular, no tachycardia, trace LE edema bilaterally  Abdomen: soft, non-distended, non tenderness, ecchymosis Rt lower abd to scrotum stable  Skin/Integumen:  Ecchymosis  right lower abdomen/scrotum stable, non tender.      Medications   All medications I am responsible for were reviewed.       Warfarin Therapy Reminder         piperacillin-tazobactam  4.5 g Intravenous Q6H     warfarin-No DOSE today  1 each Does not apply no dose today (warfarin)     lidocaine inhalant  3 mL Nebulization Once     benzonatate  100 mg Oral TID     acetylcysteine  2 mL Nebulization Q4H     mirtazapine  15 mg Oral At Bedtime     digoxin  125 mcg Oral Daily     pantoprazole  40 mg Oral QAM     ipratropium - albuterol 0.5 mg/2.5 mg/3 mL  3 mL Nebulization 4x daily     metoprolol  100 mg Oral BID     sodium chloride (PF)  3 mL Intracatheter Q8H     levothyroxine (SYNTHROID/LEVOTHROID) tablet 200 mcg  200 mcg Oral Daily     sennosides  1 tablet Oral At Bedtime        Data     Recent Labs  Lab 03/16/17  0729  03/15/17  0715 03/14/17  0725 03/13/17  1148 03/12/17  0736  03/11/17  1802   WBC  --  7.8  --   --  5.1  --  8.0   HGB  --  11.0*  --  10.8* 10.9*  --  11.0*   MCV  --  101*  --   --  101*  --  100   PLT  --  273  --   --  300  --  310   INR 2.84* 2.82* 2.62* 2.28* 1.75*  --   --    NA  --  136  --  135 137  --   --    POTASSIUM  --  4.1  --  3.8 4.1  < >  --    CHLORIDE  --  101  --  97 100  --   --    CO2  --  25  --  29 29  --   --    BUN  --  40*  --  36* 27  --   --    CR  --  1.24  --  1.28* 1.19  --   --    ANIONGAP  --  10  --  9 8  --   --    SARAH  --  8.5  --  8.4* 8.1*  --   --    GLC  --  164*  --  186* 218*  --   --    < > = values in this interval not displayed.    No results found for this or any previous visit (from the past 24 hour(s)).

## 2017-03-16 NOTE — PLAN OF CARE
Problem: Goal Outcome Summary  Goal: Goal Outcome Summary  Outcome: No Change  Patient A/D to time, Patient tachycardic increased RR 3L NC other VSS. SBA with walker, tele afib RBR, BS WDL. Patient restless and easily agitated  Frequent dry coughs, LS clear, mepilex on coccyx in place. Patient denies pain, SOB. lactic acid 2.3 notified hospitalist no intervention ordered. Will continue monitoring.

## 2017-03-16 NOTE — PROGRESS NOTES
[facility]  Hospitalist Progress Note    Ce Patrick MD  03/16/2017    Assessment & Plan      Mason Gonzalez is a 89 year old male with past medical history of atrial fibrillation on chronic anticoagulation, hypothyroidism and diet-controlled type 2 diabetes who presented from outside hospital for evaluation of an extraperitoneal hemorrhage and was admitted on 3/7/17.     Extraperitoneal hemorrhage, spontaneous bleed secondary to coagulopathy in the context of warfarin use.    The patient had a CT scan at outside facility which showed an 8.4 cm extraperitoneal hemorrhage in the right pelvis surrounding the bladder. Hemoglobin was stable at 13.6; however, INR was elevated at 5.8. S/p vitamin K at [facility].   Repeat CT abd/pelvis on admission showed increased size of extraperitoneal hematoma along the anterior aspect of the bladder which was measuring 11.6 cm.   - received Kcentra, vitamin K and FFP upon admission  - Hgb remained stable at 11 grams.  - Seen by general surg consult and rec is non-surg management.  Warfarin reintroduced on 3/9 but has been on hold since 3/13.  - Pain controlled with PTA tramadol.    Acute blood loss anemia:  - secondary to above  - hgb 13 at outside hospital, down to 11grams and has remained stable.  - check CBC in am    Atrial fibrillation with RVR  This is likely secondary to missed metoprolol dose as well as worsened due to acute pain.  Remains in RVR despite being placed back on his PTA meds  -  INR was reversed as above, now back on warfarin.  -  INR jumped from 1.75 ---> 2.28 --> coumadin held ---> 2.62 ---> 2.84 today.  Coumadin has been on hold past 4 days.    -  HR is better today w/ increased dose of Metoprolol 100 mg BID, and with the addition of digoxin.  Check LFTs and dig levels tomorrow.   -  Prn IV Metoprolol also available if HR >120    Hypothyroidism.  Continue prior to admission Synthroid.     Diet controlled DM-II:  - was not using ISS so  discontinued., BS controlled.    Congestive heart failure, unspecified. The patient has [facility]. No prior echocardiograms available.   - off fluids sicne 3/8. CXR from 3/8 showed some interstitial changes, and weight also noted up to 91 kg (87 on admission), receiving intermittent lasix, persistent wheezing and lungs very coarse.  - add scheduled duoneb    - Received lasix which is currently on hold due to worsening renal function  - follow I&O and daily weight. incontinnent of urine thus difficult to measure I/O  - ECHO done this admission, normal LVEF.    ARF:  Likely pre-renal.  Baseline cr unknown.  Admit cr was 1.2 ---> ---> 1.28 ---> 1.24. Avoid Nephrotoxins.  Lasix d/aiyana.    Hypokalemia: On replacement protocol.  - normal now.    Suspected HCAP/atypical PNA: Persistent severe wheezing, dyspnea and dry cough.  - Repeat CXR 3/11 showed coarse Lt lung infiltrates worse than prior.  - blood cultures x2 drawn 3/11 and started on Zosyn and azithromycin, Azithromycin d/aiyana on 3/13 because of it's interaction w/ coumadin.  Will change Zosyn to augmentin at [place].  - Received short course of steroid.  - Now with persistent non-productive cough.  Not on ACE I.  - will obtain CT chest w/o contrast .  - continue acapella valve treatment.  schedule Tessalon tid.  Lidocaine neb x one again today. Add mucomyst neb.    Obstructive sleep apnea:   Does not use CPAP.     Influenza prophylaxis. The patient was initiated on Tamiflu prophylaxis at long-term care facility for 10 days total, started 3/6. Complete course.     Physical deconditioning:   - he is very weak at this time.   - PT eval noted.  Plan is to dc back to LTC    Deep venous thrombosis prophylaxis: - PCDs/warfarin      CODE STATUS: Full    Dispo:   Anticipate d/c in 1-2 days if cough is better.    Interval History      C/o intractable non-productive cough.  No fever or chills.      -Data reviewed today: I reviewed all  new labs and imaging over the last 24 hours.     Physical Exam   Heart Rate: 99, Blood pressure 109/69, pulse 88, temperature 97.2  F (36.2  C), temperature source Axillary, resp. rate 18, height 1.829 m (6'), weight 88.5 kg (195 lb 1.7 oz), SpO2 91 %.  Vitals:    03/14/17 0600 03/15/17 0455 03/16/17 0500   Weight: 86.8 kg (191 lb 5.8 oz) 92.8 kg (204 lb 9.4 oz) 88.5 kg (195 lb 1.7 oz)     Vital Signs with Ranges  Temp:  [97.2  F (36.2  C)-97.8  F (36.6  C)] 97.2  F (36.2  C)  Pulse:  [] 88  Heart Rate:  [87-99] 99  Resp:  [18-34] 18  BP: (108-143)/(65-85) 109/69  SpO2:  [91 %-100 %] 91 %  I/O's Last 24 hours  I/O last 3 completed shifts:  In: 1140 [P.O.:840; I.V.:300]  Out: 1025 [Urine:1025]    Constitutional: Alert, awake, and no apparent distress receiving neb now  HEENT: PERRLA, EOMI, mucosa dry now  Respiratory: Coarse b/l but no wheezing now. Not in distress. On room air.  Cardiovascular: irregular, no tachycardia, trace LE edema bilaterally  Abdomen: soft, non-distended, non tenderness, ecchymosis Rt lower abd to scrotum stable  Skin/Integumen:  Ecchymosis  right lower abdomen/scrotum stable, non tender.      Medications   All medications I am responsible for were reviewed.       Warfarin Therapy Reminder         piperacillin-tazobactam  4.5 g Intravenous Q6H     warfarin-No DOSE today  1 each Does not apply no dose today (warfarin)     mirtazapine  15 mg Oral At Bedtime     digoxin  125 mcg Oral Daily     pantoprazole  40 mg Oral QAM     ipratropium - albuterol 0.5 mg/2.5 mg/3 mL  3 mL Nebulization 4x daily     metoprolol  100 mg Oral BID     sodium chloride (PF)  3 mL Intracatheter Q8H     levothyroxine (SYNTHROID/LEVOTHROID) tablet 200 mcg  200 mcg Oral Daily     sennosides  1 tablet Oral At Bedtime        Data     Recent Labs  Lab 03/16/17  0744 03/15/17  0715 03/14/17  0725 03/13/17  1148 03/12/17  0736  03/11/17  1808   WBC  --  7.8  --   --  5.1  --  8.0   HGB  --  11.0*  --  10.8* 10.9*  --   11.0*   MCV  --  101*  --   --  101*  --  100   PLT  --  273  --   --  300  --  310   INR 2.84* 2.82* 2.62* 2.28* 1.75*  --   --    NA  --  136  --  135 137  --   --    POTASSIUM  --  4.1  --  3.8 4.1  < >  --    CHLORIDE  --  101  --  97 100  --   --    CO2  --  25  --  29 29  --   --    BUN  --  40*  --  36* 27  --   --    CR  --  1.24  --  1.28* 1.19  --   --    ANIONGAP  --  10  --  9 8  --   --    SARAH  --  8.5  --  8.4* 8.1*  --   --    GLC  --  164*  --  186* 218*  --   --    < > = values in this interval not displayed.    No results found for this or any previous visit (from the past 24 hour(s)).

## 2017-03-16 NOTE — PLAN OF CARE
Problem: Goal Outcome Summary  Goal: Goal Outcome Summary  Outcome: Improving  intermittent confusion, agitation and restlessness continues, family states this is baseline for patient. Pt is cooperative with cares once calm. AVSS, tele afib CVR at rest/RVR with activity. A-1 with walker & gait belt. ecchymosis around pelvic region, improvement noted since last encounter. LS course in bases, dry cough persists. Incontinent of urine at times. Wound consult today, may keep meplex in place for prevention, no pressure injury noted at this time. Tolerating reg diet, BS active, passing flatus.

## 2017-03-16 NOTE — PROGRESS NOTES
[facility]    Sepsis Evaluation Progress Note    Date of Service: 03/16/2017    I was called to see Mason Gonzalez due to abnormal vital signs triggering the Sepsis SIRS screening alert. He is known to have an infection.     Physical Exam    Vital Signs:  Temp: 97.2  F (36.2  C) Temp src: Axillary BP: 143/83 Pulse: 103 Heart Rate: 99 Resp: (!) 32 SpO2: 95 % O2 Device: Nasal cannula Oxygen Delivery: 3 LPM    Lab:  Lactic Acid   Date Value Ref Range Status   03/16/2017 2.3 (H) 0.7 - 2.1 mmol/L Final       The patient is at baseline mental status.    The rest of their physical exam is significant for breath sounds are not wheezy, tachypnea, however. . Pt restless, though not delirious.    Assessment and Plan    The SIRS and exam findings are likely multifactorial. Suspected pneumonia, blood loss anemia with extraperitoneal hemorrhage.     Disposition: The patient will remain on the current unit. We will continue to monitor this patient closely.    Wayne Inman MD

## 2017-03-16 NOTE — PROGRESS NOTES
Patient is on 1L nasal cannula with SpO2 94%, BBS expiratory wheezes, Nebs tx is given.    Naz Roca RRT  3/16/2017

## 2017-03-17 ENCOUNTER — APPOINTMENT (OUTPATIENT)
Dept: PHYSICAL THERAPY | Facility: CLINIC | Age: 82
DRG: 813 | End: 2017-03-17
Attending: INTERNAL MEDICINE
Payer: COMMERCIAL

## 2017-03-17 ENCOUNTER — APPOINTMENT (OUTPATIENT)
Dept: ULTRASOUND IMAGING | Facility: CLINIC | Age: 82
DRG: 813 | End: 2017-03-17
Attending: INTERNAL MEDICINE
Payer: COMMERCIAL

## 2017-03-17 LAB
ALBUMIN SERPL-MCNC: 2.7 G/DL (ref 3.4–5)
ALP SERPL-CCNC: 81 U/L (ref 40–150)
ALT SERPL W P-5'-P-CCNC: 23 U/L (ref 0–70)
ANION GAP SERPL CALCULATED.3IONS-SCNC: 10 MMOL/L (ref 3–14)
AST SERPL W P-5'-P-CCNC: 42 U/L (ref 0–45)
BACTERIA SPEC CULT: NO GROWTH
BACTERIA SPEC CULT: NO GROWTH
BASOPHILS # BLD AUTO: 0 10E9/L (ref 0–0.2)
BASOPHILS NFR BLD AUTO: 0.1 %
BILIRUB DIRECT SERPL-MCNC: 3.3 MG/DL (ref 0–0.2)
BILIRUB SERPL-MCNC: 4.7 MG/DL (ref 0.2–1.3)
BUN SERPL-MCNC: 33 MG/DL (ref 7–30)
CALCIUM SERPL-MCNC: 8.8 MG/DL (ref 8.5–10.1)
CHLORIDE SERPL-SCNC: 101 MMOL/L (ref 94–109)
CO2 SERPL-SCNC: 27 MMOL/L (ref 20–32)
COPATH REPORT: NORMAL
CREAT SERPL-MCNC: 1.21 MG/DL (ref 0.66–1.25)
DIFFERENTIAL METHOD BLD: NORMAL
EOSINOPHIL # BLD AUTO: 0.1 10E9/L (ref 0–0.7)
EOSINOPHIL NFR BLD AUTO: 1.3 %
ERYTHROCYTE [DISTWIDTH] IN BLOOD BY AUTOMATED COUNT: 17.3 % (ref 10–15)
GFR SERPL CREATININE-BSD FRML MDRD: 56 ML/MIN/1.7M2
GLUCOSE SERPL-MCNC: 97 MG/DL (ref 70–99)
HCT VFR BLD AUTO: 36.1 % (ref 40–53)
HGB BLD-MCNC: 12.2 G/DL (ref 13.3–17.7)
IMM GRANULOCYTES # BLD: 0.1 10E9/L (ref 0–0.4)
IMM GRANULOCYTES NFR BLD: 0.7 %
INR PPP: 2.63 (ref 0.86–1.14)
LDH SERPL L TO P-CCNC: 540 U/L (ref 85–227)
LYMPHOCYTES # BLD AUTO: 0.8 10E9/L (ref 0.8–5.3)
LYMPHOCYTES NFR BLD AUTO: 10.1 %
Lab: NORMAL
Lab: NORMAL
MAGNESIUM SERPL-MCNC: 2.4 MG/DL (ref 1.6–2.3)
MCH RBC QN AUTO: 34.9 PG (ref 26.5–33)
MCHC RBC AUTO-ENTMCNC: 33.8 G/DL (ref 31.5–36.5)
MCV RBC AUTO: 103 FL (ref 78–100)
MICRO REPORT STATUS: NORMAL
MICRO REPORT STATUS: NORMAL
MONOCYTES # BLD AUTO: 0.5 10E9/L (ref 0–1.3)
MONOCYTES NFR BLD AUTO: 5.8 %
NEUTROPHILS # BLD AUTO: 6.8 10E9/L (ref 1.6–8.3)
NEUTROPHILS NFR BLD AUTO: 82 %
PLATELET # BLD AUTO: 266 10E9/L (ref 150–450)
POTASSIUM SERPL-SCNC: 4.4 MMOL/L (ref 3.4–5.3)
PROT SERPL-MCNC: 6.7 G/DL (ref 6.8–8.8)
RBC # BLD AUTO: 3.5 10E12/L (ref 4.4–5.9)
RETICS # AUTO: 260.1 10E9/L (ref 25–95)
RETICS/RBC NFR AUTO: 7.4 % (ref 0.5–2)
SODIUM SERPL-SCNC: 138 MMOL/L (ref 133–144)
SPECIMEN SOURCE: NORMAL
SPECIMEN SOURCE: NORMAL
WBC # BLD AUTO: 8.3 10E9/L (ref 4–11)

## 2017-03-17 PROCEDURE — 83010 ASSAY OF HAPTOGLOBIN QUANT: CPT | Performed by: INTERNAL MEDICINE

## 2017-03-17 PROCEDURE — 25000132 ZZH RX MED GY IP 250 OP 250 PS 637: Performed by: INTERNAL MEDICINE

## 2017-03-17 PROCEDURE — 25000128 H RX IP 250 OP 636: Performed by: INTERNAL MEDICINE

## 2017-03-17 PROCEDURE — 80053 COMPREHEN METABOLIC PANEL: CPT | Performed by: INTERNAL MEDICINE

## 2017-03-17 PROCEDURE — 40000257 ZZH STATISTIC CONSULT NO CHARGE VASC ACCESS

## 2017-03-17 PROCEDURE — 82248 BILIRUBIN DIRECT: CPT | Performed by: INTERNAL MEDICINE

## 2017-03-17 PROCEDURE — 36415 COLL VENOUS BLD VENIPUNCTURE: CPT | Performed by: INTERNAL MEDICINE

## 2017-03-17 PROCEDURE — 40000847 ZZHCL STATISTIC MORPHOLOGY W/INTERP HISTOLOGY TC 85060: Performed by: INTERNAL MEDICINE

## 2017-03-17 PROCEDURE — 85004 AUTOMATED DIFF WBC COUNT: CPT | Performed by: INTERNAL MEDICINE

## 2017-03-17 PROCEDURE — 76700 US EXAM ABDOM COMPLETE: CPT

## 2017-03-17 PROCEDURE — 85060 BLOOD SMEAR INTERPRETATION: CPT | Performed by: INTERNAL MEDICINE

## 2017-03-17 PROCEDURE — 25000125 ZZHC RX 250: Performed by: HOSPITALIST

## 2017-03-17 PROCEDURE — 25000132 ZZH RX MED GY IP 250 OP 250 PS 637: Performed by: PHYSICIAN ASSISTANT

## 2017-03-17 PROCEDURE — 25000128 H RX IP 250 OP 636: Performed by: PHYSICIAN ASSISTANT

## 2017-03-17 PROCEDURE — 12000007 ZZH R&B INTERMEDIATE

## 2017-03-17 PROCEDURE — 25000128 H RX IP 250 OP 636: Performed by: HOSPITALIST

## 2017-03-17 PROCEDURE — 97530 THERAPEUTIC ACTIVITIES: CPT | Mod: GP | Performed by: PHYSICAL THERAPIST

## 2017-03-17 PROCEDURE — 83735 ASSAY OF MAGNESIUM: CPT | Performed by: INTERNAL MEDICINE

## 2017-03-17 PROCEDURE — 40000193 ZZH STATISTIC PT WARD VISIT: Performed by: PHYSICAL THERAPIST

## 2017-03-17 PROCEDURE — 25000132 ZZH RX MED GY IP 250 OP 250 PS 637: Performed by: HOSPITALIST

## 2017-03-17 PROCEDURE — 99233 SBSQ HOSP IP/OBS HIGH 50: CPT | Performed by: INTERNAL MEDICINE

## 2017-03-17 PROCEDURE — 85045 AUTOMATED RETICULOCYTE COUNT: CPT | Performed by: INTERNAL MEDICINE

## 2017-03-17 PROCEDURE — 85027 COMPLETE CBC AUTOMATED: CPT | Performed by: INTERNAL MEDICINE

## 2017-03-17 PROCEDURE — 94640 AIRWAY INHALATION TREATMENT: CPT | Mod: 76

## 2017-03-17 PROCEDURE — 85610 PROTHROMBIN TIME: CPT | Performed by: INTERNAL MEDICINE

## 2017-03-17 PROCEDURE — 83615 LACTATE (LD) (LDH) ENZYME: CPT | Performed by: INTERNAL MEDICINE

## 2017-03-17 PROCEDURE — 36569 INSJ PICC 5 YR+ W/O IMAGING: CPT

## 2017-03-17 PROCEDURE — 40000275 ZZH STATISTIC RCP TIME EA 10 MIN

## 2017-03-17 PROCEDURE — 25000125 ZZHC RX 250: Performed by: INTERNAL MEDICINE

## 2017-03-17 PROCEDURE — 94640 AIRWAY INHALATION TREATMENT: CPT

## 2017-03-17 PROCEDURE — 27211289 ZZ H KIT CATH IV 4FR 8 CM OR 10 CM, POWERWAND QUICK XL

## 2017-03-17 RX ORDER — LORATADINE 10 MG/1
10 TABLET ORAL DAILY
Status: DISCONTINUED | OUTPATIENT
Start: 2017-03-17 | End: 2017-03-19 | Stop reason: HOSPADM

## 2017-03-17 RX ORDER — FUROSEMIDE 10 MG/ML
20 INJECTION INTRAMUSCULAR; INTRAVENOUS
Status: DISCONTINUED | OUTPATIENT
Start: 2017-03-17 | End: 2017-03-19 | Stop reason: HOSPADM

## 2017-03-17 RX ORDER — BENZONATATE 100 MG/1
200 CAPSULE ORAL 3 TIMES DAILY
Status: DISCONTINUED | OUTPATIENT
Start: 2017-03-17 | End: 2017-03-19 | Stop reason: HOSPADM

## 2017-03-17 RX ORDER — METHYLPREDNISOLONE SODIUM SUCCINATE 40 MG/ML
40 INJECTION, POWDER, LYOPHILIZED, FOR SOLUTION INTRAMUSCULAR; INTRAVENOUS EVERY 8 HOURS
Status: DISCONTINUED | OUTPATIENT
Start: 2017-03-17 | End: 2017-03-19 | Stop reason: HOSPADM

## 2017-03-17 RX ADMIN — SENNOSIDES 1 TABLET: 8.6 TABLET, FILM COATED ORAL at 22:41

## 2017-03-17 RX ADMIN — ONDANSETRON 4 MG: 2 SOLUTION INTRAMUSCULAR; INTRAVENOUS at 06:19

## 2017-03-17 RX ADMIN — BENZONATATE 100 MG: 100 CAPSULE ORAL at 07:56

## 2017-03-17 RX ADMIN — MIRTAZAPINE 15 MG: 15 TABLET, FILM COATED ORAL at 22:41

## 2017-03-17 RX ADMIN — LEVOTHYROXINE SODIUM 200 MCG: 100 TABLET ORAL at 06:20

## 2017-03-17 RX ADMIN — LIDOCAINE HYDROCHLORIDE 3 ML: 10 INJECTION, SOLUTION EPIDURAL; INFILTRATION; INTRACAUDAL; PERINEURAL at 13:55

## 2017-03-17 RX ADMIN — METOPROLOL TARTRATE 100 MG: 100 TABLET, FILM COATED ORAL at 21:00

## 2017-03-17 RX ADMIN — BENZONATATE 200 MG: 100 CAPSULE ORAL at 22:41

## 2017-03-17 RX ADMIN — PIPERACILLIN AND TAZOBACTAM 4.5 G: 4; .5 INJECTION, POWDER, FOR SOLUTION INTRAVENOUS at 14:39

## 2017-03-17 RX ADMIN — IPRATROPIUM BROMIDE AND ALBUTEROL SULFATE 3 ML: .5; 3 SOLUTION RESPIRATORY (INHALATION) at 07:12

## 2017-03-17 RX ADMIN — ALBUTEROL SULFATE 2.5 MG: 2.5 SOLUTION RESPIRATORY (INHALATION) at 00:42

## 2017-03-17 RX ADMIN — Medication 12.5 MG: at 01:21

## 2017-03-17 RX ADMIN — PIPERACILLIN AND TAZOBACTAM 4.5 G: 4; .5 INJECTION, POWDER, FOR SOLUTION INTRAVENOUS at 05:12

## 2017-03-17 RX ADMIN — IPRATROPIUM BROMIDE AND ALBUTEROL SULFATE 3 ML: .5; 3 SOLUTION RESPIRATORY (INHALATION) at 15:58

## 2017-03-17 RX ADMIN — METHYLPREDNISOLONE SODIUM SUCCINATE 40 MG: 40 INJECTION, POWDER, FOR SOLUTION INTRAMUSCULAR; INTRAVENOUS at 22:42

## 2017-03-17 RX ADMIN — Medication 4 MG: at 01:20

## 2017-03-17 RX ADMIN — ACETYLCYSTEINE 2 ML: 200 INHALANT RESPIRATORY (INHALATION) at 00:42

## 2017-03-17 RX ADMIN — DIGOXIN 125 MCG: 125 TABLET ORAL at 07:56

## 2017-03-17 RX ADMIN — ACETYLCYSTEINE 2 ML: 200 INHALANT RESPIRATORY (INHALATION) at 07:12

## 2017-03-17 RX ADMIN — METHYLPREDNISOLONE SODIUM SUCCINATE 40 MG: 40 INJECTION, POWDER, FOR SOLUTION INTRAMUSCULAR; INTRAVENOUS at 14:39

## 2017-03-17 RX ADMIN — FUROSEMIDE 20 MG: 10 INJECTION, SOLUTION INTRAVENOUS at 14:23

## 2017-03-17 RX ADMIN — IPRATROPIUM BROMIDE AND ALBUTEROL SULFATE 3 ML: .5; 3 SOLUTION RESPIRATORY (INHALATION) at 11:38

## 2017-03-17 RX ADMIN — PIPERACILLIN AND TAZOBACTAM 4.5 G: 4; .5 INJECTION, POWDER, FOR SOLUTION INTRAVENOUS at 21:00

## 2017-03-17 RX ADMIN — ACETYLCYSTEINE 2 ML: 200 INHALANT RESPIRATORY (INHALATION) at 11:39

## 2017-03-17 NOTE — PLAN OF CARE
Problem: Goal Outcome Summary  Goal: Goal Outcome Summary  SLP: Bedside swallow eval orders received. Per RN report, pt unable to follow directions to complete swallow eval and is NPO for CT tonight. Will re-schedule and follow up tomorrow as appropriate.

## 2017-03-17 NOTE — PLAN OF CARE
Problem: Goal Outcome Summary  Goal: Goal Outcome Summary  PT: Patient requires encouragement from PT and nursing to perform activity for nursing cares. Patient requires modA of 1 to stand to FWW. Tolerated standing ~2 minutes with modA for cares, progressively becoming more flexed in knees and posture with fatigue. Requires max encouragement to maintain standing. Patient requires Mario of 2 for bed mobility. Recommend pt discharge to TCU.

## 2017-03-17 NOTE — PROGRESS NOTES
SW:     I: BASSAM following for discharge planning. BASSAM spoke with admissions at [facility] [place]. BASSAM updated that pt will likely be ready for discharge this weekend. SW to contact main number at facility ([phone]) and fax orders to [phone].    P: SW to follow.    MIRZA Bah, [facility]  Daytime (8:00am-4:30pm): [phone]  After-Hours SW Pager (4:30pm-11:30pm): [phone]

## 2017-03-17 NOTE — PROGRESS NOTES
PULMONARY CONSULT NOTE    Full consult dictated.    Active Problems:    Intraabdominal hemorrhage           Assessment and Plan:      89 yo male with multiple medical problems admitted with spontaneous extraperitoneal hemorrhage secondary to coagulopathy due to Coumadin. Consulted re: cough. Hospital course complicated by suspected HCAP. CT Chest 3/16 showed patchy bilateral areas of groundglass and interstitial infiltrate L>R. Patient's care has been compromised by his refusal of medications and therapy. RN raises concern for possible aspiration. DDx cough includes pneumonia, aspiration, reflux and sinus drainage. Recommendations as outlined below. Respiratory status currently stable on room air.    Diagnoses  Abnl CT/CXR  R91.8  Cough   R05  Pneum aspiration J69.0    Plan:  1. Bronchodilators - continue DuoNebs, D/C MucoMyst (could be irritating to the airways)  2. Antibiotics - Zosyn.  3. Steroids - start SoluMedrol.  4. Diuresis - Lasix as ordered.  5. Antitussives - increase Tessalon Perles dose to 200 mg TID.  6. D/C Lidocaine nebs (increases risk for aspiration).  7. PPI - Protonix.  8. Aspiration precautions.  9. Follow CXR.    Thanks, will follow.      Kian Ramsey MD    [facility]  [phone] (pager)  [phone] (office)

## 2017-03-17 NOTE — PROGRESS NOTES
[facility]  Hospitalist Progress Note    Ce Patrick MD  03/17/2017    Assessment & Plan      Mason Gonzalez is a 89 year old male with past medical history of atrial fibrillation on chronic anticoagulation, hypothyroidism and diet-controlled type 2 diabetes who presented from outside hospital for evaluation of an extraperitoneal hemorrhage and was admitted on 3/7/17.     Spontaneous extraperitoneal hemorrhage 2/2 coagulopathy in the context of warfarin use:   CT scan at outside facility showed an 8.4 cm extraperitoneal hemorrhage in the right pelvis surrounding the bladder.  Hemoglobin was stable at 13.6; however, INR was elevated at 5.8.  S/p vitamin K at [facility].   Repeat CT abd/pelvis on admission showed increased size of extraperitoneal hematoma along the anterior aspect of the bladder which was measured 11.6 cm.   -  Received Kcentra, vitamin K and FFP upon admission  -  Hgb remained stable at 11-12 grams.  -  Seen by general surg and rec was non-surg management.  Warfarin reintroduced on 3/9 but has been on hold since 3/13.  -  Pain controlled with PTA tramadol.    Acute blood loss anemia:   Secondary to above.  Hgb  was13 at outside hospital, down to 11-12 grams her but remained stable.  Monitor CBC.    Atrial fibrillation with RVR:   Has a h/o afib, RVR was probably due to missed metoprolol dose and acute abd pain.  PTA Metoprolol dose increased and digoxin added.  HR better this am.  Continue Metoprolol 100 mg BID and digoxin 0.125 mg daily.  Check dig level in am.  INR was reversed as above and reintroduced on 3/9.  INR jumped from 1.75 ---> 2.28 --> coumadin held ---> 2.62 ---> 2.84 ---> 2.63 today.  Coumadin has been on hold since 3/13.  LFTs wnl except for elevated total bili.  Elevated INR may be due to poor intake.  Continue holding off coumadin.    Elevated Total Bili:  ? Hemolysis.  Check peripheral smear, retic count, LDH and haptoglobin.  Will also obtain abd US to  rule out cholestasis.      Hypothyroidism:   Continue prior to admission Synthroid.     Diet controlled DM-II:   Well controlled w/ diet.  Monitor.      Acute on chronic systolic CHF:   Has a h/o CHF per [facility] record.  Echo on 3/10/17, this admissions showed EF of 50-55% and mod to severe TR.  Rhythm was afib during TTE.  I/O neg 213 since admi.  Wt is up by 2 lbs since admit.  Lasix has been on hold due to worsening renal failure.  Pt has intractable cough, ? Decompensated CHF.  Start lasix 20 mg iv bid.        ARF:   Likely pre-renal.  Baseline cr unknown.  Admit cr was 1.2 ---> ---> 1.28 ---> 1.24 ---> 1.21 today.  Reintroduce Lasix 20 mg iv bid.  Avoid other Nephrotoxins.      Hypokalemia:   On replacement protocol.    Suspected HCAP/atypical PNA:   Persistent severe wheezing, dyspnea and dry cough.    -  Repeat CXR 3/11 showed coarse Lt lung infiltrates worse than prior.  -  Blood cultures x2 drawn 3/11 and started on Zosyn and azithromycin, Azithromycin d/aiyana on 3/13 because of it's interaction w/ coumadin.    -  Received short course of steroid.  -  Continue to have intractable non-productive cough.  Not on ACE I.  -  CT chest w/o contrast on 3/16 discovered patchy groundglass and interstitial opacities, greater on the left which could be due to pneumonia or edema.  Small right pleural effusion.  Mildly aneurysmal thoracic aorta measuring 4 cm in the ascending aorta and 4.3 cm in the upper descending thoracic aorta.  There is also small rind of soft tissue density adjacent to the upper descending thoracic aorta of uncertain significance  -  Continue acapella valve treatment, schedule Tessalon tid and prn mucomyst neb.  He received Lidocaine neb twice.  Start Lasix 20 mg iv bid.  He is also on protonix.  Add Claritin 10 mg po daily.  SLP consult requested.  -  Will consult w/ pulm service as well.    Obstructive sleep apnea:   Does not use CPAP.     Influenza prophylaxis:  He was initiated on Tamiflu  prophylaxis at long-term care facility for 10 days total, started 3/6. Complete course.     Physical deconditioning:   - he is very weak at this time.   - PT eval noted.  Plan is to dc back to LTC    Deep venous thrombosis prophylaxis:   Warfarin      CODE STATUS:  Full    Dispo:   Anticipate d/c in 1-2 days if cough is better.    Interval History      C/o intractable non-productive cough.  Did not sleep well last night.    -Data reviewed today: I reviewed all new labs and imaging over the last 24 hours.     Physical Exam   Heart Rate: 91, Blood pressure 92/54, pulse 99, temperature 97.9  F (36.6  C), temperature source Oral, resp. rate 24, height 1.829 m (6'), weight 88.4 kg (194 lb 14.2 oz), SpO2 93 %.  Vitals:    03/15/17 0455 03/16/17 0500 03/17/17 0440   Weight: 92.8 kg (204 lb 9.4 oz) 88.5 kg (195 lb 1.7 oz) 88.4 kg (194 lb 14.2 oz)     Vital Signs with Ranges  Temp:  [97.2  F (36.2  C)-98  F (36.7  C)] 97.9  F (36.6  C)  Pulse:  [] 99  Heart Rate:  [91] 91  Resp:  [18-30] 24  BP: ()/(54-82) 92/54  SpO2:  [91 %-98 %] 93 %  I/O's Last 24 hours  I/O last 3 completed shifts:  In: 710 [P.O.:710]  Out: 125 [Urine:125]    Constitutional: Alert, awake, and no apparent distress receiving neb now  HEENT: PERRLA, EOMI, mucosa dry now  Respiratory: Coarse b/l but no wheezing now. Not in distress. On room air.  Cardiovascular: irregular, no m/r/g  Abdomen: soft, non-distended, non tenderness, ecchymosis Rt lower abd to scrotum stable  Skin/Integumen:  Ecchymosis  right lower abdomen/scrotum stable, non tender.      Medications   All medications I am responsible for were reviewed.       Warfarin Therapy Reminder         furosemide  20 mg Intravenous BID     piperacillin-tazobactam  4.5 g Intravenous Q6H     lidocaine inhalant  3 mL Nebulization Once     benzonatate  100 mg Oral TID     acetylcysteine  2 mL Nebulization Q4H     mirtazapine  15 mg Oral At Bedtime     digoxin  125 mcg Oral Daily     pantoprazole   40 mg Oral QAM     ipratropium - albuterol 0.5 mg/2.5 mg/3 mL  3 mL Nebulization 4x daily     metoprolol  100 mg Oral BID     sodium chloride (PF)  3 mL Intracatheter Q8H     levothyroxine (SYNTHROID/LEVOTHROID) tablet 200 mcg  200 mcg Oral Daily     sennosides  1 tablet Oral At Bedtime        Data     Recent Labs  Lab 03/17/17  0743 03/16/17  0744 03/15/17  0715  03/13/17  1148 03/12/17  0736   WBC 8.3  --  7.8  --   --  5.1   HGB 12.2*  --  11.0*  --  10.8* 10.9*   *  --  101*  --   --  101*     --  273  --   --  300   INR 2.63* 2.84* 2.82*  < > 2.28* 1.75*     --  136  --  135 137   POTASSIUM 4.4  --  4.1  --  3.8 4.1   CHLORIDE 101  --  101  --  97 100   CO2 27  --  25  --  29 29   BUN 33*  --  40*  --  36* 27   CR 1.21  --  1.24  --  1.28* 1.19   ANIONGAP 10  --  10  --  9 8   SARAH 8.8  --  8.5  --  8.4* 8.1*   GLC 97  --  164*  --  186* 218*   ALBUMIN 2.7*  --   --   --   --   --    PROTTOTAL 6.7*  --   --   --   --   --    BILITOTAL 4.7*  --   --   --   --   --    ALKPHOS 81  --   --   --   --   --    ALT 23  --   --   --   --   --    AST 42  --   --   --   --   --    < > = values in this interval not displayed.    Recent Results (from the past 24 hour(s))   CT Chest w/o Contrast    Narrative    CT CHEST WITHOUT CONTRAST  3/16/2017 4:33 PM     HISTORY: Intractable non-productive cough.    TECHNIQUE: Volumetric acquisition of the chest  without contrast. .  Radiation dose for this scan was reduced using automated exposure  control, adjustment of the mA and/or kV according to patient size, or  iterative reconstruction technique.    COMPARISON: None.    FINDINGS: Patchy bilateral areas of groundglass and interstitial  infiltrate, greater on the left. 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 Small right pleural  effusion. Borderline cardiomegaly. Extensive coronary artery  calcifications. There  are a few small, nonspecific mediastinal lymph  nodes. Atheromatous calcification of the thoracic aorta. The thoracic  aorta is mildly aneurysmal measuring 4 cm in the ascending aorta and  4.3 cm in diameter in the upper descending thoracic aorta. Adjacent to  the upper descending thoracic aorta there is a small rind of density  measuring approximately 2.8 x 1.4 x 6.3 cm in AP, transverse and  craniocaudad dimensions. (Axial image 23 and coronal image 45). This  is of uncertain etiology and significance and may be chronic. It could  be minimal loculated fluid though it measures higher density than  simple fluid. It could also be a thin rind of soft tissue density of  uncertain significance. It is conceivable that it could arise from the  aorta such as a contained leak though that is felt to be less likely.  Correlate clinically. Advanced degenerative changes in the thoracic  spine. Hemangioma in a lower thoracic vertebral body.      Impression    IMPRESSION:  1. Patchy groundglass and interstitial opacities, greater on the left  which could be due to pneumonia or edema. Small right pleural  effusion. Correlate clinically to exclude CHF.  2. Mild thyromegaly. Extensive coronary artery calcifications.  3. Mildly aneurysmal thoracic aorta measuring 4 cm in the ascending  aorta and 4.3 cm in the upper descending thoracic aorta.  4. Small rind of soft tissue density adjacent to the upper descending  thoracic aorta of uncertain significance. While it could be some  loculated fluid is not the density of simple fluid. This could be  complex fluid or soft tissue or scarring. A contained leak from the  thoracic aorta could not be completely excluded. This is felt to most  likely not be an acute finding. However clinical correlation  recommended. Consider follow-up imaging with contrast or comparison  with old studies.    Findings discussed with Dr. Patrick at 5:50 PM    MUNIR SAINI MD

## 2017-03-17 NOTE — PLAN OF CARE
Problem: Goal Outcome Summary  Goal: Goal Outcome Summary  Outcome: No Change  Vss, alert and oriented to self.  Forgetful. Denies pain. Frequent loose cough, nonproductive. Crackles in lower lung quadrants.  Incontinent of urine.  Continue iv abx.  Tele: afib uncontrolled.  2-4L O2. CHO diet.  Up with assist of 2.

## 2017-03-17 NOTE — PLAN OF CARE
Problem: Goal Outcome Summary  Goal: Goal Outcome Summary  Outcome: Improving  intermittent confusion, agitation and restlessness continues, family states this is baseline for patient. AVSS, tele afib CVR at rest/RVR with activity. A-1 with walker & gait belt. Pt refusing all activity today, pt lethargic, MD updated, new orders received regarding course of treatment.  ecchymosis around pelvic region, improvement noted since last encounter. LS course in bases, dry cough persists. Incontinent of urine at times. Barrier cream placed in scrotal and groin region due to increased redness/irritation. Tolerating reg diet, BS active, passing flatus. NPO at this time awaiting CT of abdomen. Midline catheter placed today.

## 2017-03-17 NOTE — CONSULTS
PULMONARY CONSULTATION      DATE OF DICTATION:  03/17/2017      REQUESTING PHYSICIAN:  None stated.       REASON FOR CONSULTATION:  Cough.      HISTORY OF PRESENT ILLNESS:  Mason Gonzalez is a 89-year-old male with multiple medical problems who was admitted to [facility], 03/07/2017, with spontaneous extraperitoneal hemorrhage secondary to coagulopathy from his Coumadin.  He is maintained on chronic anticoagulation for atrial fibrillation.  He presented to an outside hospital where a CT scan showed an 8.4 cm extraperitoneal hemorrhage in the right pelvis surrounding the bladder.  INR was elevated at 5.8.  He was treated with vitamin K.      HOSPITAL COURSE:  Has been complicated by development of suspected healthcare-associated pneumonia as the patient developed some wheezing, dyspnea and dry cough.  Chest x-ray on 03/08/2017 showed some mild interstitial changes, but no focal infiltrates.  Repeat chest x-ray, 03/11, showed increased opacity suggestive of worsening pneumonia.  CT scan of the chest done yesterday showed patchy ground-glass and interstitial opacities, left greater than right, consistent with pneumonia versus edema.  He has been started on antibiotics.  We were consulted regarding a persistent intractable, typically nonproductive cough which caused him to sleep poorly last night.  He has not been having any fevers, chills or night sweats.  His cough is typically nonproductive.  He denies chest pain or hemoptysis.  He has been receiving a variety of respiratory therapy without any clear benefit to his cough.  He also has a history of obstructive sleep apnea, but does not use CPAP.      CURRENT MEDICATIONS:  Include Tessalon Perles, Lanoxin, Lasix, DuoNeb, Synthroid, Claritin, Lopressor, Remeron, Protonix, Zosyn, Senokot, Coumadin, lidocaine nebs and Mucomyst.      PAST MEDICAL HISTORY:   1.  Atrial fibrillation, on chronic anticoagulation.   2.  Hypothyroidism.   3.  Diabetes mellitus.   4.  BPH.    5.  Obstructive sleep apnea, does not use CPAP.   6.  Depression.   7.  GERD.   8.  History of colon cancer status post right colectomy.   9.  Hypertension.   10.  Spinal stenosis.      ALLERGIES:  Codeine and lisinopril.      SOCIAL HISTORY:  The patient is not currently smoking.      FAMILY HISTORY AND REVIEW OF SYSTEMS:  Noncontributory.      PHYSICAL EXAMINATION:   GENERAL:  Reveals an elderly male, extremely hard of hearing, who refuses to put in his hearing aids and provides limited history.   VITAL SIGNS:  He is afebrile, respiratory rate is 24, pulse 99, blood pressure 92/54, oxygen saturations 93% to 95% on room air.   HEENT:  Grossly unremarkable.   NECK:  Supple.   LUNGS:  Revealed decreased breath sounds, slightly coarse bilaterally with a few crackles, but no wheezes.   CARDIOVASCULAR:  Revealed regular rate and rhythm.   ABDOMEN:  Soft and nontender.   EXTREMITIES:  Showed no cyanosis, clubbing or edema.      LABORATORY TESTING:  Electrolytes are normal.  BUN 33, creatinine 1.2.  White count 8.3, hemoglobin 12.2, hematocrit 36.1, platelet count is normal.  INR is now 2.63.      IMAGING STUDIES:  As above.      ASSESSMENT:  A 89-year-old male with multiple medical problems admitted with a spontaneous extraperitoneal hemorrhage secondary to coagulopathy.  Hospital course complicated by suspected healthcare-acquired pneumonia.  CT scan of the chest, 03/16, showed patchy bilateral areas of ground-glass and interstitial infiltrates, left greater than right.  The patient's care has been compromised by his refusal of medical therapy.  His nurse raises concern for possible aspiration.  His untreated sleep apnea places him at risk for nocturnal reflux.  Differential diagnosis of his cough includes pneumonia, aspiration, reflux and potentially sinus drainage.  His oxygenation is currently stable on room air.      PLAN:   1.  Bronchodilators, continue DuoNeb, but discontinued the Mucomyst, as this could be  irritating his airways.   2.  Antibiotics, Zosyn.   3.  Steroids, start Solu-Medrol.   4.  Lasix as ordered.   5.  Antitussives, increase Tessalon Perles 200 mg t.i.d.   6.  Discontinue lidocaine nebs, as this increases the patient's risk for aspiration.   7.  PPI   8.  Protonix.   9.  Aspiration precautions.   10.  Follow chest x-ray.      I appreciate the opportunity to participate in his care.         GREG WHITLOCK MD             D: 2017 12:30   T: 2017 13:38   MT: [id number]      Name:     HYUN COTA   MRN:      [id number]        Account:       [id number]   :      1926           Consult Date:  2017      Document: [id number]       cc: Ce Patrick MD

## 2017-03-17 NOTE — PLAN OF CARE
Problem: Goal Outcome Summary  Goal: Goal Outcome Summary  Outcome: No Change  Patient A/D to time and situation Forgetful. Denies pain or SOB. Frequent loose cough, nonproductive. Patient tachypenic at times on 2L NC other VSS. LS diminished with crackles in lower lobes. Incontinent of urine. Continue iv abx. Tele: afib controlled 2-4L O2. Tolerating CHO diet. Up with assist of 1-2. Patient restless and agitated at times & anxious given melatonin and Seroquel x1. Also used fan and aromatherapy. Dorsalis pedis pulses weak. Coccyx dressing CDI.  Will continue monitoring

## 2017-03-18 ENCOUNTER — APPOINTMENT (OUTPATIENT)
Dept: PHYSICAL THERAPY | Facility: CLINIC | Age: 82
DRG: 813 | End: 2017-03-18
Attending: INTERNAL MEDICINE
Payer: COMMERCIAL

## 2017-03-18 ENCOUNTER — APPOINTMENT (OUTPATIENT)
Dept: SPEECH THERAPY | Facility: CLINIC | Age: 82
DRG: 813 | End: 2017-03-18
Attending: INTERNAL MEDICINE
Payer: COMMERCIAL

## 2017-03-18 LAB
ANION GAP SERPL CALCULATED.3IONS-SCNC: 8 MMOL/L (ref 3–14)
BUN SERPL-MCNC: 35 MG/DL (ref 7–30)
CALCIUM SERPL-MCNC: 8.5 MG/DL (ref 8.5–10.1)
CHLORIDE SERPL-SCNC: 99 MMOL/L (ref 94–109)
CO2 SERPL-SCNC: 28 MMOL/L (ref 20–32)
CREAT SERPL-MCNC: 1.21 MG/DL (ref 0.66–1.25)
DIGOXIN SERPL-MCNC: 0.7 UG/L (ref 0.5–2)
GFR SERPL CREATININE-BSD FRML MDRD: 56 ML/MIN/1.7M2
GLUCOSE SERPL-MCNC: 211 MG/DL (ref 70–99)
INR PPP: 2.01 (ref 0.86–1.14)
POTASSIUM SERPL-SCNC: 4.4 MMOL/L (ref 3.4–5.3)
SODIUM SERPL-SCNC: 135 MMOL/L (ref 133–144)

## 2017-03-18 PROCEDURE — 80048 BASIC METABOLIC PNL TOTAL CA: CPT | Performed by: INTERNAL MEDICINE

## 2017-03-18 PROCEDURE — 94640 AIRWAY INHALATION TREATMENT: CPT | Mod: 76

## 2017-03-18 PROCEDURE — 25000132 ZZH RX MED GY IP 250 OP 250 PS 637: Performed by: INTERNAL MEDICINE

## 2017-03-18 PROCEDURE — 25000132 ZZH RX MED GY IP 250 OP 250 PS 637: Performed by: PHYSICIAN ASSISTANT

## 2017-03-18 PROCEDURE — 40000239 ZZH STATISTIC VAT ROUNDS

## 2017-03-18 PROCEDURE — 99232 SBSQ HOSP IP/OBS MODERATE 35: CPT | Performed by: INTERNAL MEDICINE

## 2017-03-18 PROCEDURE — 40000193 ZZH STATISTIC PT WARD VISIT: Performed by: PHYSICAL THERAPIST

## 2017-03-18 PROCEDURE — 92610 EVALUATE SWALLOWING FUNCTION: CPT | Mod: GN | Performed by: SPEECH-LANGUAGE PATHOLOGIST

## 2017-03-18 PROCEDURE — 25000132 ZZH RX MED GY IP 250 OP 250 PS 637: Performed by: HOSPITALIST

## 2017-03-18 PROCEDURE — 40000275 ZZH STATISTIC RCP TIME EA 10 MIN

## 2017-03-18 PROCEDURE — 85610 PROTHROMBIN TIME: CPT | Performed by: INTERNAL MEDICINE

## 2017-03-18 PROCEDURE — 12000000 ZZH R&B MED SURG/OB

## 2017-03-18 PROCEDURE — 36415 COLL VENOUS BLD VENIPUNCTURE: CPT | Performed by: INTERNAL MEDICINE

## 2017-03-18 PROCEDURE — 25000125 ZZHC RX 250: Performed by: HOSPITALIST

## 2017-03-18 PROCEDURE — 80162 ASSAY OF DIGOXIN TOTAL: CPT | Performed by: INTERNAL MEDICINE

## 2017-03-18 PROCEDURE — 40000225 ZZH STATISTIC SLP WARD VISIT: Performed by: SPEECH-LANGUAGE PATHOLOGIST

## 2017-03-18 PROCEDURE — 25000128 H RX IP 250 OP 636: Performed by: HOSPITALIST

## 2017-03-18 PROCEDURE — 97116 GAIT TRAINING THERAPY: CPT | Mod: GP | Performed by: PHYSICAL THERAPIST

## 2017-03-18 PROCEDURE — 25000128 H RX IP 250 OP 636: Performed by: INTERNAL MEDICINE

## 2017-03-18 RX ORDER — WARFARIN SODIUM 1 MG/1
1 TABLET ORAL
Status: COMPLETED | OUTPATIENT
Start: 2017-03-18 | End: 2017-03-18

## 2017-03-18 RX ADMIN — SENNOSIDES 1 TABLET: 8.6 TABLET, FILM COATED ORAL at 20:54

## 2017-03-18 RX ADMIN — BENZONATATE 200 MG: 100 CAPSULE ORAL at 09:14

## 2017-03-18 RX ADMIN — PIPERACILLIN AND TAZOBACTAM 4.5 G: 4; .5 INJECTION, POWDER, FOR SOLUTION INTRAVENOUS at 14:22

## 2017-03-18 RX ADMIN — METHYLPREDNISOLONE SODIUM SUCCINATE 40 MG: 40 INJECTION, POWDER, FOR SOLUTION INTRAMUSCULAR; INTRAVENOUS at 05:56

## 2017-03-18 RX ADMIN — BENZONATATE 200 MG: 100 CAPSULE ORAL at 16:40

## 2017-03-18 RX ADMIN — METOPROLOL TARTRATE 100 MG: 100 TABLET, FILM COATED ORAL at 20:55

## 2017-03-18 RX ADMIN — LORATADINE 10 MG: 10 TABLET ORAL at 09:14

## 2017-03-18 RX ADMIN — PIPERACILLIN AND TAZOBACTAM 4.5 G: 4; .5 INJECTION, POWDER, FOR SOLUTION INTRAVENOUS at 02:57

## 2017-03-18 RX ADMIN — WARFARIN SODIUM 1 MG: 1 TABLET ORAL at 18:34

## 2017-03-18 RX ADMIN — Medication 4 MG: at 01:35

## 2017-03-18 RX ADMIN — METHYLPREDNISOLONE SODIUM SUCCINATE 40 MG: 40 INJECTION, POWDER, FOR SOLUTION INTRAMUSCULAR; INTRAVENOUS at 20:54

## 2017-03-18 RX ADMIN — METOPROLOL TARTRATE 100 MG: 100 TABLET, FILM COATED ORAL at 09:14

## 2017-03-18 RX ADMIN — DIGOXIN 125 MCG: 125 TABLET ORAL at 09:14

## 2017-03-18 RX ADMIN — BENZONATATE 200 MG: 100 CAPSULE ORAL at 20:55

## 2017-03-18 RX ADMIN — FUROSEMIDE 20 MG: 10 INJECTION, SOLUTION INTRAVENOUS at 09:13

## 2017-03-18 RX ADMIN — PIPERACILLIN AND TAZOBACTAM 4.5 G: 4; .5 INJECTION, POWDER, FOR SOLUTION INTRAVENOUS at 20:54

## 2017-03-18 RX ADMIN — LEVOTHYROXINE SODIUM 200 MCG: 100 TABLET ORAL at 05:56

## 2017-03-18 RX ADMIN — FUROSEMIDE 20 MG: 10 INJECTION, SOLUTION INTRAVENOUS at 16:40

## 2017-03-18 RX ADMIN — PIPERACILLIN AND TAZOBACTAM 4.5 G: 4; .5 INJECTION, POWDER, FOR SOLUTION INTRAVENOUS at 09:25

## 2017-03-18 RX ADMIN — Medication 12.5 MG: at 01:35

## 2017-03-18 RX ADMIN — METHYLPREDNISOLONE SODIUM SUCCINATE 40 MG: 40 INJECTION, POWDER, FOR SOLUTION INTRAMUSCULAR; INTRAVENOUS at 14:21

## 2017-03-18 RX ADMIN — IPRATROPIUM BROMIDE AND ALBUTEROL SULFATE 3 ML: .5; 3 SOLUTION RESPIRATORY (INHALATION) at 12:19

## 2017-03-18 RX ADMIN — MIRTAZAPINE 15 MG: 15 TABLET, FILM COATED ORAL at 20:55

## 2017-03-18 RX ADMIN — PANTOPRAZOLE SODIUM 40 MG: 40 TABLET, DELAYED RELEASE ORAL at 09:14

## 2017-03-18 RX ADMIN — IPRATROPIUM BROMIDE AND ALBUTEROL SULFATE 3 ML: .5; 3 SOLUTION RESPIRATORY (INHALATION) at 19:54

## 2017-03-18 RX ADMIN — IPRATROPIUM BROMIDE AND ALBUTEROL SULFATE 3 ML: .5; 3 SOLUTION RESPIRATORY (INHALATION) at 17:19

## 2017-03-18 NOTE — PLAN OF CARE
Problem: Goal Outcome Summary  Goal: Goal Outcome Summary  Outcome: No Change  VSS except Tele Afib RVR ( CVR after lopressor given).  2L oxygen,confused to situation, time. Inspiratory and expiratory wheezes posterior. Frequent NPC. Agitated earlier yelling at staff, threatening to leave, called his daughter-he was hungry (was NPO for ultrasound). Mood improved after he was able to eat. Bowel sounds active, passing gas. Ecchymotic area on Abdomen, flank and bilateral Arms. Adequate urine output. Incontinent of urine. Mohini area/ scrotum and penis red- cream applied. [place]- refuses to wear hearing aids. Up with assist of 2 gait belt to w/c- stand and pivot. 1+ edema in bilateral feet and ankles.

## 2017-03-18 NOTE — PROGRESS NOTES
PULMONOLOGY PROGRESS NOTE  Date of service: 2017  [facility]    CC/Reason for Visit: Cough    SUBJECTIVE      HPI: Events of last night reviewed. Stable night. No new respiratory problems. States cough is better today, less frequent. Patient's family is at bedside, and confirms that he is coughing less.    ROS: A Problem Pertinent review of systems was negative except for items noted in HPI.    Past Medical, Family, and Social/Substance History has been reviewed: No interval changes.    OBJECTIVE   /57 (BP Location: Right arm)  Pulse 65  Temp 98  F (36.7  C)  Resp 18  Ht 1.829 m (6')  Wt 88.4 kg (194 lb 14.2 oz)  SpO2 91%  BMI 26.43 kg/m2 2 L/min     Temp (24hrs), Av.7  F (36.5  C), Min:97.3  F (36.3  C), Max:98.1  F (36.7  C)       Intake/Output Summary (Last 24 hours) at 17 1733  Last data filed at 17 1600   Gross per 24 hour   Intake             1630 ml   Output             1025 ml   Net              605 ml     Patient Vitals for the past 96 hrs:   Weight   17 0440 88.4 kg (194 lb 14.2 oz)   17 0500 88.5 kg (195 lb 1.7 oz)   03/15/17 0455 92.8 kg (204 lb 9.4 oz)       CONSTITUTIONAL/GENERAL APPEARANCE: Alert. No Apparent Distress.  PSYCHIATRIC: Pleasant and appropriate mood and affect. Oriented x 3.  EARS, NOSE,THROAT,MOUTH: External ears and nose overall normal. Normal oral mucosa.   NECK: Neck appearance normal. No neck masses and the thyroid is not enlarged.   RESPIRATORY: Non-labored effort. Decreased BS, fairly clear anteriorly.  CARDIOVASCULAR: S1, S2, regular rate and rhythm.      LABORATORY ASSESSMENT    No lab results found in last 7 days.    Recent Labs  Lab 17  0743 03/15/17  0715   WBC 8.3 7.8   RBC 3.50* 3.15*   HGB 12.2* 11.0*   HCT 36.1* 31.9*   * 101*   MCH 34.9* 34.9*   MCHC 33.8 34.5   RDW 17.3* 16.1*    273       Recent Labs  Lab 17  0615 17  0743 03/15/17  0715    138 136   POTASSIUM 4.4 4.4 4.1    CHLORIDE 99 101 101   CO2 28 27 25   ANIONGAP 8 10 10   BUN 35* 33* 40*   CR 1.21 1.21 1.24   GFRESTIMATED 56* 56* 55*   GFRESTBLACK 68 68 66   SARAH 8.5 8.8 8.5     No lab results found in last 7 days.    Recent Labs  Lab 03/18/17  0615   INR 2.01*       Additional labs and/or comments:    IMAGING      CT Chest 3/16 -  IMPRESSION:  1. Patchy groundglass and interstitial opacities, greater on the left  which could be due to pneumonia or edema. Small right pleural  effusion. Correlate clinically to exclude CHF.  2. Mild thyromegaly. Extensive coronary artery calcifications.  3. Mildly aneurysmal thoracic aorta measuring 4 cm in the ascending  aorta and 4.3 cm in the upper descending thoracic aorta.  4. Small rind of soft tissue density adjacent to the upper descending  thoracic aorta of uncertain significance. While it could be some  loculated fluid is not the density of simple fluid. This could be  complex fluid or soft tissue or scarring. A contained leak from the  thoracic aorta could not be completely excluded. This is felt to most  likely not be an acute finding. However clinical correlation  recommended. Consider follow-up imaging with contrast or comparison  with old studies.    PFT & OTHER TESTING       ASSESSMENT / PLAN      Active Problems:    Intraabdominal hemorrhage      ASSESSMENT: 89 yo male with multiple medical problems admitted with spontaneous extraperitoneal hemorrhage secondary to coagulopathy due to Coumadin. Consulted re: cough. Hospital course complicated by suspected HCAP. CT Chest 3/16 showed patchy bilateral areas of groundglass and interstitial infiltrate L>R. Patient's care has been compromised by his refusal of medications and therapy. RN raises concern for possible aspiration. DDx cough includes pneumonia, aspiration, reflux and sinus drainage. Cough seems better today with adjustments to medical regimen. Respiratory status stable on low flow O2.    Diagnoses  Abnl  CT/CXR  R91.8  Cough   R05  Pneum aspiration J69.0    Plan:  1. Bronchodilators - DuoNebs.  2. Antibiotics - Zosyn.  3. Steroids - SoluMedrol.  4. Diuresis - Lasix as ordered.  5. Antitussives - Tessalon Perles 200 mg TID.  6. PPI - Protonix.  7. Aspiration precautions.  8. Follow CXR.    Updated family at bedside.    No further suggestions at this time. Dr. Nicole will see pt in follow-up on Monday. Please call if needed over the WE.      Kian Ramsey MD    [facility] / [facility]  [phone] (pager)  [phone] (office)

## 2017-03-18 NOTE — PROVIDER NOTIFICATION
Call placed to hospitalist- patient yelling at staff stating he is hungry and wants to eat. Per Ultrasound patient will not be able to have ultrasound done now until 8pm. Can we just let patient eat and then keep him NPO and do Ultrasound in the AM?    -- no have ultrasound done tonight.

## 2017-03-18 NOTE — PLAN OF CARE
Problem: Goal Outcome Summary  Goal: Goal Outcome Summary  Outcome: No Change  VSS. 2L NC. Tele Afib CVR. Disorientated to time & situation. In & ex wheezes. Dry, non-productive cough. BS active, passing flatus. Voiding, urine marily. Ecchymotic around abdomen & flank areas. Assist of 2 with gait belt, pivots well. [place] - refusing to wear hearing aids. Tolerating Mod CHO diet, takes pills well. +1 edema feet.

## 2017-03-18 NOTE — PROGRESS NOTES
[facility]  Hospitalist Progress Note    Ce Patrick MD  03/18/2017    Assessment & Plan      Mason Gonzalez is a 89 year old male with past medical history of atrial fibrillation on chronic anticoagulation, hypothyroidism and diet-controlled type 2 diabetes who presented from outside hospital for evaluation of an extraperitoneal hemorrhage and was admitted on 3/7/17.     Spontaneous extraperitoneal hemorrhage 2/2 coagulopathy in the context of warfarin use:   CT scan at outside facility showed an 8.4 cm extraperitoneal hemorrhage in the right pelvis surrounding the bladder.  Hemoglobin was stable at 13.6; however, INR was elevated at 5.8.  S/p vitamin K at [facility].   Repeat CT abd/pelvis on admission showed increased size of extraperitoneal hematoma along the anterior aspect of the bladder which was measured 11.6 cm.   -  Received Kcentra, vitamin K and FFP upon admission  -  Hgb remained stable at 11-12 grams.  -  Seen by general surg and rec was non-surg management.  Warfarin reintroduced on 3/9 but has been on hold since 3/13.  -  Pain controlled with PTA tramadol.    Acute blood loss anemia:   Secondary to above.  Hgb  was13 at outside hospital, down to 11-12 grams her but remained stable.  Monitor CBC.    Atrial fibrillation with RVR:   Has a h/o afib, RVR was probably due to missed metoprolol dose and acute abd pain.  PTA Metoprolol dose increased and digoxin added.  HR better this am.  Continue Metoprolol 100 mg BID and digoxin 0.125 mg daily.  Check dig level in am.  INR was reversed as above and reintroduced on 3/9.  INR jumped from 1.75 ---> 2.28 --> coumadin held ---> 2.62 ---> 2.84 ---> 2.63 ---> 2.01 today.  Coumadin has been on hold since 3/13.  LFTs wnl except for elevated total bili.  Elevated INR may be due to poor oral intake.  Coumadin dosing per PharmD.    Elevated Total Bili:  ? Hemolysis.  Check peripheral smear discovered macrocytic anemia (folate or B12  deficiency or previous etoh abuse).  Retic count and LDH are elevated.  Haptoglobin level pending.  Abd US on 3/17/17 discovered coarse increased echogenicity of the liver, which is nonspecific.  Check Folate and B12 level.    Hypothyroidism:   Continue prior to admission Synthroid.     Diet controlled DM-II:   Well controlled w/ diet.  Monitor.      Acute on chronic systolic CHF:   Has a h/o CHF per [facility] record.  Echo on 3/10/17, this admissions showed EF of 50-55% and mod to severe TR.  Rhythm was afib during TTE.  I/O neg 213 since admi.  Wt is up by 2 lbs since admit.  Lasix has been on hold due to worsening renal failure.  Pt has intractable cough, ? Decompensated CHF.  Started lasix 20 mg iv bid on 3/17.        ARF:   Likely pre-renal.  Baseline cr unknown.  Admit cr was 1.2 ---> ---> 1.28 ---> 1.24 ---> 1.21 ---> 1.21 again today.  Reintroduced Lasix 20 mg iv bid on 3/17.  Avoid other Nephrotoxins.      Hypokalemia:   On replacement protocol.    Suspected HCAP/atypical PNA:   Persistent severe wheezing, dyspnea and dry cough.    -  Repeat CXR 3/11 showed coarse Lt lung infiltrates worse than prior.  -  Blood cultures x2 drawn 3/11 and started on Zosyn and azithromycin, Azithromycin d/aiyana on 3/13 because of it's interaction w/ coumadin.    -  Received short course of steroid.  -  Continue to have intractable non-productive cough.  Not on ACE I.  -  CT chest w/o contrast on 3/16 discovered patchy groundglass and interstitial opacities, greater on the left which could be due to pneumonia or edema.  Small right pleural effusion.  Mildly aneurysmal thoracic aorta measuring 4 cm in the ascending aorta and 4.3 cm in the upper descending thoracic aorta.  There is also small rind of soft tissue density adjacent to the upper descending thoracic aorta of uncertain significance  -  Continue acapella valve treatment, schedule Tessalon tid and prn mucomyst neb.  Started Lasix 20 mg iv bid and Claritin 10 mg po daily on  3/17.  He has been on Protonix since admit.    -  Seen by pulm consult seRvice on 3/17.  Solu medrol 40 mg iv q8 hours added.   -  SLP consulted.  On DD2     Obstructive sleep apnea:   Does not use CPAP.     Influenza prophylaxis:  He was initiated on Tamiflu prophylaxis at long-term [facility] facility for 10 days total, started 3/6. Complete course.     Physical deconditioning:   - he is very weak at this time.   - PT eval noted.  Plan is to dc back to [facility]    Deep venous thrombosis prophylaxis:   Warfarin      CODE STATUS:  Full    Dispo:   Anticipate d/c in 1-2 days if cough is better.    Interval History     cough is better today.  He feels tired.  No other complaints.    -Data reviewed today: I reviewed all new labs and imaging over the last 24 hours.     Physical Exam   Heart Rate: 85, Blood pressure 104/57, pulse 65, temperature 98  F (36.7  C), resp. rate 18, height 1.829 m (6'), weight 88.4 kg (194 lb 14.2 oz), SpO2 91 %.  Vitals:    03/15/17 0455 03/16/17 0500 03/17/17 0440   Weight: 92.8 kg (204 lb 9.4 oz) 88.5 kg (195 lb 1.7 oz) 88.4 kg (194 lb 14.2 oz)     Vital Signs with Ranges  Temp:  [97.3  F (36.3  C)-98.1  F (36.7  C)] 98  F (36.7  C)  Pulse:  [] 65  Heart Rate:  [] 85  Resp:  [18-24] 18  BP: (104-141)/(57-92) 104/57  SpO2:  [90 %-95 %] 91 %  I/O's Last 24 hours  I/O last 3 completed shifts:  In: 1630 [P.O.:1530; I.V.:100]  Out: 1225 [Urine:1225]    Constitutional: Alert, awake, and no apparent distress receiving neb now  HEENT: PERRLA, EOMI, mucosa dry now  Respiratory: Coarse b/l but no wheezing now. Not in distress. On room air.  Cardiovascular: irregular, no m/r/g  Abdomen: soft, non-distended, non tenderness, ecchymosis Rt lower abd to scrotum stable  Skin/Integumen:  Ecchymosis  right lower abdomen/scrotum stable, non tender.      Medications   All medications I am responsible for were reviewed.       Warfarin Therapy Reminder         warfarin  1 mg Oral ONCE at 18:00     furosemide   20 mg Intravenous BID     loratadine  10 mg Oral Daily     benzonatate  200 mg Oral TID     methylPREDNISolone  40 mg Intravenous Q8H     sodium chloride (PF)  10 mL Intracatheter Q8H     piperacillin-tazobactam  4.5 g Intravenous Q6H     mirtazapine  15 mg Oral At Bedtime     digoxin  125 mcg Oral Daily     pantoprazole  40 mg Oral QAM     ipratropium - albuterol 0.5 mg/2.5 mg/3 mL  3 mL Nebulization 4x daily     metoprolol  100 mg Oral BID     sodium chloride (PF)  3 mL Intracatheter Q8H     levothyroxine (SYNTHROID/LEVOTHROID) tablet 200 mcg  200 mcg Oral Daily     sennosides  1 tablet Oral At Bedtime        Data     Recent Labs  Lab 03/18/17  0615 03/17/17  0743 03/16/17  0744 03/15/17  0715  03/13/17  1148 03/12/17  0736   WBC  --  8.3  --  7.8  --   --  5.1   HGB  --  12.2*  --  11.0*  --  10.8* 10.9*   MCV  --  103*  --  101*  --   --  101*   PLT  --  266  --  273  --   --  300   INR 2.01* 2.63* 2.84* 2.82*  < > 2.28* 1.75*    138  --  136  --  135 137   POTASSIUM 4.4 4.4  --  4.1  --  3.8 4.1   CHLORIDE 99 101  --  101  --  97 100   CO2 28 27  --  25  --  29 29   BUN 35* 33*  --  40*  --  36* 27   CR 1.21 1.21  --  1.24  --  1.28* 1.19   ANIONGAP 8 10  --  10  --  9 8   SARAH 8.5 8.8  --  8.5  --  8.4* 8.1*   * 97  --  164*  --  186* 218*   ALBUMIN  --  2.7*  --   --   --   --   --    PROTTOTAL  --  6.7*  --   --   --   --   --    BILITOTAL  --  4.7*  --   --   --   --   --    ALKPHOS  --  81  --   --   --   --   --    ALT  --  23  --   --   --   --   --    AST  --  42  --   --   --   --   --    < > = values in this interval not displayed.    Recent Results (from the past 24 hour(s))   US Abdomen Complete    Narrative    ULTRASOUND ABDOMEN COMPLETE  3/17/2017  8:32 PM     HISTORY: Elevated total bilirubin.    COMPARISON: None.    FINDINGS: Liver is coarse and increased in echogenicity without focal  solid lesions. Gallbladder is not well seen due to overlying  shadowing, no definite gallbladder  wall thickening or cholelithiasis  demonstrated. Extrahepatic bile duct is not seen. Pancreas is  completely obscured. Spleen is normal. Kidneys are normal in size.  There is no hydronephrosis. Tiny left renal cyst. Visualized abdominal  aorta and IVC are nonaneurysmal.      Impression    IMPRESSION: Limited exam, coarse increased echogenicity of the liver  which is nonspecific.    GORDON MAS MD

## 2017-03-18 NOTE — PLAN OF CARE
Problem: Goal Outcome Summary  Goal: Goal Outcome Summary  PT-Patient up in w/c. Grumpy (per his report) but agreeable to amb. Daughter present and very supportive and helpful. Patient amb 65 feet with 3 seated rests. Needs CGA for transfers and CGA for amb. Sits quickly when fatigued so helpful to have a chair behind him. Recommend disch back to LTC with PT following to progress him back to baseline. Daughter reports he was amb with ww modified independently prior to admission.

## 2017-03-18 NOTE — PROGRESS NOTES
03/18/17 1600   General Information   Onset Date 03/07/17   Start of Care Date 03/18/17   Referring Physician Ce Patrick MD   Patient Profile Review/OT: Additional Occupational Profile Info See Profile for full history and prior level of function   Patient/Family Goals Statement None stated   Swallowing Evaluation Bedside swallow evaluation   Behaviorial Observations Alert  (Mildly impulsive)   Mode of current nutrition Oral diet   Type of oral diet Regular;Thin liquid   Respiratory Status O2 Supply   Type of O2 supply Nasal cannula   Comments Per MD note: Pt is a 89-year-old male with past medical history of hypothyroidism and diet-controlled type 2 diabetes, obstructive sleep apnea and CHF as well as atrial fibrillation on chronic anticoagulation with warfarin, who presented initially to the [facility] for evaluation of abdominal pain.  He reports 4-5 days of progressive abdominal pain with diminished oral intake.    Clinical Swallow Evaluation   Oral Musculature generally intact   Structural Abnormalities none present   Dentition other (see comments)  (Has partials, not present at hospital)   Mucosal Quality good   Mandibular Strength and Mobility intact   Oral Labial Strength and Mobility WFL   Lingual Strength and Mobility WFL   Velar Elevation (not able to visualize)   Buccal Strength and Mobility intact   Laryngeal Function Cough;Swallow;Voicing initiated;Dry swallow palpated   Oral Musculature Comments WFL   Additional Documentation Yes   Additional evaluation(s) completed today No   Clinical Swallow Eval: Thin Liquid Texture Trial   Mode of Presentation, Thin Liquids cup;self-fed   Volume of Liquid or Food Presented sips x10   Oral Phase of Swallow WFL   Pharyngeal Phase of Swallow feeling of something stuck in throat;reduction in laryngeal movement  (Mildly reduced laryngeal elevation)   Diagnostic Statement Pt noted globus sensation x1. No overt signs of aspiration.   Clinical Swallow Eval:  Puree Solid Texture Trial   Mode of Presentation, Puree spoon;self-fed   Volume of Puree Presented teaspoons x3   Oral Phase, Puree WFL   Pharyngeal Phase, Puree intact   Diagnostic Statement No overt signs of aspiration.   Clinical Swallow Eval: Semisolid Texture Trial   Mode of Presentation, Semisolid spoon;self-fed   Volume of Semisolid Food Presented teaspoon x5   Oral Phase, Semisolid other (see comments)  (Prolonged mastication)   Pharyngeal Phase, Semisolid intact   Diagnostic Statement Suspect prolonged mastication due to lack of partials. No overt signs of aspiration.   Clinical Swallow Eval: Solid Food Texture Trial   Mode of Presentation, Solid fed by clinician   Volume of Solid Food Presented Soft solids x5   Oral Phase, Solid other (see comments)  (Prolonged mastication)   Pharyngeal Phase, Solid intact   Diagnostic Statement Suspect prolonged mastication due to lack of partials. No overt signs of aspiration.   Esophageal Phase of Swallow   Patient reports or presents with symptoms of esophageal dysphagia No   Esophageal sweep performed during today s vidofluoroscopic exam  No   General Therapy Interventions   Planned Therapy Interventions Dysphagia Treatment   Dysphagia treatment Modified diet education  (Swallow precautions)   Swallow Eval: Clinical Impressions   Skilled Criteria for Therapy Intervention Skilled criteria met.  Treatment indicated.   Functional Assessment Scale (FAS) 5   Treatment Diagnosis Mild Oral Pharyngeal Dysphagia   Diet texture recommendations Dysphagia diet level 2;Thin liquids   Recommended Feeding/Eating Techniques alternate between small bites and sips of food/liquid;maintain upright posture during/after eating for 30 mins;small sips/bites   Therapy Frequency daily   Predicted Duration of Therapy Intervention (days/wks) 1 week   Anticipated Discharge Disposition other (see comments)  (TCU)   Risks and Benefits of Treatment have been explained. Yes   Patient, family and/or  staff in agreement with Plan of Care Yes   Clinical Impression Comments SLP - Pt seen for bedside swallow evaluation in pm. Pt presents with mild oral pharyngeal dysphagia. Oral motor exam WFL for pt's age and living situation. Presented PO trials of thin liquids x10, puree x3, semisolid x5, and soft solid x5. Pt reported globus sensation x1 with thin liquids but did not report this for any other textures. Pt's partials are currently at his LTC; suspect lack of partials contributes to prolonged mastication on semisolids and soft solids. Mild reduction in laryngeal elevation exhibited for all textures. Pt reported soft solids felt scratchy on his throat; suspect pharyngeal residue. Pt exhibited coughing x2 not directly linked to swallow during conversation. Pt educated regarding dysphagia diet level 2 and swallow precautions. Plan to ensure diet tolerance and upgrade diet as able. Recommend dysphagia level diet 2 with thin liquids and the following precautions: upright at 90 degrees, small sips/bites, and slow rate. Discharge to TCU when stable with continued SLP swallow tx.   Total Evaluation Time   Total Evaluation Time (Minutes) 25

## 2017-03-18 NOTE — PLAN OF CARE
Problem: Goal Outcome Summary  Goal: Goal Outcome Summary  Outcome: Improving  Vitally stable ex hypertensive 140s/90s at times during agitation. Forgetful and disoriented to place / situation. Appropriate and calm this shift. Pt awake and in wheelchair with Gel pad underneath him most of shift. Ambulated x 50 feet in hallway with assist of 2, gait belt, and walker.   CV: tele Afib CVR. Per notes - will resume coumadin tonight.   Resp: continues to have semi-productive cough. IS performed with encouragement. Stable on room air at this time.   GI: bowel sounds active, tolerating diet.   : voiding small amounts and incontinent at times. IV lasix for diuresis.  Skin: bruising around flank and extending into groin area. Coccyx intact, mepilex x2 applied preventatively. Pt refusing repositioning while in chair, gel pad in use.

## 2017-03-18 NOTE — PLAN OF CARE
Problem: Goal Outcome Summary  Goal: Goal Outcome Summary        SLP - Pt seen for bedside swallow evaluation in pm. Pt presents with mild oral pharyngeal dysphagia. Oral motor exam WFL for pt's age and living situation. Presented PO trials of thin liquids, puree, semisolids, and soft solids. Pt reported globus sensation x1 with thin liquids but did not report this for any other textures. Pt's exhibited prolonged mastication on semisolids and soft solids. Suspect this is due to lack of partials. Pt reported soft solids felt scratchy on his throat; suspect pharyngeal residue. Pt exhibited coughing x2 not directly linked to swallow during conversation; suspect baseline dry cough. Pt educated regarding dysphagia diet level 2 and swallow precautions. Plan to ensure diet tolerance and upgrade diet as able. Recommend dysphagia level diet 2 with thin liquids and the following precautions: upright at 90 degrees, small sips/bites, and slow rate. Discharge to TCU when stable with continued SLP swallow tx.

## 2017-03-19 ENCOUNTER — APPOINTMENT (OUTPATIENT)
Dept: SPEECH THERAPY | Facility: CLINIC | Age: 82
DRG: 813 | End: 2017-03-19
Attending: INTERNAL MEDICINE
Payer: COMMERCIAL

## 2017-03-19 VITALS
SYSTOLIC BLOOD PRESSURE: 113 MMHG | WEIGHT: 198.63 LBS | DIASTOLIC BLOOD PRESSURE: 63 MMHG | BODY MASS INDEX: 26.9 KG/M2 | RESPIRATION RATE: 16 BRPM | OXYGEN SATURATION: 95 % | HEIGHT: 72 IN | TEMPERATURE: 97.5 F | HEART RATE: 89 BPM

## 2017-03-19 LAB
ANION GAP SERPL CALCULATED.3IONS-SCNC: 9 MMOL/L (ref 3–14)
BUN SERPL-MCNC: 38 MG/DL (ref 7–30)
CALCIUM SERPL-MCNC: 8.4 MG/DL (ref 8.5–10.1)
CHLORIDE SERPL-SCNC: 99 MMOL/L (ref 94–109)
CO2 SERPL-SCNC: 28 MMOL/L (ref 20–32)
CREAT SERPL-MCNC: 1.31 MG/DL (ref 0.66–1.25)
GFR SERPL CREATININE-BSD FRML MDRD: 51 ML/MIN/1.7M2
GLUCOSE BLDC GLUCOMTR-MCNC: 216 MG/DL (ref 70–99)
GLUCOSE SERPL-MCNC: 233 MG/DL (ref 70–99)
INR PPP: 1.92 (ref 0.86–1.14)
MAGNESIUM SERPL-MCNC: 2.3 MG/DL (ref 1.6–2.3)
POTASSIUM SERPL-SCNC: 3.8 MMOL/L (ref 3.4–5.3)
SODIUM SERPL-SCNC: 136 MMOL/L (ref 133–144)

## 2017-03-19 PROCEDURE — 25000128 H RX IP 250 OP 636: Performed by: INTERNAL MEDICINE

## 2017-03-19 PROCEDURE — 40000275 ZZH STATISTIC RCP TIME EA 10 MIN

## 2017-03-19 PROCEDURE — 25000132 ZZH RX MED GY IP 250 OP 250 PS 637: Performed by: PHYSICIAN ASSISTANT

## 2017-03-19 PROCEDURE — 00000146 ZZHCL STATISTIC GLUCOSE BY METER IP

## 2017-03-19 PROCEDURE — 36415 COLL VENOUS BLD VENIPUNCTURE: CPT | Performed by: INTERNAL MEDICINE

## 2017-03-19 PROCEDURE — 40000225 ZZH STATISTIC SLP WARD VISIT: Performed by: SPEECH-LANGUAGE PATHOLOGIST

## 2017-03-19 PROCEDURE — 40000239 ZZH STATISTIC VAT ROUNDS

## 2017-03-19 PROCEDURE — 25000132 ZZH RX MED GY IP 250 OP 250 PS 637: Performed by: INTERNAL MEDICINE

## 2017-03-19 PROCEDURE — 25000125 ZZHC RX 250: Performed by: HOSPITALIST

## 2017-03-19 PROCEDURE — 85610 PROTHROMBIN TIME: CPT | Performed by: INTERNAL MEDICINE

## 2017-03-19 PROCEDURE — 25000132 ZZH RX MED GY IP 250 OP 250 PS 637: Performed by: HOSPITALIST

## 2017-03-19 PROCEDURE — 40000257 ZZH STATISTIC CONSULT NO CHARGE VASC ACCESS

## 2017-03-19 PROCEDURE — 83735 ASSAY OF MAGNESIUM: CPT | Performed by: INTERNAL MEDICINE

## 2017-03-19 PROCEDURE — 94640 AIRWAY INHALATION TREATMENT: CPT | Mod: 76

## 2017-03-19 PROCEDURE — 25000128 H RX IP 250 OP 636: Performed by: HOSPITALIST

## 2017-03-19 PROCEDURE — 99239 HOSP IP/OBS DSCHRG MGMT >30: CPT | Performed by: INTERNAL MEDICINE

## 2017-03-19 PROCEDURE — 92526 ORAL FUNCTION THERAPY: CPT | Mod: GN | Performed by: SPEECH-LANGUAGE PATHOLOGIST

## 2017-03-19 PROCEDURE — 94640 AIRWAY INHALATION TREATMENT: CPT

## 2017-03-19 PROCEDURE — 80048 BASIC METABOLIC PNL TOTAL CA: CPT | Performed by: INTERNAL MEDICINE

## 2017-03-19 RX ORDER — PREDNISONE 20 MG/1
40 TABLET ORAL DAILY
Qty: 10 TABLET | Refills: 0 | DISCHARGE
Start: 2017-03-19 | End: 2017-03-24

## 2017-03-19 RX ORDER — BENZONATATE 200 MG/1
200 CAPSULE ORAL 3 TIMES DAILY
Qty: 20 CAPSULE | DISCHARGE
Start: 2017-03-19

## 2017-03-19 RX ORDER — WARFARIN SODIUM 2.5 MG/1
2.5 TABLET ORAL
Status: DISCONTINUED | OUTPATIENT
Start: 2017-03-19 | End: 2017-03-19 | Stop reason: HOSPADM

## 2017-03-19 RX ORDER — TRAMADOL HYDROCHLORIDE 50 MG/1
25 TABLET ORAL EVERY 6 HOURS PRN
Qty: 28 TABLET | Status: SHIPPED | DISCHARGE
Start: 2017-03-19

## 2017-03-19 RX ORDER — MIRTAZAPINE 15 MG/1
15 TABLET, FILM COATED ORAL AT BEDTIME
Qty: 30 TABLET | DISCHARGE
Start: 2017-03-19

## 2017-03-19 RX ORDER — WARFARIN SODIUM 1 MG/1
2 TABLET ORAL SEE ADMIN INSTRUCTIONS
Qty: 30 TABLET | DISCHARGE
Start: 2017-03-19

## 2017-03-19 RX ORDER — METOPROLOL TARTRATE 100 MG
100 TABLET ORAL 2 TIMES DAILY
Qty: 60 TABLET | DISCHARGE
Start: 2017-03-19

## 2017-03-19 RX ORDER — DIGOXIN 125 MCG
125 TABLET ORAL DAILY
Qty: 30 TABLET | DISCHARGE
Start: 2017-03-19

## 2017-03-19 RX ORDER — PANTOPRAZOLE SODIUM 40 MG/1
40 TABLET, DELAYED RELEASE ORAL DAILY
Qty: 30 TABLET | Refills: 1 | DISCHARGE
Start: 2017-03-19

## 2017-03-19 RX ADMIN — PIPERACILLIN AND TAZOBACTAM 4.5 G: 4; .5 INJECTION, POWDER, FOR SOLUTION INTRAVENOUS at 01:43

## 2017-03-19 RX ADMIN — LEVOTHYROXINE SODIUM 200 MCG: 100 TABLET ORAL at 06:07

## 2017-03-19 RX ADMIN — IPRATROPIUM BROMIDE AND ALBUTEROL SULFATE 3 ML: .5; 3 SOLUTION RESPIRATORY (INHALATION) at 11:38

## 2017-03-19 RX ADMIN — PIPERACILLIN AND TAZOBACTAM 4.5 G: 4; .5 INJECTION, POWDER, FOR SOLUTION INTRAVENOUS at 09:13

## 2017-03-19 RX ADMIN — LORATADINE 10 MG: 10 TABLET ORAL at 09:13

## 2017-03-19 RX ADMIN — DIGOXIN 125 MCG: 125 TABLET ORAL at 09:13

## 2017-03-19 RX ADMIN — METHYLPREDNISOLONE SODIUM SUCCINATE 40 MG: 40 INJECTION, POWDER, FOR SOLUTION INTRAMUSCULAR; INTRAVENOUS at 05:19

## 2017-03-19 RX ADMIN — FUROSEMIDE 20 MG: 10 INJECTION, SOLUTION INTRAVENOUS at 09:13

## 2017-03-19 RX ADMIN — ACETAMINOPHEN 650 MG: 325 TABLET, FILM COATED ORAL at 09:13

## 2017-03-19 RX ADMIN — PANTOPRAZOLE SODIUM 40 MG: 40 TABLET, DELAYED RELEASE ORAL at 09:13

## 2017-03-19 RX ADMIN — METOPROLOL TARTRATE 100 MG: 100 TABLET, FILM COATED ORAL at 09:13

## 2017-03-19 RX ADMIN — Medication 12.5 MG: at 09:13

## 2017-03-19 RX ADMIN — BENZONATATE 200 MG: 100 CAPSULE ORAL at 09:13

## 2017-03-19 RX ADMIN — Medication 25 MG: at 05:15

## 2017-03-19 RX ADMIN — IPRATROPIUM BROMIDE AND ALBUTEROL SULFATE 3 ML: .5; 3 SOLUTION RESPIRATORY (INHALATION) at 07:14

## 2017-03-19 RX ADMIN — Medication 12.5 MG: at 04:51

## 2017-03-19 NOTE — PLAN OF CARE
Problem: Goal Outcome Summary  Goal: Goal Outcome Summary        SLP - Pt seen for meal f/u during lunch with son present. Pt observed pacing and taking small sips/bites. Pt exhibited cough x1 and throat clear x1 when attempting to speak with food/liquid in mouth. Pt's son reported that pt will discharge this pm. Provided education to pt and son regarding continuing SLP swallow Tx at LTC/TCU. Plan to ensure diet tolerance and upgrade diet as indicated. Recommend continue dysphagia diet level 2 with thin liquids and the following precautions: upright at 90 degrees, small sips/bites, alternate textures, and wait to speak until food/liquid has been swallowed. Discharge to TCU with continued SLP swallowing Tx to upgrade diet as indicated.

## 2017-03-19 NOTE — DISCHARGE SUMMARY
[facility]    Discharge Summary  Hospitalist    Date of Admission:  3/7/2017  Date of Discharge:  3/19/2017  Discharging Provider: Ce Patrick MD  Date of Service (when I saw the patient): 03/19/17    Discharge Diagnoses   1)  Spontaneous extraperitoneal hemorrhage 2/2 coumadin coagulopathy.  2)   Acute blood loss anemia.  3)   Acute on chronic systolic CHF.  4)   Intractable non-productive cough.    PAST MEDICAL HISTORY:   1. Hypothyroidism.   2. Diet-controlled type 2 diabetes.   3. BPH.   4. Dyslipidemia.   5. Obstructive sleep apnea, does not use CPAP.   6. Depression.   7. GERD.   8. History of colon cancer, status post right colectomy.   9. Hypertension.   10. Spinal stenosis.   11. Atrial fibrillation on chronic anticoagulation.   12. History of iliac artery aneurysm.   13. History of congestive heart failure, unspecified.     PAST SURGICAL HISTORY:   1. Bilateral shoulder arthroplasty.   2. Cholecystectomy.   3. Right hemicolectomy.   4. Hip repair.       History of Present Illness   Mason Gonzalez is a 89 year old male with past medical history of atrial fibrillation on chronic anticoagulation, hypothyroidism and diet-controlled type 2 diabetes who presented from outside hospital on 3/7/17 for evaluation of an extraperitoneal hemorrhage.    Hospital Course   Mason Gonzalez was admitted on 3/7/2017.  The following problems were addressed during his hospitalization:    Spontaneous extraperitoneal hemorrhage 2/2 coumadin coagulopathy:   CT scan at outside hospital showed an 8.4 cm extraperitoneal hemorrhage in the right pelvis surrounding the bladder.  Hemoglobin was stable at 13.6; however, INR was elevated at 5.8.  S/p vitamin K at [facility].  Repeat CT abd/pelvis on admission at [facility] showed increased size of extraperitoneal hematoma along the anterior aspect of the bladder which was measured 11.6 cm.  Treated with Kcentra, vitamin K and FFP upon admission.  Hgb has remained stable  at 11-12 grams since.  Seen by general surg and rec was non-surg management.  Warfarin reintroduced on 3/9 but was held once more from 3/13 to 3/17 due to reaching therapeutic range too quickly but no evidence recurrence of intra-abd bleed.  Pain well controlled with PTA tramadol.     Acute blood loss anemia:  Secondary to above.  Hgb was13 at outside hospital, down to 11-12 grams here, remained stable.   He did not require any transfusion.      Atrial fibrillation with RVR:   Has a h/o afib, RVR was probably due to missed metoprolol dose and acute abd pain.  PTA Metoprolol dose increased and digoxin added.  HR better since.  Will d/c pt to home with Metoprolol 100 mg BID and digoxin 0.125 mg daily. On coumadin for stroke prophylaxis.  INR has been therapeutic past 5 day and is 1.92 at d/c.  Coumadin 1 mg po daily next 2 days and then check INR at SNF.  Adjust coumadin dose for INR goal of 2-3.     Elevated Total Bili:   ? Hemolysis. peripheral smear discovered macrocytic anemia (folate or B12 deficiency or previous etoh abuse). Retic count and LDH are elevated. Haptoglobin level pending.  Abd US on 3/17/17 discovered coarse increased echogenicity of the liver, which is nonspecific. Folate and B12 level pending.  Monitor.     Hypothyroidism:   Continue prior to admission Synthroid.      Diet controlled DM-II:   Well controlled w/ diet.      Acute on chronic systolic CHF:    Has a h/o CHF per [facility] record.  Echo on 3/10/17, this admissions showed EF of 50-55% and mod to severe TR.  Rhythm was afib during TTE.  I/O neg 410 ml since admi.  Wt is up by 6 lbs since admit.  Lasix has been on hold due to worsening renal failure.  Reintroduced Lasix 20 mg iv bid on 3/17. I will d/c him to home w/ PTA Lasix dose.     ARF:   Likely pre-renal. Baseline cr unknown.  Admit cr was 1.2 ---> ---> 1.28 ---> 1.24 ---> 1.21 ---> 1.21 ---> 1.31today.  Reintroduced Lasix on 3/17.  Avoid other Nephrotoxins.      Hypokalemia: On  replacement protocol.     Suspected HCAP/atypical PNA:   Persistent severe wheezing, dyspnea and dry cough.   CXR 3/11 showed coarse Lt lung infiltrates worse than prior.  NGTD from Blood cultures x2 drawn 3/11.  Initiated on Zosyn and azithromycin, Azithromycin d/aiyana on 3/13 because of it's interaction w/ coumadin.  Received steroid, acapella valve treatment, schedule Tessalon tid and prn mucomyst neb, IV Lasix, protonix and Claritin.  Evaluated by SLP and DD2 w/ thin liquid diet.  Continue to have intractable non-productive cough.  Not on ACE I.  CT chest w/o contrast on 3/16 discovered patchy groundglass and interstitial opacities, greater on the left which could be due to pneumonia or edema. Small right pleural effusion.  Mildly aneurysmal thoracic aorta measuring 4 cm in the ascending aorta and 4.3 cm in the upper descending thoracic aorta. There is also small rind of soft tissue density adjacent to the upper descending thoracic aorta of uncertain significance.   Seen by pulm consult service on 3/17 and rec was to place pt back on steroid.  Cough is better past 48 hours.  He completed a 10 days course of Zosyn.  I will d/c him to long term SNF w/ Tessalon, prednisone 40 mg po daily x 5 days and Protonix 40 mg po daily.      Obstructive sleep apnea:   Does not use CPAP.      Influenza prophylaxis:   He was initiated on Tamiflu prophylaxis at long-term care facility for 10 days total, started 3/6.  Completed course.      CODE STATUS:   Full.    Ce Patrick MD.   Hospitalist.  [phone], pager.    Pending Results   These results will be followed up by PCP.  Unresulted Labs Ordered in the Past 30 Days of this Admission     Date and Time Order Name Status Description    3/17/2017 1117 Haptoglobin In process              Primary Care Physician   No primary care provider on file.    Physical Exam   Temp: 97.5  F (36.4  C) Temp src: Oral BP: 113/63 Pulse: 89 Heart Rate: 89 Resp: 16 SpO2: 95 % O2 Device: None (Room  air) Oxygen Delivery: 1 LPM  Vitals:    03/16/17 0500 03/17/17 0440 03/19/17 0618   Weight: 88.5 kg (195 lb 1.7 oz) 88.4 kg (194 lb 14.2 oz) 90.1 kg (198 lb 10.2 oz)     Vital Signs with Ranges  Temp:  [97.5  F (36.4  C)-98  F (36.7  C)] 97.5  F (36.4  C)  Pulse:  [89-94] 89  Heart Rate:  [83-89] 89  Resp:  [15-18] 16  BP: (102-115)/(57-78) 113/63  SpO2:  [87 %-97 %] 95 %  I/O last 3 completed shifts:  In: 553 [P.O.:480; I.V.:73]  Out: 950 [Urine:950]    General: awake, alert and follows command appropriately, NAD.  HEENT:  NC/AT, PERRL, EOMI, neck supple, no thyromegaly, op clear, mmm.  CVS:  irregular, no m/r/g  Lungs:  Coarse b/l but no wheezing now. Not in distress. On room air.   Abd:  Soft, + bs, NT, no rebound or gaurding, no fluid shift.  Ext:  No c/c.  Lymph:  No edema.  Neuro:  Nonfocal.  Musculoskeletal: No calf tenderness to palpation.    Skin:  Ecchymosis right lower abdomen/scrotum stable, non tender.  Psychiatry:  Mood and affect appropriate.    Discharge Disposition   Discharged to long term SNF  Condition at discharge: Stable    Consultations This Hospital Stay   PHYSICAL THERAPY ADULT IP CONSULT  OCCUPATIONAL THERAPY ADULT IP CONSULT  PHARMACY TO DOSE WARFARIN  WOUND OSTOMY CONTINENCE NURSE  IP CONSULT  SPEECH LANGUAGE PATH ADULT IP CONSULT  SOCIAL WORK IP CONSULT  WOUND OSTOMY CONTINENCE NURSE  IP CONSULT  VASCULAR ACCESS ADULT IP CONSULT  PULMONARY IP CONSULT  SPEECH LANGUAGE PATH ADULT IP CONSULT    Time Spent on this Encounter   ICe, personally saw the patient today and spent greater than 30 minutes discharging this patient.    Discharge Orders     General info for SNF   Length of Stay Estimate: Long Term Care  Condition at Discharge: Stable  Level of care:skilled   Rehabilitation Potential: Fair  Admission H&P remains valid and up-to-date: Yes  Recent Chemotherapy: N/A  Use Nursing Home Standing Orders: Yes     Mantoux instructions   Give two-step Mantoux (PPD) Per Facility Policy  Yes     Activity - Up ad mauro     Reason for your hospital stay   Spontaneous extraperitoneal bleed in context of coumadin coagulopathy.     Follow-up and recommended labs and tests    PCP at long term SNF next week.     Full Code     Advance Diet as Tolerated   Follow this diet upon discharge:   Combination Diet Regular Diet Adult; Dysphagia Diet Level 2: Mechan Altered; Thin Liquids (water, ice chips, juice, milk gelatin, ice cream, etc)       Discharge Medications   Current Discharge Medication List      START taking these medications    Details   mirtazapine (REMERON) 15 MG tablet Take 1 tablet (15 mg) by mouth At Bedtime  Qty: 30 tablet    Associated Diagnoses: Depression, unspecified depression type      digoxin (LANOXIN) 125 MCG tablet Take 1 tablet (125 mcg) by mouth daily  Qty: 30 tablet    Associated Diagnoses: Atrial fibrillation, unspecified type (H)      benzonatate (TESSALON) 200 MG capsule Take 1 capsule (200 mg) by mouth 3 times daily  Qty: 20 capsule    Associated Diagnoses: Cough      predniSONE (DELTASONE) 20 MG tablet Take 2 tablets (40 mg) by mouth daily for 5 days  Qty: 10 tablet, Refills: 0    Associated Diagnoses: Cough      pantoprazole (PROTONIX) 40 MG EC tablet Take 1 tablet (40 mg) by mouth daily Take 30-60 minutes before a meal.  Qty: 30 tablet, Refills: 1    Associated Diagnoses: Gastroesophageal reflux disease without esophagitis         CONTINUE these medications which have CHANGED    Details   traMADol (ULTRAM) 50 MG tablet Take 0.5 tablets (25 mg) by mouth every 6 hours as needed  Qty: 28 tablet    Associated Diagnoses: Intraabdominal hemorrhage      warfarin (COUMADIN) 1 MG tablet Take 2 tablets (2 mg) by mouth See Admin Instructions  Qty: 30 tablet    Comments: Take 2 mg po daily x 2 days and then check INR on Tuesday,3/21/17.  INR goal 2-3.  Indications is afib  Associated Diagnoses: Atrial fibrillation, unspecified type (H)      metoprolol (LOPRESSOR) 100 MG tablet Take 1 tablet  "(100 mg) by mouth 2 times daily  Qty: 60 tablet    Associated Diagnoses: Atrial fibrillation, unspecified type (H)         CONTINUE these medications which have NOT CHANGED    Details   Potassium Chloride Mayra CR (K-DUR PO) Take 20 mEq by mouth daily      polyethylene glycol (MIRALAX/GLYCOLAX) Packet Take 1 packet by mouth daily as needed for constipation Give every 4 days if no stool      MECLIZINE HCL PO Take 25 mg by mouth every 6 hours as needed for dizziness      MAGNESIUM OXIDE PO Take 400 mg by mouth At Bedtime      Levothyroxine Sodium (SYNTHROID PO) Take 200 mcg by mouth daily      !! Furosemide (LASIX PO) Take 40 mg by mouth daily      VITAMIN D, CHOLECALCIFEROL, PO Take 4,000 Units by mouth daily      AMOXICILLIN PO Take 2,000 mg by mouth once One hour before dental cleaning      Acetaminophen (TYLENOL PO) Take 650 mg by mouth 4 times daily      prochlorperazine (COMPAZINE) 25 MG Suppository Place 25 mg rectally every 12 hours as needed for nausea      mineral oil-hydrophilic petrolatum (AQUAPHOR) Apply topically 2 times daily as needed for dry skin      Hypromellose (HYDROXYPROPYL METHYLCELLULOSE OP) Place into both eyes 3 times daily      !! FUROSEMIDE PO Take 20 mg by mouth daily (with lunch) At 1400      sennosides (SENOKOT) 8.6 MG tablet Take 1 tablet by mouth At Bedtime       !! - Potential duplicate medications found. Please discuss with provider.      STOP taking these medications       Oseltamivir Phosphate (TAMIFLU PO) Comments:   Reason for Stopping:             Allergies   Allergies   Allergen Reactions     Codeine Other (See Comments)     Since 2007. Gets massive migraine headaches \"like his head is going to explode\"     Lisinopril      Since 2008     "

## 2017-03-19 NOTE — PROGRESS NOTES
SW:  D:  Per Dr Patrick, patient is stable for discharge today.  Patient will return to [facility]  [phone].  Spoke with  to explain patient's d/c.  She will notify their nursing supervisor and requested d/c orders be faxed to [phone].  Writer gave  the phone number of nursing unit if their nurse wishes to speak with our nurse.  Spoke with daughter Luann [phone] regarding d/c.  She was surprised regrding d/c  because when she was here visiting yesterday, nursing thought patient would be here into Monday.  Offered to have Dr Patrick call her with an update however she declined the offer.   We discussed transportation, daughter thought [facility].  Contacted [facility] and the transportation department which would need to approve and arrange transportation is closed today.  Daughter said patient cannot afford to pay for [facility] transport.    PT felt patient could transfer in and out of a car, thus son will transport.  Orders faxed and called care center when patient was leaving.

## 2017-03-19 NOTE — PLAN OF CARE
Problem: Goal Outcome Summary  Goal: Goal Outcome Summary  Outcome: Improving  Pt transferred from . , admitted for peritoneal hemorrhage and increased INR. A&O. CMS intact. Bowel sounds active and present. VSS. Up with assist of 2 and walker, GB. Denies pain. Pt's daughter Luann's number on board, states she is available for any questions, and spent time having dinner with pt. Pt is [place] and was willing to wear hearing aids during this shift. Pt was pleasant when daughter here, but became more irritable when awoken for IV antibiotic and when encouraged to keep arm straight to avoid occlusion. Pt able to use urinal. Pt refuses to reposition, and sits in his wheelchair all day. Pt has abrasions on his rt toes. Tele Afib. Plan d/c to TCU pending.

## 2017-03-19 NOTE — PLAN OF CARE
Problem: Goal Outcome Summary  Goal: Goal Outcome Summary  Outcome: No Change  Pt A&O, forgetful and confused. Irritable after woke up from sleep. [facility]. VSS, 1 LNC. Sats in mid/high 80's on RA. Tele A-fib w CVR. CMS intact. Using urinal. Dressing at coccyx/foot c/d/i. On IV abx. Reinforced education about weight shift and pressure ulcer, patient refusing weight shift and cares. Seroquel and Tramadol x1. D/C to TCU pending.

## 2017-03-19 NOTE — PLAN OF CARE
Problem: Goal Outcome Summary  Goal: Goal Outcome Summary  SLP: Attempted meal f/u. Pt scheduled to eat lunch at 12 pm. Pt and RN agreed to attempting meal f/u at 12 pm. Changed appointment time and will re-attempt meal f/u at 12 pm.

## 2017-03-19 NOTE — PLAN OF CARE
Problem: Goal Outcome Summary  Goal: Goal Outcome Summary  Physical Therapy Discharge Summary     Reason for therapy discharge:    Discharged to Long term care with PT.      Progress towards therapy goal(s). See goals on Care Plan in [facility] electronic health record for goal details.  Goals not met.  Barriers to achieving goals:   limited tolerance for therapy.     Therapy recommendation(s):    Continued therapy is recommended.  Rationale/Recommendations:  Patient would benefit from continued skilled PT. He did not participate initially but by end of stay, pateint was participating. Currently is well below baseline so would benefit from continued skilled PT to progress him back to baseline of amb with ww independently. .

## 2017-03-20 LAB — HAPTOGLOB SERPL-MCNC: <6 MG/DL (ref 35–175)

## 2017-03-20 NOTE — PLAN OF CARE
Problem: Goal Outcome Summary  Goal: Goal Outcome Summary        Speech Language Therapy Discharge Summary     Reason for therapy discharge:    Discharged to LTC facility.     Progress towards therapy goal(s). See goals on Care Plan in [facility] electronic health record for goal details.  Goals not met.  Barriers to achieving goals:   discharge from facility. All SLP swallow goals active.     Therapy recommendation(s):    Continued therapy is recommended.  Rationale/Recommendations:  Advance diet and ensure tolerance with pt wearing partials..

## 2017-07-03 ENCOUNTER — DOCUMENTATION ONLY (OUTPATIENT)
Dept: OTHER | Facility: CLINIC | Age: 82
End: 2017-07-03

## 2017-07-03 PROBLEM — Z71.89 ACP (ADVANCE CARE PLANNING): Chronic | Status: ACTIVE | Noted: 2017-07-03
